# Patient Record
Sex: MALE | Race: WHITE | NOT HISPANIC OR LATINO
[De-identification: names, ages, dates, MRNs, and addresses within clinical notes are randomized per-mention and may not be internally consistent; named-entity substitution may affect disease eponyms.]

---

## 2017-01-25 ENCOUNTER — TRANSCRIPTION ENCOUNTER (OUTPATIENT)
Age: 82
End: 2017-01-25

## 2017-01-30 ENCOUNTER — MEDICATION RENEWAL (OUTPATIENT)
Age: 82
End: 2017-01-30

## 2017-02-06 ENCOUNTER — TRANSCRIPTION ENCOUNTER (OUTPATIENT)
Age: 82
End: 2017-02-06

## 2017-02-10 ENCOUNTER — TRANSCRIPTION ENCOUNTER (OUTPATIENT)
Age: 82
End: 2017-02-10

## 2017-02-13 ENCOUNTER — TRANSCRIPTION ENCOUNTER (OUTPATIENT)
Age: 82
End: 2017-02-13

## 2017-02-28 ENCOUNTER — RX RENEWAL (OUTPATIENT)
Age: 82
End: 2017-02-28

## 2017-03-24 ENCOUNTER — APPOINTMENT (OUTPATIENT)
Dept: HEART AND VASCULAR | Facility: CLINIC | Age: 82
End: 2017-03-24

## 2017-03-24 VITALS
OXYGEN SATURATION: 97 % | TEMPERATURE: 97.6 F | HEART RATE: 66 BPM | WEIGHT: 153 LBS | BODY MASS INDEX: 22.59 KG/M2 | DIASTOLIC BLOOD PRESSURE: 80 MMHG | SYSTOLIC BLOOD PRESSURE: 120 MMHG | RESPIRATION RATE: 12 BRPM

## 2017-03-27 LAB
ALBUMIN SERPL ELPH-MCNC: 4.3 G/DL
ALP BLD-CCNC: 74 U/L
ALT SERPL-CCNC: 23 U/L
ANION GAP SERPL CALC-SCNC: 17 MMOL/L
AST SERPL-CCNC: 28 U/L
BASOPHILS # BLD AUTO: 0.01 K/UL
BASOPHILS NFR BLD AUTO: 0.2 %
BILIRUB SERPL-MCNC: 1 MG/DL
BUN SERPL-MCNC: 19 MG/DL
CALCIUM SERPL-MCNC: 9.5 MG/DL
CHLORIDE SERPL-SCNC: 106 MMOL/L
CHOLEST SERPL-MCNC: 121 MG/DL
CHOLEST/HDLC SERPL: 2.2 RATIO
CO2 SERPL-SCNC: 21 MMOL/L
CREAT SERPL-MCNC: 0.76 MG/DL
EOSINOPHIL # BLD AUTO: 0.13 K/UL
EOSINOPHIL NFR BLD AUTO: 2.1 %
GLUCOSE SERPL-MCNC: 102 MG/DL
HBA1C MFR BLD HPLC: 5.8 %
HCT VFR BLD CALC: 46.3 %
HDLC SERPL-MCNC: 54 MG/DL
HGB BLD-MCNC: 14.8 G/DL
IMM GRANULOCYTES NFR BLD AUTO: 0.2 %
LDLC SERPL CALC-MCNC: 58 MG/DL
LYMPHOCYTES # BLD AUTO: 1.22 K/UL
LYMPHOCYTES NFR BLD AUTO: 20.1 %
MAN DIFF?: NORMAL
MCHC RBC-ENTMCNC: 29.7 PG
MCHC RBC-ENTMCNC: 32 GM/DL
MCV RBC AUTO: 92.8 FL
MONOCYTES # BLD AUTO: 0.55 K/UL
MONOCYTES NFR BLD AUTO: 9.1 %
NEUTROPHILS # BLD AUTO: 4.14 K/UL
NEUTROPHILS NFR BLD AUTO: 68.3 %
PLATELET # BLD AUTO: 208 K/UL
POTASSIUM SERPL-SCNC: 4.4 MMOL/L
PROT SERPL-MCNC: 6.5 G/DL
RBC # BLD: 4.99 M/UL
RBC # FLD: 15.3 %
SODIUM SERPL-SCNC: 144 MMOL/L
TRIGL SERPL-MCNC: 43 MG/DL
WBC # FLD AUTO: 6.06 K/UL

## 2017-04-27 ENCOUNTER — RX RENEWAL (OUTPATIENT)
Age: 82
End: 2017-04-27

## 2017-05-24 ENCOUNTER — APPOINTMENT (OUTPATIENT)
Dept: HEART AND VASCULAR | Facility: CLINIC | Age: 82
End: 2017-05-24

## 2017-05-24 VITALS
OXYGEN SATURATION: 97 % | BODY MASS INDEX: 22.45 KG/M2 | RESPIRATION RATE: 12 BRPM | HEART RATE: 62 BPM | TEMPERATURE: 97.5 F | DIASTOLIC BLOOD PRESSURE: 90 MMHG | SYSTOLIC BLOOD PRESSURE: 150 MMHG | WEIGHT: 152 LBS

## 2017-05-24 DIAGNOSIS — R94.31 ABNORMAL ELECTROCARDIOGRAM [ECG] [EKG]: ICD-10-CM

## 2017-05-24 DIAGNOSIS — R26.81 UNSTEADINESS ON FEET: ICD-10-CM

## 2017-05-25 LAB
25(OH)D3 SERPL-MCNC: 32.7 NG/ML
ALBUMIN SERPL ELPH-MCNC: 4.4 G/DL
ALP BLD-CCNC: 71 U/L
ALT SERPL-CCNC: 19 U/L
ANION GAP SERPL CALC-SCNC: 17 MMOL/L
AST SERPL-CCNC: 26 U/L
BASOPHILS # BLD AUTO: 0.01 K/UL
BASOPHILS NFR BLD AUTO: 0.1 %
BILIRUB SERPL-MCNC: 0.8 MG/DL
BUN SERPL-MCNC: 21 MG/DL
CALCIUM SERPL-MCNC: 9.2 MG/DL
CHLORIDE SERPL-SCNC: 102 MMOL/L
CHOLEST SERPL-MCNC: 139 MG/DL
CHOLEST/HDLC SERPL: 2.6 RATIO
CO2 SERPL-SCNC: 24 MMOL/L
CREAT SERPL-MCNC: 0.65 MG/DL
EOSINOPHIL # BLD AUTO: 0.13 K/UL
EOSINOPHIL NFR BLD AUTO: 1.9 %
GLUCOSE SERPL-MCNC: 98 MG/DL
HBA1C MFR BLD HPLC: 5.8 %
HCT VFR BLD CALC: 46.6 %
HDLC SERPL-MCNC: 54 MG/DL
HGB BLD-MCNC: 14.9 G/DL
IMM GRANULOCYTES NFR BLD AUTO: 0.1 %
LDLC SERPL CALC-MCNC: 74 MG/DL
LYMPHOCYTES # BLD AUTO: 1.75 K/UL
LYMPHOCYTES NFR BLD AUTO: 25.3 %
MAGNESIUM SERPL-MCNC: 2.1 MG/DL
MAN DIFF?: NORMAL
MCHC RBC-ENTMCNC: 29.3 PG
MCHC RBC-ENTMCNC: 32 GM/DL
MCV RBC AUTO: 91.7 FL
MONOCYTES # BLD AUTO: 0.48 K/UL
MONOCYTES NFR BLD AUTO: 6.9 %
NEUTROPHILS # BLD AUTO: 4.53 K/UL
NEUTROPHILS NFR BLD AUTO: 65.7 %
PLATELET # BLD AUTO: 217 K/UL
POTASSIUM SERPL-SCNC: 4.7 MMOL/L
PROT SERPL-MCNC: 6.8 G/DL
RBC # BLD: 5.08 M/UL
RBC # FLD: 14.8 %
SODIUM SERPL-SCNC: 143 MMOL/L
TRIGL SERPL-MCNC: 53 MG/DL
TSH SERPL-ACNC: 3.27 UIU/ML
WBC # FLD AUTO: 6.91 K/UL

## 2017-05-26 LAB
B BURGDOR AB SER-IMP: NEGATIVE
B BURGDOR IGM PATRN SER IB-IMP: NEGATIVE
B BURGDOR18/20KD IGM SER QL IB: NORMAL
B BURGDOR18KD IGG SER QL IB: NORMAL
B BURGDOR23KD IGG SER QL IB: NORMAL
B BURGDOR23KD IGM SER QL IB: NORMAL
B BURGDOR28KD AB SER QL IB: NORMAL
B BURGDOR28KD IGG SER QL IB: NORMAL
B BURGDOR30KD AB SER QL IB: NORMAL
B BURGDOR30KD IGG SER QL IB: NORMAL
B BURGDOR31KD IGG SER QL IB: NORMAL
B BURGDOR31KD IGM SER QL IB: NORMAL
B BURGDOR39KD IGG SER QL IB: NORMAL
B BURGDOR39KD IGM SER QL IB: NORMAL
B BURGDOR41KD IGG SER QL IB: PRESENT
B BURGDOR41KD IGM SER QL IB: NORMAL
B BURGDOR45KD AB SER QL IB: NORMAL
B BURGDOR45KD IGG SER QL IB: NORMAL
B BURGDOR58KD AB SER QL IB: NORMAL
B BURGDOR58KD IGG SER QL IB: PRESENT
B BURGDOR66KD IGG SER QL IB: PRESENT
B BURGDOR66KD IGM SER QL IB: NORMAL
B BURGDOR93KD IGG SER QL IB: NORMAL
B BURGDOR93KD IGM SER QL IB: NORMAL

## 2017-05-30 ENCOUNTER — APPOINTMENT (OUTPATIENT)
Dept: HEART AND VASCULAR | Facility: CLINIC | Age: 82
End: 2017-05-30

## 2017-05-30 VITALS
OXYGEN SATURATION: 97 % | RESPIRATION RATE: 12 BRPM | HEART RATE: 90 BPM | TEMPERATURE: 97.9 F | SYSTOLIC BLOOD PRESSURE: 130 MMHG | DIASTOLIC BLOOD PRESSURE: 88 MMHG

## 2017-05-30 RX ORDER — OFLOXACIN 3 MG/ML
0.3 SOLUTION/ DROPS OPHTHALMIC
Qty: 5 | Refills: 0 | Status: ACTIVE | COMMUNITY
Start: 2017-05-04

## 2017-06-17 ENCOUNTER — APPOINTMENT (OUTPATIENT)
Dept: HEART AND VASCULAR | Facility: CLINIC | Age: 82
End: 2017-06-17

## 2017-06-22 ENCOUNTER — APPOINTMENT (OUTPATIENT)
Dept: HEART AND VASCULAR | Facility: CLINIC | Age: 82
End: 2017-06-22

## 2017-06-22 VITALS
SYSTOLIC BLOOD PRESSURE: 160 MMHG | HEART RATE: 61 BPM | BODY MASS INDEX: 23.19 KG/M2 | HEIGHT: 68 IN | WEIGHT: 153 LBS | DIASTOLIC BLOOD PRESSURE: 78 MMHG

## 2017-07-11 ENCOUNTER — FORM ENCOUNTER (OUTPATIENT)
Age: 82
End: 2017-07-11

## 2017-07-11 ENCOUNTER — OUTPATIENT (OUTPATIENT)
Dept: OUTPATIENT SERVICES | Facility: HOSPITAL | Age: 82
LOS: 1 days | Discharge: HOME | End: 2017-07-11
Payer: MEDICARE

## 2017-07-11 VITALS — WEIGHT: 149.91 LBS | HEIGHT: 69 IN

## 2017-07-11 DIAGNOSIS — E78.5 HYPERLIPIDEMIA, UNSPECIFIED: ICD-10-CM

## 2017-07-11 DIAGNOSIS — I45.2 BIFASCICULAR BLOCK: ICD-10-CM

## 2017-07-11 DIAGNOSIS — I10 ESSENTIAL (PRIMARY) HYPERTENSION: ICD-10-CM

## 2017-07-11 PROCEDURE — 93620 COMP EP EVL R AT VEN PAC&REC: CPT | Mod: 26

## 2017-07-11 RX ORDER — LOSARTAN POTASSIUM 100 MG/1
100 TABLET, FILM COATED ORAL ONCE
Qty: 0 | Refills: 0 | Status: COMPLETED | OUTPATIENT
Start: 2017-07-11 | End: 2017-07-11

## 2017-07-11 RX ORDER — AMLODIPINE BESYLATE 2.5 MG/1
5 TABLET ORAL ONCE
Qty: 0 | Refills: 0 | Status: COMPLETED | OUTPATIENT
Start: 2017-07-11 | End: 2017-07-11

## 2017-07-11 RX ORDER — CEFAZOLIN SODIUM 1 G
1000 VIAL (EA) INJECTION ONCE
Qty: 0 | Refills: 0 | Status: COMPLETED | OUTPATIENT
Start: 2017-07-11 | End: 2017-07-11

## 2017-07-11 RX ORDER — CEFAZOLIN SODIUM 1 G
VIAL (EA) INJECTION
Qty: 0 | Refills: 0 | Status: COMPLETED | OUTPATIENT
Start: 2017-07-11 | End: 2017-07-12

## 2017-07-11 RX ORDER — ATORVASTATIN CALCIUM 80 MG/1
20 TABLET, FILM COATED ORAL ONCE
Qty: 0 | Refills: 0 | Status: COMPLETED | OUTPATIENT
Start: 2017-07-11 | End: 2017-07-11

## 2017-07-11 RX ORDER — ASPIRIN/CALCIUM CARB/MAGNESIUM 324 MG
81 TABLET ORAL ONCE
Qty: 0 | Refills: 0 | Status: COMPLETED | OUTPATIENT
Start: 2017-07-11 | End: 2017-07-11

## 2017-07-11 RX ORDER — LANOLIN ALCOHOL/MO/W.PET/CERES
3 CREAM (GRAM) TOPICAL AT BEDTIME
Qty: 0 | Refills: 0 | Status: DISCONTINUED | OUTPATIENT
Start: 2017-07-11 | End: 2017-07-12

## 2017-07-11 RX ORDER — ACETAMINOPHEN 500 MG
650 TABLET ORAL EVERY 6 HOURS
Qty: 0 | Refills: 0 | Status: DISCONTINUED | OUTPATIENT
Start: 2017-07-11 | End: 2017-07-12

## 2017-07-11 RX ORDER — CEFAZOLIN SODIUM 1 G
1000 VIAL (EA) INJECTION EVERY 8 HOURS
Qty: 0 | Refills: 0 | Status: COMPLETED | OUTPATIENT
Start: 2017-07-11 | End: 2017-07-12

## 2017-07-11 RX ADMIN — Medication 100 MILLIGRAM(S): at 09:42

## 2017-07-11 RX ADMIN — AMLODIPINE BESYLATE 5 MILLIGRAM(S): 2.5 TABLET ORAL at 18:39

## 2017-07-11 RX ADMIN — Medication 81 MILLIGRAM(S): at 18:39

## 2017-07-11 RX ADMIN — ATORVASTATIN CALCIUM 20 MILLIGRAM(S): 80 TABLET, FILM COATED ORAL at 21:59

## 2017-07-11 RX ADMIN — LOSARTAN POTASSIUM 100 MILLIGRAM(S): 100 TABLET, FILM COATED ORAL at 18:39

## 2017-07-11 RX ADMIN — Medication 100 MILLIGRAM(S): at 18:40

## 2017-07-11 NOTE — H&P ADULT - PMH
Essential hypertension    HLD (hyperlipidemia)    RBBB (right bundle branch block with left anterior fascicular block)

## 2017-07-11 NOTE — H&P ADULT - HISTORY OF PRESENT ILLNESS
89 yo m with history of HTN, HLD, RBBB/LAFB presents for EP study and possible pacemaker implant.  Patient has had balance problems for the past 2 years, after slipping episode. He denies chest pain, palpitations, dizziness, syncope.  Patient has an out patient cardiac monitor and it showed RBBB.  PAtient was evaluated in the office and advised to have EPS to evaluate for possible PPM.

## 2017-07-12 ENCOUNTER — TRANSCRIPTION ENCOUNTER (OUTPATIENT)
Age: 82
End: 2017-07-12

## 2017-07-12 VITALS — TEMPERATURE: 98 F

## 2017-07-12 PROCEDURE — C1894: CPT

## 2017-07-12 PROCEDURE — 93623 PRGRMD STIMJ&PACG IV RX NFS: CPT

## 2017-07-12 PROCEDURE — C1892: CPT

## 2017-07-12 PROCEDURE — 71046 X-RAY EXAM CHEST 2 VIEWS: CPT

## 2017-07-12 PROCEDURE — C1730: CPT

## 2017-07-12 PROCEDURE — 71020: CPT | Mod: 26

## 2017-07-12 PROCEDURE — 93620 COMP EP EVL R AT VEN PAC&REC: CPT

## 2017-07-12 PROCEDURE — C1785: CPT

## 2017-07-12 PROCEDURE — C1769: CPT

## 2017-07-12 PROCEDURE — C1898: CPT

## 2017-07-12 RX ADMIN — Medication 100 MILLIGRAM(S): at 01:09

## 2017-07-12 RX ADMIN — Medication 3 MILLIGRAM(S): at 01:18

## 2017-07-12 RX ADMIN — Medication 100 MILLIGRAM(S): at 11:02

## 2017-07-12 NOTE — DISCHARGE NOTE ADULT - CARE PROVIDER_API CALL
Mauro Gunter), Cardiac Electrophysiology; Cardiology; Cardiovascular Disease  [address] [place] [address]  Phone: [phone]  Fax: [phone]

## 2017-07-12 NOTE — DISCHARGE NOTE ADULT - MEDICATION SUMMARY - MEDICATIONS TO TAKE
I will START or STAY ON the medications listed below when I get home from the hospital:    aspirin 81 mg oral tablet  -- 1 tab(s) by mouth once a day  -- Indication: For Prevention    losartan 100 mg oral tablet  -- 1 tab(s) by mouth once a day  -- Indication: For Hypertension    atorvastatin 20 mg oral tablet  -- 1 tab(s) by mouth once a day  -- Indication: For Hyperlipidemia    amLODIPine 5 mg oral tablet  -- 1 tab(s) by mouth once a day  -- Indication: For Hypertension    D3 1000 oral tablet  -- 1 tab(s) by mouth once a day  -- Indication: For Supplement

## 2017-07-12 NOTE — DISCHARGE NOTE ADULT - ADDITIONAL INSTRUCTIONS
1.  Follow up with Dr. Porras on  [address] [place] [address], [address]  [phone] 1.  Follow up with Dr. Porras on 7/26 (wednesday) at 11:20 AM.   [address], [address]  [phone]

## 2017-07-12 NOTE — DISCHARGE NOTE ADULT - HOSPITAL COURSE
89 yo m with history of HTN, HLD, RBBB/LAFB presents for EP study and possible pacemaker implant.Patient has had balance problems for the past 2 years, after slipping episode. He denies chest pain, palpitations, dizziness, syncope.Patient has an out patient cardiac monitor and it showed RBBB.PAtient was evaluated in the office and advised to have EPS to evaluate for possible PPM.    EPS was positive and patient has a successful PPM placement, was monitored overnight on tele. Xray showed correct lead placement, no pneumothorax. Patient was discharged home with follow up appointment.

## 2017-07-12 NOTE — DISCHARGE NOTE ADULT - PLAN OF CARE
You underwent a successful pacemaker placement on 7/11/17. To prevent lead dislodgement, please avoid lifting your left arm above shoulder level or lifting anything more than 10 pounds using the left for one month. If you have fever >101, chills, swelling or discharge from the surgical site, pelase notify Dr. Porras. Prevent syncope You underwent a successful pacemaker placement on 7/11/17. To prevent lead dislodgement, please avoid lifting your left arm above shoulder level or lifting anything more than 10 pounds using the left for one month. If you have fever >101, chills, swelling or discharge from the surgical site, please notify Dr. Porras. You can shower on Friday, please sponge bath until then. Please avoid walking on the treadmill until Monday.

## 2017-07-12 NOTE — DISCHARGE NOTE ADULT - CARE PLAN
Principal Discharge DX:	Bifascicular block  Instructions for follow-up, activity and diet:	You underwent a successful pacemaker placement on 7/11/17. To prevent lead dislodgement, please avoid lifting your left arm above shoulder level or lifting anything more than 10 pounds using the left for one month. If you have fever >101, chills, swelling or discharge from the surgical site, pelase notify Dr. Porras. Principal Discharge DX:	Bifascicular block  Goal:	Prevent syncope  Instructions for follow-up, activity and diet:	You underwent a successful pacemaker placement on 7/11/17. To prevent lead dislodgement, please avoid lifting your left arm above shoulder level or lifting anything more than 10 pounds using the left for one month. If you have fever >101, chills, swelling or discharge from the surgical site, pelase notify Dr. Porras. Principal Discharge DX:	Bifascicular block  Goal:	Prevent syncope  Instructions for follow-up, activity and diet:	You underwent a successful pacemaker placement on 7/11/17. To prevent lead dislodgement, please avoid lifting your left arm above shoulder level or lifting anything more than 10 pounds using the left for one month. If you have fever >101, chills, swelling or discharge from the surgical site, please notify Dr. Porras. You can shower on Friday, please sponge bath until then. Please avoid walking on the treadmill until Monday.

## 2017-07-12 NOTE — DISCHARGE NOTE ADULT - PATIENT PORTAL LINK FT
“You can access the [facility] Patient Portal, offered by Health system, by registering with the following website: [email or web address]”

## 2017-07-20 DIAGNOSIS — I44.0 ATRIOVENTRICULAR BLOCK, FIRST DEGREE: ICD-10-CM

## 2017-07-26 ENCOUNTER — APPOINTMENT (OUTPATIENT)
Dept: HEART AND VASCULAR | Facility: CLINIC | Age: 82
End: 2017-07-26

## 2017-07-26 VITALS
BODY MASS INDEX: 23.04 KG/M2 | HEART RATE: 75 BPM | WEIGHT: 152 LBS | HEIGHT: 68 IN | DIASTOLIC BLOOD PRESSURE: 75 MMHG | SYSTOLIC BLOOD PRESSURE: 148 MMHG

## 2017-08-15 ENCOUNTER — RX RENEWAL (OUTPATIENT)
Age: 82
End: 2017-08-15

## 2017-10-11 ENCOUNTER — APPOINTMENT (OUTPATIENT)
Dept: HEART AND VASCULAR | Facility: CLINIC | Age: 82
End: 2017-10-11
Payer: MEDICARE

## 2017-10-11 VITALS
DIASTOLIC BLOOD PRESSURE: 68 MMHG | HEIGHT: 68 IN | WEIGHT: 155 LBS | HEART RATE: 81 BPM | BODY MASS INDEX: 23.49 KG/M2 | SYSTOLIC BLOOD PRESSURE: 110 MMHG

## 2017-10-11 PROCEDURE — 93280 PM DEVICE PROGR EVAL DUAL: CPT

## 2017-11-10 ENCOUNTER — RX RENEWAL (OUTPATIENT)
Age: 82
End: 2017-11-10

## 2017-11-14 ENCOUNTER — APPOINTMENT (OUTPATIENT)
Dept: HEART AND VASCULAR | Facility: CLINIC | Age: 82
End: 2017-11-14
Payer: MEDICARE

## 2017-11-14 VITALS
TEMPERATURE: 98.1 F | DIASTOLIC BLOOD PRESSURE: 84 MMHG | HEART RATE: 67 BPM | HEIGHT: 68 IN | WEIGHT: 156 LBS | BODY MASS INDEX: 23.64 KG/M2 | OXYGEN SATURATION: 96 % | SYSTOLIC BLOOD PRESSURE: 142 MMHG | RESPIRATION RATE: 12 BRPM

## 2017-11-14 VITALS — SYSTOLIC BLOOD PRESSURE: 138 MMHG | DIASTOLIC BLOOD PRESSURE: 80 MMHG

## 2017-11-14 DIAGNOSIS — R19.00 INTRA-ABDOMINAL AND PELVIC SWELLING, MASS AND LUMP, UNSPECIFIED SITE: ICD-10-CM

## 2017-11-14 PROCEDURE — 99214 OFFICE O/P EST MOD 30 MIN: CPT | Mod: 25

## 2017-11-14 PROCEDURE — 36415 COLL VENOUS BLD VENIPUNCTURE: CPT

## 2017-11-14 PROCEDURE — 93000 ELECTROCARDIOGRAM COMPLETE: CPT

## 2017-11-14 RX ORDER — OXYCODONE AND ACETAMINOPHEN 5; 325 MG/1; MG/1
5-325 TABLET ORAL
Qty: 15 | Refills: 0 | Status: DISCONTINUED | COMMUNITY
Start: 2017-05-30 | End: 2017-11-14

## 2017-11-15 LAB
25(OH)D3 SERPL-MCNC: 34 NG/ML
APTT BLD: 37.1 SEC
BASOPHILS # BLD AUTO: 0.01 K/UL
BASOPHILS NFR BLD AUTO: 0.1 %
EOSINOPHIL # BLD AUTO: 0.24 K/UL
EOSINOPHIL NFR BLD AUTO: 3.1 %
FOLATE SERPL-MCNC: >20 NG/ML
HBA1C MFR BLD HPLC: 5.6 %
HCT VFR BLD CALC: 44 %
HGB BLD-MCNC: 14.3 G/DL
IMM GRANULOCYTES NFR BLD AUTO: 0.1 %
INR PPP: 0.97 RATIO
LYMPHOCYTES # BLD AUTO: 1.89 K/UL
LYMPHOCYTES NFR BLD AUTO: 24.6 %
MAN DIFF?: NORMAL
MCHC RBC-ENTMCNC: 30 PG
MCHC RBC-ENTMCNC: 32.5 GM/DL
MCV RBC AUTO: 92.2 FL
MONOCYTES # BLD AUTO: 0.75 K/UL
MONOCYTES NFR BLD AUTO: 9.8 %
NEUTROPHILS # BLD AUTO: 4.77 K/UL
NEUTROPHILS NFR BLD AUTO: 62.3 %
PLATELET # BLD AUTO: 218 K/UL
PT BLD: 11 SEC
RBC # BLD: 4.77 M/UL
RBC # FLD: 14.8 %
VIT B12 SERPL-MCNC: 102 PG/ML
WBC # FLD AUTO: 7.67 K/UL

## 2017-11-16 LAB
ALBUMIN SERPL ELPH-MCNC: 4.2 G/DL
ALP BLD-CCNC: 73 U/L
ALT SERPL-CCNC: 22 U/L
ANION GAP SERPL CALC-SCNC: 15 MMOL/L
AST SERPL-CCNC: 27 U/L
BILIRUB SERPL-MCNC: 0.5 MG/DL
BUN SERPL-MCNC: 18 MG/DL
CALCIUM SERPL-MCNC: 9.7 MG/DL
CHLORIDE SERPL-SCNC: 106 MMOL/L
CHOLEST SERPL-MCNC: 126 MG/DL
CHOLEST/HDLC SERPL: 2.5 RATIO
CO2 SERPL-SCNC: 24 MMOL/L
CREAT SERPL-MCNC: 0.78 MG/DL
GLUCOSE SERPL-MCNC: 104 MG/DL
HDLC SERPL-MCNC: 51 MG/DL
LDLC SERPL CALC-MCNC: 53 MG/DL
POTASSIUM SERPL-SCNC: 5.1 MMOL/L
PROT SERPL-MCNC: 6.6 G/DL
PSA SERPL-MCNC: 12.14 NG/ML
SODIUM SERPL-SCNC: 145 MMOL/L
TRIGL SERPL-MCNC: 108 MG/DL
TSH SERPL-ACNC: 2.84 UIU/ML

## 2017-11-20 ENCOUNTER — OUTPATIENT (OUTPATIENT)
Dept: OUTPATIENT SERVICES | Facility: HOSPITAL | Age: 82
LOS: 1 days | End: 2017-11-20
Payer: MEDICARE

## 2017-11-20 PROCEDURE — 74176 CT ABD & PELVIS W/O CONTRAST: CPT

## 2017-11-20 PROCEDURE — 76705 ECHO EXAM OF ABDOMEN: CPT

## 2017-11-20 PROCEDURE — 74176 CT ABD & PELVIS W/O CONTRAST: CPT | Mod: 26

## 2017-11-20 PROCEDURE — 76705 ECHO EXAM OF ABDOMEN: CPT | Mod: 26

## 2017-12-07 ENCOUNTER — APPOINTMENT (OUTPATIENT)
Dept: SURGERY | Facility: CLINIC | Age: 82
End: 2017-12-07
Payer: MEDICARE

## 2017-12-07 VITALS
HEIGHT: 66.5 IN | BODY MASS INDEX: 23.74 KG/M2 | OXYGEN SATURATION: 98 % | WEIGHT: 149.5 LBS | SYSTOLIC BLOOD PRESSURE: 160 MMHG | DIASTOLIC BLOOD PRESSURE: 84 MMHG | HEART RATE: 67 BPM | TEMPERATURE: 97.3 F

## 2017-12-07 DIAGNOSIS — Z80.3 FAMILY HISTORY OF MALIGNANT NEOPLASM OF BREAST: ICD-10-CM

## 2017-12-07 DIAGNOSIS — H40.9 UNSPECIFIED GLAUCOMA: ICD-10-CM

## 2017-12-07 DIAGNOSIS — Z87.19 PERSONAL HISTORY OF OTHER DISEASES OF THE DIGESTIVE SYSTEM: ICD-10-CM

## 2017-12-07 DIAGNOSIS — Z87.891 PERSONAL HISTORY OF NICOTINE DEPENDENCE: ICD-10-CM

## 2017-12-07 PROCEDURE — 99204 OFFICE O/P NEW MOD 45 MIN: CPT

## 2018-01-26 ENCOUNTER — RX RENEWAL (OUTPATIENT)
Age: 83
End: 2018-01-26

## 2018-03-14 ENCOUNTER — APPOINTMENT (OUTPATIENT)
Dept: HEART AND VASCULAR | Facility: CLINIC | Age: 83
End: 2018-03-14
Payer: MEDICARE

## 2018-03-14 VITALS
BODY MASS INDEX: 24.62 KG/M2 | HEART RATE: 62 BPM | HEIGHT: 66.5 IN | SYSTOLIC BLOOD PRESSURE: 149 MMHG | WEIGHT: 155 LBS | DIASTOLIC BLOOD PRESSURE: 78 MMHG

## 2018-03-14 PROCEDURE — 93280 PM DEVICE PROGR EVAL DUAL: CPT

## 2018-04-03 ENCOUNTER — APPOINTMENT (OUTPATIENT)
Dept: HEART AND VASCULAR | Facility: CLINIC | Age: 83
End: 2018-04-03
Payer: MEDICARE

## 2018-04-03 VITALS
HEIGHT: 66.5 IN | DIASTOLIC BLOOD PRESSURE: 90 MMHG | OXYGEN SATURATION: 97 % | BODY MASS INDEX: 24.62 KG/M2 | WEIGHT: 155 LBS | RESPIRATION RATE: 12 BRPM | HEART RATE: 73 BPM | SYSTOLIC BLOOD PRESSURE: 126 MMHG | TEMPERATURE: 97.9 F

## 2018-04-03 PROCEDURE — 99214 OFFICE O/P EST MOD 30 MIN: CPT | Mod: 25

## 2018-04-03 PROCEDURE — 36415 COLL VENOUS BLD VENIPUNCTURE: CPT

## 2018-04-04 LAB
ALBUMIN SERPL ELPH-MCNC: 4.4 G/DL
ALP BLD-CCNC: 75 U/L
ALT SERPL-CCNC: 22 U/L
ANION GAP SERPL CALC-SCNC: 13 MMOL/L
AST SERPL-CCNC: 25 U/L
BASOPHILS # BLD AUTO: 0.01 K/UL
BASOPHILS NFR BLD AUTO: 0.1 %
BILIRUB SERPL-MCNC: 0.4 MG/DL
BUN SERPL-MCNC: 29 MG/DL
CALCIUM SERPL-MCNC: 9.5 MG/DL
CHLORIDE SERPL-SCNC: 109 MMOL/L
CO2 SERPL-SCNC: 24 MMOL/L
CREAT SERPL-MCNC: 0.81 MG/DL
EOSINOPHIL # BLD AUTO: 0.21 K/UL
EOSINOPHIL NFR BLD AUTO: 2.7 %
FOLATE SERPL-MCNC: >20 NG/ML
GLUCOSE SERPL-MCNC: 117 MG/DL
HCT VFR BLD CALC: 43.8 %
HCYS SERPL-MCNC: 15.3 UMOL/L
HGB BLD-MCNC: 14.3 G/DL
IMM GRANULOCYTES NFR BLD AUTO: 0.1 %
LYMPHOCYTES # BLD AUTO: 1.8 K/UL
LYMPHOCYTES NFR BLD AUTO: 23.4 %
MAN DIFF?: NORMAL
MCHC RBC-ENTMCNC: 29.7 PG
MCHC RBC-ENTMCNC: 32.6 GM/DL
MCV RBC AUTO: 90.9 FL
MONOCYTES # BLD AUTO: 0.65 K/UL
MONOCYTES NFR BLD AUTO: 8.4 %
NEUTROPHILS # BLD AUTO: 5.02 K/UL
NEUTROPHILS NFR BLD AUTO: 65.3 %
PLATELET # BLD AUTO: 214 K/UL
POTASSIUM SERPL-SCNC: 4.3 MMOL/L
PROT SERPL-MCNC: 6.6 G/DL
PSA SERPL-MCNC: 12.41 NG/ML
RBC # BLD: 4.82 M/UL
RBC # FLD: 14.5 %
SODIUM SERPL-SCNC: 146 MMOL/L
TSH SERPL-ACNC: 3.33 UIU/ML
VIT B12 SERPL-MCNC: 197 PG/ML
WBC # FLD AUTO: 7.7 K/UL

## 2018-04-07 LAB — METHYLMALONATE SERPL-SCNC: 412 NMOL/L

## 2018-04-17 ENCOUNTER — APPOINTMENT (OUTPATIENT)
Dept: HEART AND VASCULAR | Facility: CLINIC | Age: 83
End: 2018-04-17
Payer: MEDICARE

## 2018-04-17 VITALS
DIASTOLIC BLOOD PRESSURE: 72 MMHG | RESPIRATION RATE: 12 BRPM | WEIGHT: 155 LBS | SYSTOLIC BLOOD PRESSURE: 132 MMHG | OXYGEN SATURATION: 96 % | TEMPERATURE: 97.9 F | BODY MASS INDEX: 24.62 KG/M2 | HEIGHT: 66.5 IN | HEART RATE: 66 BPM

## 2018-04-17 PROCEDURE — 99215 OFFICE O/P EST HI 40 MIN: CPT

## 2018-04-24 ENCOUNTER — APPOINTMENT (OUTPATIENT)
Dept: HEART AND VASCULAR | Facility: CLINIC | Age: 83
End: 2018-04-24
Payer: MEDICARE

## 2018-04-24 VITALS
DIASTOLIC BLOOD PRESSURE: 80 MMHG | BODY MASS INDEX: 24.78 KG/M2 | RESPIRATION RATE: 12 BRPM | HEART RATE: 60 BPM | OXYGEN SATURATION: 97 % | SYSTOLIC BLOOD PRESSURE: 130 MMHG | HEIGHT: 66.5 IN | TEMPERATURE: 97.9 F | WEIGHT: 156 LBS

## 2018-04-24 PROCEDURE — 99214 OFFICE O/P EST MOD 30 MIN: CPT | Mod: 25

## 2018-04-24 PROCEDURE — 96372 THER/PROPH/DIAG INJ SC/IM: CPT

## 2018-04-27 ENCOUNTER — RX RENEWAL (OUTPATIENT)
Age: 83
End: 2018-04-27

## 2018-05-01 ENCOUNTER — APPOINTMENT (OUTPATIENT)
Dept: HEART AND VASCULAR | Facility: CLINIC | Age: 83
End: 2018-05-01
Payer: MEDICARE

## 2018-05-01 VITALS
TEMPERATURE: 98.5 F | DIASTOLIC BLOOD PRESSURE: 74 MMHG | HEIGHT: 66.5 IN | RESPIRATION RATE: 12 BRPM | HEART RATE: 69 BPM | BODY MASS INDEX: 23.98 KG/M2 | SYSTOLIC BLOOD PRESSURE: 122 MMHG | WEIGHT: 151 LBS | OXYGEN SATURATION: 96 %

## 2018-05-01 PROCEDURE — 99214 OFFICE O/P EST MOD 30 MIN: CPT

## 2018-05-02 LAB
FOLATE SERPL-MCNC: >20 NG/ML
VIT B12 SERPL-MCNC: 393 PG/ML

## 2018-06-03 ENCOUNTER — MOBILE ON CALL (OUTPATIENT)
Age: 83
End: 2018-06-03

## 2018-06-11 ENCOUNTER — APPOINTMENT (OUTPATIENT)
Dept: HEMATOLOGY ONCOLOGY | Facility: CLINIC | Age: 83
End: 2018-06-11
Payer: MEDICARE

## 2018-06-11 VITALS
TEMPERATURE: 98 F | RESPIRATION RATE: 12 BRPM | DIASTOLIC BLOOD PRESSURE: 85 MMHG | BODY MASS INDEX: 22.07 KG/M2 | SYSTOLIC BLOOD PRESSURE: 159 MMHG | WEIGHT: 149 LBS | HEIGHT: 69 IN | OXYGEN SATURATION: 98 % | HEART RATE: 68 BPM

## 2018-06-11 DIAGNOSIS — E53.8 DEFICIENCY OF OTHER SPECIFIED B GROUP VITAMINS: ICD-10-CM

## 2018-06-11 PROCEDURE — 96372 THER/PROPH/DIAG INJ SC/IM: CPT

## 2018-06-11 PROCEDURE — 99204 OFFICE O/P NEW MOD 45 MIN: CPT | Mod: 25

## 2018-06-11 RX ORDER — CYANOCOBALAMIN 1000 UG/ML
1000 INJECTION INTRAMUSCULAR; SUBCUTANEOUS
Qty: 0 | Refills: 0 | Status: COMPLETED | OUTPATIENT
Start: 2018-06-11

## 2018-06-11 RX ADMIN — CYANOCOBALAMIN 0 MCG/ML: 1000 INJECTION INTRAMUSCULAR; SUBCUTANEOUS at 00:00

## 2018-06-27 ENCOUNTER — APPOINTMENT (OUTPATIENT)
Dept: UROLOGY | Facility: CLINIC | Age: 83
End: 2018-06-27
Payer: MEDICARE

## 2018-06-27 VITALS — TEMPERATURE: 97.7 F | HEART RATE: 91 BPM | DIASTOLIC BLOOD PRESSURE: 73 MMHG | SYSTOLIC BLOOD PRESSURE: 123 MMHG

## 2018-06-27 DIAGNOSIS — R41.3 OTHER AMNESIA: ICD-10-CM

## 2018-06-27 PROCEDURE — 99204 OFFICE O/P NEW MOD 45 MIN: CPT

## 2018-06-29 LAB
PSA SERPL-MCNC: 2.75 NG/ML
TESTOST SERPL-MCNC: <2.5 NG/DL

## 2018-07-18 ENCOUNTER — APPOINTMENT (OUTPATIENT)
Dept: UROLOGY | Facility: CLINIC | Age: 83
End: 2018-07-18
Payer: MEDICARE

## 2018-07-18 VITALS — SYSTOLIC BLOOD PRESSURE: 120 MMHG | TEMPERATURE: 99.2 F | HEART RATE: 59 BPM | DIASTOLIC BLOOD PRESSURE: 71 MMHG

## 2018-07-18 PROBLEM — I45.2 BIFASCICULAR BLOCK: Chronic | Status: ACTIVE | Noted: 2017-07-11

## 2018-07-18 PROBLEM — I10 ESSENTIAL (PRIMARY) HYPERTENSION: Chronic | Status: ACTIVE | Noted: 2017-07-11

## 2018-07-18 PROBLEM — E78.5 HYPERLIPIDEMIA, UNSPECIFIED: Chronic | Status: ACTIVE | Noted: 2017-07-11

## 2018-07-18 PROCEDURE — 99213 OFFICE O/P EST LOW 20 MIN: CPT

## 2018-09-11 ENCOUNTER — APPOINTMENT (OUTPATIENT)
Dept: HEART AND VASCULAR | Facility: CLINIC | Age: 83
End: 2018-09-11
Payer: MEDICARE

## 2018-09-11 VITALS
WEIGHT: 158.01 LBS | RESPIRATION RATE: 12 BRPM | DIASTOLIC BLOOD PRESSURE: 78 MMHG | BODY MASS INDEX: 23.4 KG/M2 | HEART RATE: 73 BPM | HEIGHT: 69 IN | SYSTOLIC BLOOD PRESSURE: 144 MMHG | TEMPERATURE: 97.9 F | OXYGEN SATURATION: 96 %

## 2018-09-11 DIAGNOSIS — Z78.9 OTHER SPECIFIED HEALTH STATUS: ICD-10-CM

## 2018-09-11 DIAGNOSIS — R19.09 OTHER INTRA-ABDOMINAL AND PELVIC SWELLING, MASS AND LUMP: ICD-10-CM

## 2018-09-11 DIAGNOSIS — Z87.39 PERSONAL HISTORY OF OTHER DISEASES OF THE MUSCULOSKELETAL SYSTEM AND CONNECTIVE TISSUE: ICD-10-CM

## 2018-09-11 DIAGNOSIS — Z86.19 PERSONAL HISTORY OF OTHER INFECTIOUS AND PARASITIC DISEASES: ICD-10-CM

## 2018-09-11 DIAGNOSIS — I65.23 OCCLUSION AND STENOSIS OF BILATERAL CAROTID ARTERIES: ICD-10-CM

## 2018-09-11 DIAGNOSIS — I51.7 CARDIOMEGALY: ICD-10-CM

## 2018-09-11 DIAGNOSIS — R73.09 OTHER ABNORMAL GLUCOSE: ICD-10-CM

## 2018-09-11 DIAGNOSIS — Z01.818 ENCOUNTER FOR OTHER PREPROCEDURAL EXAMINATION: ICD-10-CM

## 2018-09-11 DIAGNOSIS — I34.0 NONRHEUMATIC MITRAL (VALVE) INSUFFICIENCY: ICD-10-CM

## 2018-09-11 PROCEDURE — 99214 OFFICE O/P EST MOD 30 MIN: CPT

## 2018-09-11 PROCEDURE — 93000 ELECTROCARDIOGRAM COMPLETE: CPT

## 2018-09-11 PROCEDURE — 93306 TTE W/DOPPLER COMPLETE: CPT

## 2018-09-11 PROCEDURE — 36415 COLL VENOUS BLD VENIPUNCTURE: CPT

## 2018-09-11 PROCEDURE — 93880 EXTRACRANIAL BILAT STUDY: CPT

## 2018-09-11 RX ORDER — ATORVASTATIN CALCIUM 20 MG/1
20 TABLET, FILM COATED ORAL DAILY
Qty: 90 | Refills: 1 | Status: ACTIVE | COMMUNITY
Start: 2018-09-11 | End: 1900-01-01

## 2018-09-11 RX ORDER — ZOSTER VACCINE RECOMBINANT, ADJUVANTED 50 MCG/0.5
50 KIT INTRAMUSCULAR
Qty: 1 | Refills: 0 | Status: DISCONTINUED | COMMUNITY
Start: 2017-12-05 | End: 2018-09-11

## 2018-09-11 RX ORDER — AMLODIPINE BESYLATE 5 MG/1
5 TABLET ORAL DAILY
Qty: 90 | Refills: 1 | Status: ACTIVE | COMMUNITY
Start: 2018-09-11 | End: 1900-01-01

## 2018-09-11 RX ORDER — VALACYCLOVIR 1 G/1
1 TABLET, FILM COATED ORAL EVERY 8 HOURS
Qty: 21 | Refills: 0 | Status: DISCONTINUED | COMMUNITY
Start: 2017-05-30 | End: 2018-09-11

## 2018-09-12 ENCOUNTER — APPOINTMENT (OUTPATIENT)
Dept: HEART AND VASCULAR | Facility: CLINIC | Age: 83
End: 2018-09-12

## 2018-09-12 LAB
ALBUMIN SERPL ELPH-MCNC: 4.2 G/DL
ALP BLD-CCNC: 70 U/L
ALT SERPL-CCNC: 33 U/L
ANION GAP SERPL CALC-SCNC: 11 MMOL/L
AST SERPL-CCNC: 33 U/L
BASOPHILS # BLD AUTO: 0.01 K/UL
BASOPHILS NFR BLD AUTO: 0.1 %
BILIRUB SERPL-MCNC: 0.3 MG/DL
BUN SERPL-MCNC: 25 MG/DL
CALCIUM SERPL-MCNC: 9.6 MG/DL
CHLORIDE SERPL-SCNC: 106 MMOL/L
CO2 SERPL-SCNC: 27 MMOL/L
CREAT SERPL-MCNC: 0.67 MG/DL
EOSINOPHIL # BLD AUTO: 0.16 K/UL
EOSINOPHIL NFR BLD AUTO: 2 %
GLUCOSE SERPL-MCNC: 108 MG/DL
HCT VFR BLD CALC: 41.1 %
HGB BLD-MCNC: 13.2 G/DL
IMM GRANULOCYTES NFR BLD AUTO: 0.1 %
LYMPHOCYTES # BLD AUTO: 1.72 K/UL
LYMPHOCYTES NFR BLD AUTO: 21.8 %
MAN DIFF?: NORMAL
MCHC RBC-ENTMCNC: 29.6 PG
MCHC RBC-ENTMCNC: 32.1 GM/DL
MCV RBC AUTO: 92.2 FL
MONOCYTES # BLD AUTO: 0.82 K/UL
MONOCYTES NFR BLD AUTO: 10.4 %
NEUTROPHILS # BLD AUTO: 5.16 K/UL
NEUTROPHILS NFR BLD AUTO: 65.6 %
PLATELET # BLD AUTO: 248 K/UL
POTASSIUM SERPL-SCNC: 4.9 MMOL/L
PROT SERPL-MCNC: 6.6 G/DL
RBC # BLD: 4.46 M/UL
RBC # FLD: 15 %
SODIUM SERPL-SCNC: 144 MMOL/L
WBC # FLD AUTO: 7.88 K/UL

## 2018-09-20 ENCOUNTER — APPOINTMENT (OUTPATIENT)
Dept: HEART AND VASCULAR | Facility: CLINIC | Age: 83
End: 2018-09-20
Payer: MEDICARE

## 2018-09-20 VITALS
DIASTOLIC BLOOD PRESSURE: 65 MMHG | HEIGHT: 69 IN | HEART RATE: 65 BPM | WEIGHT: 155 LBS | SYSTOLIC BLOOD PRESSURE: 126 MMHG | BODY MASS INDEX: 22.96 KG/M2

## 2018-09-20 PROCEDURE — 93280 PM DEVICE PROGR EVAL DUAL: CPT

## 2018-09-24 ENCOUNTER — APPOINTMENT (OUTPATIENT)
Dept: UROLOGY | Facility: CLINIC | Age: 83
End: 2018-09-24
Payer: MEDICARE

## 2018-09-24 VITALS — SYSTOLIC BLOOD PRESSURE: 146 MMHG | HEART RATE: 80 BPM | DIASTOLIC BLOOD PRESSURE: 83 MMHG | TEMPERATURE: 97.4 F

## 2018-09-24 PROCEDURE — 99213 OFFICE O/P EST LOW 20 MIN: CPT

## 2018-09-27 LAB — PSA SERPL-MCNC: 0.96 NG/ML

## 2019-01-29 ENCOUNTER — APPOINTMENT (OUTPATIENT)
Dept: HEART AND VASCULAR | Facility: CLINIC | Age: 84
End: 2019-01-29
Payer: MEDICARE

## 2019-01-29 VITALS
BODY MASS INDEX: 24.73 KG/M2 | HEART RATE: 74 BPM | WEIGHT: 167.01 LBS | RESPIRATION RATE: 12 BRPM | OXYGEN SATURATION: 96 % | DIASTOLIC BLOOD PRESSURE: 80 MMHG | TEMPERATURE: 97.6 F | HEIGHT: 69 IN | SYSTOLIC BLOOD PRESSURE: 142 MMHG

## 2019-01-29 DIAGNOSIS — F03.90 UNSPECIFIED DEMENTIA W/OUT BEHAVIORAL DISTURBANCE: ICD-10-CM

## 2019-01-29 DIAGNOSIS — I10 ESSENTIAL (PRIMARY) HYPERTENSION: ICD-10-CM

## 2019-01-29 DIAGNOSIS — E78.5 HYPERLIPIDEMIA, UNSPECIFIED: ICD-10-CM

## 2019-01-29 PROCEDURE — 36415 COLL VENOUS BLD VENIPUNCTURE: CPT

## 2019-01-29 PROCEDURE — 93000 ELECTROCARDIOGRAM COMPLETE: CPT

## 2019-01-29 PROCEDURE — 99214 OFFICE O/P EST MOD 30 MIN: CPT

## 2019-01-29 NOTE — PHYSICAL EXAM
[General Appearance - Well Developed] : well developed [Normal Appearance] : normal appearance [Well Groomed] : well groomed [General Appearance - Well Nourished] : well nourished [No Deformities] : no deformities [General Appearance - In No Acute Distress] : no acute distress [Normal Conjunctiva] : the conjunctiva exhibited no abnormalities [] : no respiratory distress [Respiration, Rhythm And Depth] : normal respiratory rhythm and effort [Exaggerated Use Of Accessory Muscles For Inspiration] : no accessory muscle use [Auscultation Breath Sounds / Voice Sounds] : lungs were clear to auscultation bilaterally [Heart Sounds] : normal S1 and S2 [Abdomen Soft] : soft [Abdomen Tenderness] : non-tender [Skin Turgor] : normal skin turgor [Oriented To Time, Place, And Person] : oriented to person, place, and time [Affect] : the affect was normal [Mood] : the mood was normal [No Anxiety] : not feeling anxious [FreeTextEntry1] : no edema

## 2019-01-30 LAB
25(OH)D3 SERPL-MCNC: 22.6 NG/ML
ALBUMIN SERPL ELPH-MCNC: 4 G/DL
ALP BLD-CCNC: 86 U/L
ALT SERPL-CCNC: 30 U/L
ANION GAP SERPL CALC-SCNC: 13 MMOL/L
AST SERPL-CCNC: 28 U/L
BASOPHILS # BLD AUTO: 0.01 K/UL
BASOPHILS NFR BLD AUTO: 0.1 %
BILIRUB SERPL-MCNC: 0.4 MG/DL
BUN SERPL-MCNC: 28 MG/DL
CALCIUM SERPL-MCNC: 9.5 MG/DL
CHLORIDE SERPL-SCNC: 107 MMOL/L
CHOLEST SERPL-MCNC: 138 MG/DL
CHOLEST/HDLC SERPL: 2.6 RATIO
CO2 SERPL-SCNC: 24 MMOL/L
CREAT SERPL-MCNC: 0.73 MG/DL
EOSINOPHIL # BLD AUTO: 0.17 K/UL
EOSINOPHIL NFR BLD AUTO: 2.1 %
FOLATE SERPL-MCNC: >20 NG/ML
GLUCOSE SERPL-MCNC: 120 MG/DL
HBA1C MFR BLD HPLC: 5.6 %
HCT VFR BLD CALC: 41.1 %
HDLC SERPL-MCNC: 53 MG/DL
HGB BLD-MCNC: 13.5 G/DL
IMM GRANULOCYTES NFR BLD AUTO: 0.1 %
LDLC SERPL CALC-MCNC: 53 MG/DL
LYMPHOCYTES # BLD AUTO: 1.45 K/UL
LYMPHOCYTES NFR BLD AUTO: 18 %
MAN DIFF?: NORMAL
MCHC RBC-ENTMCNC: 29.7 PG
MCHC RBC-ENTMCNC: 32.8 GM/DL
MCV RBC AUTO: 90.3 FL
MONOCYTES # BLD AUTO: 0.83 K/UL
MONOCYTES NFR BLD AUTO: 10.3 %
NEUTROPHILS # BLD AUTO: 5.57 K/UL
NEUTROPHILS NFR BLD AUTO: 69.4 %
PLATELET # BLD AUTO: 243 K/UL
POTASSIUM SERPL-SCNC: 5.2 MMOL/L
PROT SERPL-MCNC: 6.4 G/DL
PSA SERPL-MCNC: 0.41 NG/ML
RBC # BLD: 4.55 M/UL
RBC # FLD: 14.8 %
SODIUM SERPL-SCNC: 144 MMOL/L
TRIGL SERPL-MCNC: 158 MG/DL
TSH SERPL-ACNC: 2.94 UIU/ML
VIT B12 SERPL-MCNC: 531 PG/ML
WBC # FLD AUTO: 8.04 K/UL

## 2019-01-30 RX ORDER — GALANTAMINE 8 MG/1
8 TABLET, FILM COATED ORAL
Qty: 30 | Refills: 0 | Status: ACTIVE | COMMUNITY
Start: 2019-01-30

## 2019-02-04 ENCOUNTER — APPOINTMENT (OUTPATIENT)
Dept: UROLOGY | Facility: CLINIC | Age: 84
End: 2019-02-04
Payer: MEDICARE

## 2019-02-04 VITALS — DIASTOLIC BLOOD PRESSURE: 79 MMHG | TEMPERATURE: 98.4 F | SYSTOLIC BLOOD PRESSURE: 125 MMHG | HEART RATE: 80 BPM

## 2019-02-04 DIAGNOSIS — C61 MALIGNANT NEOPLASM OF PROSTATE: ICD-10-CM

## 2019-02-04 PROCEDURE — 99213 OFFICE O/P EST LOW 20 MIN: CPT

## 2019-02-04 NOTE — PHYSICAL EXAM
[General Appearance - In No Acute Distress] : no acute distress [FreeTextEntry1] : Elderly, appears stated age [Abdomen Soft] : soft [Abdomen Tenderness] : non-tender [Costovertebral Angle Tenderness] : no ~M costovertebral angle tenderness [Oriented To Time, Place, And Person] : oriented to person, place, and time [Affect] : the affect was normal [Mood] : the mood was normal [Not Anxious] : not anxious [Normal Station and Gait] : the gait and station were normal for the patient's age [No Focal Deficits] : no focal deficits [No Palpable Adenopathy] : no palpable adenopathy

## 2019-02-04 NOTE — ASSESSMENT
[FreeTextEntry1] : 89yo male with reported history of prostate cancer now on primary ADT\par Unknown PSA at diagnosis, prostate cancer grade or stage or date of diagnosis\par Considering his advanced age and functional status, the primary goal of treatment would be to prevent consequences of metastatic prostate cancer\par PSA now = 0.04 after Lupron given by outside urologist\par Continue observation with intermittent ADT as needed\par F/u 3 months

## 2019-02-04 NOTE — HISTORY OF PRESENT ILLNESS
[FreeTextEntry1] : This is an 88yo male referred by Dr. Carlisle for second opinion regarding prostate cancer. He is a poor historian and his friend does not know much history either, so records are mostly from his chart and discussion with Dr. Carlisle. He was reportedly diagnosed around 4 years ago but PSA, grade and stage are unknown. He was on watchful waiting but PSA has recently risen to 15 from 12 and he was started on Lupron. No imaging is available and he does not recall having any imaging tests. He denies any pain or difficulty voiding. He does have memory problems and balance problems. \par \par 7/18/18 Here for f/u. PSA = 2.75, Testosterone <2.5 in June. Imaging was not approved by insurance. No current complaints.\par \par 9/24/18 Here for f/u. Scheduled for eye surgery tomorrow. He thinks he got a Lupron injection in the last couple of weeks by another urologist but he is not sure. He cannot remember the name of the urologist. \par \par 2/04/19 Here for f/u. States he has received at least 2 Lupron injections by Dr. Larsen. PSA last week = 0.04. Feeling well, no complaints.  [None] : None

## 2019-03-14 ENCOUNTER — APPOINTMENT (OUTPATIENT)
Dept: HEART AND VASCULAR | Facility: CLINIC | Age: 84
End: 2019-03-14
Payer: MEDICARE

## 2019-03-14 ENCOUNTER — TRANSCRIPTION ENCOUNTER (OUTPATIENT)
Age: 84
End: 2019-03-14

## 2019-03-14 VITALS
HEART RATE: 68 BPM | HEIGHT: 69 IN | SYSTOLIC BLOOD PRESSURE: 150 MMHG | WEIGHT: 170 LBS | DIASTOLIC BLOOD PRESSURE: 72 MMHG | BODY MASS INDEX: 25.18 KG/M2

## 2019-03-14 PROCEDURE — 93280 PM DEVICE PROGR EVAL DUAL: CPT

## 2019-03-19 NOTE — PHYSICAL EXAM
[General Appearance - Well Developed] : well developed [Normal Appearance] : normal appearance [Well Groomed] : well groomed [General Appearance - Well Nourished] : well nourished [No Deformities] : no deformities [General Appearance - In No Acute Distress] : no acute distress [Heart Rate And Rhythm] : heart rate and rhythm were normal [Heart Sounds] : normal S1 and S2 [Edema] : no peripheral edema present [Respiration, Rhythm And Depth] : normal respiratory rhythm and effort [Exaggerated Use Of Accessory Muscles For Inspiration] : no accessory muscle use [Left Infraclavicular] : left infraclavicular area [Clean] : clean [Dry] : dry [Well-Healed] : well-healed [] : no ischemic changes [Normal Conjunctiva] : the conjunctiva exhibited no abnormalities [Eyelids - No Xanthelasma] : the eyelids demonstrated no xanthelasmas [Normal Jugular Venous V Waves Present] : normal jugular venous V waves present [Oriented To Time, Place, And Person] : oriented to person, place, and time [Impaired Insight] : insight and judgment were intact [Affect] : the affect was normal [Mood] : the mood was normal [No Anxiety] : not feeling anxious [Palpable Crepitus] : no palpable crepitus [Bleeding] : no active bleeding [Foul Odor] : no foul smell [Purulent Drainage] : no purulent drainage [Serosanguineous Drainage] : no serosanquineous drainage [Serous Drainage] : no serous drainage [Erythema] : not erythematous [Warm] : not warm [Tender] : not tender [Indurated] : not indurated [Fluctuant] : not fluctuant

## 2019-03-19 NOTE — PROCEDURE
[No] : not [NSR] : normal sinus rhythm [Pacemaker] : pacemaker [DDD] : DDD [Voltage: ___ volts] : Voltage was [unfilled] volts [Threshold Testing Performed] : Threshold testing was performed [Lead Imp:  ___ohms] : lead impedance was [unfilled] ohms [Sensing Amplitude ___mv] : sensing amplitude was [unfilled] mv [___V @] : [unfilled] V [___ ms] : [unfilled] ms [None] : none [Asense-Vsense ___ %] : Asense-Vsense [unfilled]% [Asense-Vpace ___ %] : Asense-Vpace [unfilled]% [Apace-Vsense ___ %] : Apace-Vsense [unfilled]% [Apace-Vpace ___ %] : Apace-Vpace [unfilled]% [de-identified] : [employer]  [de-identified] : [employer]  [de-identified] : [id number] [de-identified] : 7/11/17 [de-identified] :  [de-identified] : 8.5 years to [facility]  [de-identified] : no events

## 2019-03-19 NOTE — REVIEW OF SYSTEMS
[see HPI] : see HPI [Negative] : Heme/Lymph [Fever] : no fever [Chills] : no chills [Feeling Fatigued] : not feeling fatigued [Dizziness] : no dizziness [Convulsions] : no convulsions

## 2019-03-19 NOTE — HISTORY OF PRESENT ILLNESS
[Palpitations] : no palpitations [SOB] : no dyspnea [Syncope] : no syncope [Dizziness] : no dizziness [Chest Pain] : no chest pain or discomfort [Shoulder Pain] : no shoulder pain [Pain at Site] : no pain at device site [Erythema at Site] : no erythema at device site [Swelling at Site] : no swelling at device site [FreeTextEntry1] : Mr. 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 Mccloud initially presented after wearing an event monitor with no significant arrhythmias; secondary to conduction system abnormalities. He denied any history of syncope or near syncope. He ultimately underwent an EP study which showed significant conduction system disease and underwent pacemaker implant. He presents today for routine follow up and denies any device related complaints. No SOB, chest pain, syncope, near syncope or palpitations.  \par \par  \par

## 2019-03-19 NOTE — DISCUSSION/SUMMARY
[Pacemaker Function Normal] : normal pacemaker function [FreeTextEntry1] : Mr. 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 Pacemaker interrogation reveals normal function. All measured data is within normal limits.  No events for review.  No device parameter changes or medication changes made today..  He will follow up in 6 months or sooner if needed. He knows to call with any questions or concerns in the interm.  \par \par

## 2019-04-13 ENCOUNTER — INPATIENT (INPATIENT)
Facility: HOSPITAL | Age: 84
LOS: 11 days | Discharge: NURSING, REHAB OR LONG-TERM CARE | DRG: 871 | End: 2019-04-25
Attending: STUDENT IN AN ORGANIZED HEALTH CARE EDUCATION/TRAINING PROGRAM | Admitting: STUDENT IN AN ORGANIZED HEALTH CARE EDUCATION/TRAINING PROGRAM
Payer: MEDICARE

## 2019-04-13 VITALS
WEIGHT: 169.98 LBS | TEMPERATURE: 98 F | SYSTOLIC BLOOD PRESSURE: 107 MMHG | RESPIRATION RATE: 17 BRPM | HEART RATE: 88 BPM | DIASTOLIC BLOOD PRESSURE: 71 MMHG | OXYGEN SATURATION: 95 %

## 2019-04-13 LAB
ALBUMIN SERPL ELPH-MCNC: 3.1 G/DL — LOW (ref 3.3–5)
ALBUMIN SERPL ELPH-MCNC: 4.3 G/DL — SIGNIFICANT CHANGE UP (ref 3.3–5)
ALP SERPL-CCNC: 182 U/L — HIGH (ref 40–120)
ALP SERPL-CCNC: 79 U/L — SIGNIFICANT CHANGE UP (ref 40–120)
ALT FLD-CCNC: 47 U/L — HIGH (ref 10–45)
ALT FLD-CCNC: 60 U/L — HIGH (ref 10–45)
ANION GAP SERPL CALC-SCNC: 11 MMOL/L — SIGNIFICANT CHANGE UP (ref 5–17)
ANION GAP SERPL CALC-SCNC: 15 MMOL/L — SIGNIFICANT CHANGE UP (ref 5–17)
ANISOCYTOSIS BLD QL: SLIGHT — SIGNIFICANT CHANGE UP
APPEARANCE UR: CLEAR — SIGNIFICANT CHANGE UP
APTT BLD: 31.1 SEC — SIGNIFICANT CHANGE UP (ref 27.5–36.3)
AST SERPL-CCNC: 174 U/L — HIGH (ref 10–40)
AST SERPL-CCNC: 238 U/L — HIGH (ref 10–40)
BACTERIA # UR AUTO: PRESENT /HPF
BASE EXCESS BLDV CALC-SCNC: -0.3 MMOL/L — SIGNIFICANT CHANGE UP
BASOPHILS # BLD AUTO: 0 K/UL — SIGNIFICANT CHANGE UP (ref 0–0.2)
BASOPHILS NFR BLD AUTO: 0 % — SIGNIFICANT CHANGE UP (ref 0–2)
BILIRUB SERPL-MCNC: 0.9 MG/DL — SIGNIFICANT CHANGE UP (ref 0.2–1.2)
BILIRUB SERPL-MCNC: 1.6 MG/DL — HIGH (ref 0.2–1.2)
BILIRUB UR-MCNC: NEGATIVE — SIGNIFICANT CHANGE UP
BUN SERPL-MCNC: 29 MG/DL — HIGH (ref 7–23)
BUN SERPL-MCNC: 36 MG/DL — HIGH (ref 7–23)
BURR CELLS BLD QL SMEAR: PRESENT — SIGNIFICANT CHANGE UP
CA-I SERPL-SCNC: 1.16 MMOL/L — SIGNIFICANT CHANGE UP (ref 1.12–1.3)
CALCIUM SERPL-MCNC: 10 MG/DL — SIGNIFICANT CHANGE UP (ref 8.4–10.5)
CALCIUM SERPL-MCNC: 8.5 MG/DL — SIGNIFICANT CHANGE UP (ref 8.4–10.5)
CHLORIDE SERPL-SCNC: 104 MMOL/L — SIGNIFICANT CHANGE UP (ref 96–108)
CHLORIDE SERPL-SCNC: 111 MMOL/L — HIGH (ref 96–108)
CK MB CFR SERPL CALC: 48.8 NG/ML — HIGH (ref 0–6.7)
CK SERPL-CCNC: 6959 U/L — HIGH (ref 30–200)
CK SERPL-CCNC: 7744 U/L — HIGH (ref 30–200)
CO2 SERPL-SCNC: 20 MMOL/L — LOW (ref 22–31)
CO2 SERPL-SCNC: 23 MMOL/L — SIGNIFICANT CHANGE UP (ref 22–31)
COLOR SPEC: YELLOW — SIGNIFICANT CHANGE UP
CREAT SERPL-MCNC: 0.72 MG/DL — SIGNIFICANT CHANGE UP (ref 0.5–1.3)
CREAT SERPL-MCNC: 0.92 MG/DL — SIGNIFICANT CHANGE UP (ref 0.5–1.3)
DIFF PNL FLD: ABNORMAL
EOSINOPHIL # BLD AUTO: 0 K/UL — SIGNIFICANT CHANGE UP (ref 0–0.5)
EOSINOPHIL NFR BLD AUTO: 0 % — SIGNIFICANT CHANGE UP (ref 0–6)
EPI CELLS # UR: SIGNIFICANT CHANGE UP /HPF (ref 0–5)
GAS PNL BLDV: 139 MMOL/L — SIGNIFICANT CHANGE UP (ref 138–146)
GAS PNL BLDV: SIGNIFICANT CHANGE UP
GLUCOSE SERPL-MCNC: 127 MG/DL — HIGH (ref 70–99)
GLUCOSE SERPL-MCNC: 191 MG/DL — HIGH (ref 70–99)
GLUCOSE UR QL: NEGATIVE — SIGNIFICANT CHANGE UP
GRAN CASTS # UR COMP ASSIST: ABNORMAL /LPF
HCO3 BLDV-SCNC: 24 MMOL/L — SIGNIFICANT CHANGE UP (ref 20–27)
HCT VFR BLD CALC: 45.7 % — SIGNIFICANT CHANGE UP (ref 39–50)
HGB BLD-MCNC: 15 G/DL — SIGNIFICANT CHANGE UP (ref 13–17)
INR BLD: 1.35 — HIGH (ref 0.88–1.16)
KETONES UR-MCNC: ABNORMAL MG/DL
LACTATE SERPL-SCNC: 1.8 MMOL/L — SIGNIFICANT CHANGE UP (ref 0.5–2)
LACTATE SERPL-SCNC: 3.2 MMOL/L — HIGH (ref 0.5–2)
LACTATE SERPL-SCNC: 4.9 MMOL/L — CRITICAL HIGH (ref 0.5–2)
LEUKOCYTE ESTERASE UR-ACNC: NEGATIVE — SIGNIFICANT CHANGE UP
LYMPHOCYTES # BLD AUTO: 1.16 K/UL — SIGNIFICANT CHANGE UP (ref 1–3.3)
LYMPHOCYTES # BLD AUTO: 4.4 % — LOW (ref 13–44)
MANUAL SMEAR VERIFICATION: SIGNIFICANT CHANGE UP
MCHC RBC-ENTMCNC: 30.1 PG — SIGNIFICANT CHANGE UP (ref 27–34)
MCHC RBC-ENTMCNC: 32.8 GM/DL — SIGNIFICANT CHANGE UP (ref 32–36)
MCV RBC AUTO: 91.8 FL — SIGNIFICANT CHANGE UP (ref 80–100)
MONOCYTES # BLD AUTO: 0.45 K/UL — SIGNIFICANT CHANGE UP (ref 0–0.9)
MONOCYTES NFR BLD AUTO: 1.7 % — LOW (ref 2–14)
MYELOCYTES NFR BLD: 0.9 % — HIGH (ref 0–0)
NEUTROPHILS # BLD AUTO: 24.51 K/UL — HIGH (ref 1.8–7.4)
NEUTROPHILS NFR BLD AUTO: 90.4 % — HIGH (ref 43–77)
NEUTS BAND # BLD: 2.6 % — SIGNIFICANT CHANGE UP (ref 0–8)
NITRITE UR-MCNC: NEGATIVE — SIGNIFICANT CHANGE UP
OB PNL STL: NEGATIVE — SIGNIFICANT CHANGE UP
OVALOCYTES BLD QL SMEAR: SLIGHT — SIGNIFICANT CHANGE UP
PCO2 BLDV: 36 MMHG — LOW (ref 41–51)
PH BLDV: 7.43 — SIGNIFICANT CHANGE UP (ref 7.32–7.43)
PH UR: 6 — SIGNIFICANT CHANGE UP (ref 5–8)
PLAT MORPH BLD: ABNORMAL
PLATELET # BLD AUTO: 183 K/UL — SIGNIFICANT CHANGE UP (ref 150–400)
PLATELET COUNT - ESTIMATE: NORMAL — SIGNIFICANT CHANGE UP
PO2 BLDV: 22 MMHG — SIGNIFICANT CHANGE UP
POIKILOCYTOSIS BLD QL AUTO: SLIGHT — SIGNIFICANT CHANGE UP
POTASSIUM BLDV-SCNC: 3.9 MMOL/L — SIGNIFICANT CHANGE UP (ref 3.5–4.9)
POTASSIUM SERPL-MCNC: 4 MMOL/L — SIGNIFICANT CHANGE UP (ref 3.5–5.3)
POTASSIUM SERPL-MCNC: 4.8 MMOL/L — SIGNIFICANT CHANGE UP (ref 3.5–5.3)
POTASSIUM SERPL-SCNC: 4 MMOL/L — SIGNIFICANT CHANGE UP (ref 3.5–5.3)
POTASSIUM SERPL-SCNC: 4.8 MMOL/L — SIGNIFICANT CHANGE UP (ref 3.5–5.3)
PROT SERPL-MCNC: 5.9 G/DL — LOW (ref 6–8.3)
PROT SERPL-MCNC: 7.6 G/DL — SIGNIFICANT CHANGE UP (ref 6–8.3)
PROT UR-MCNC: 100 MG/DL
PROTHROM AB SERPL-ACNC: 15.4 SEC — HIGH (ref 10–12.9)
RBC # BLD: 4.98 M/UL — SIGNIFICANT CHANGE UP (ref 4.2–5.8)
RBC # FLD: 14.6 % — HIGH (ref 10.3–14.5)
RBC BLD AUTO: ABNORMAL
RBC CASTS # UR COMP ASSIST: ABNORMAL /HPF
SAO2 % BLDV: 25 % — SIGNIFICANT CHANGE UP
SODIUM SERPL-SCNC: 142 MMOL/L — SIGNIFICANT CHANGE UP (ref 135–145)
SODIUM SERPL-SCNC: 142 MMOL/L — SIGNIFICANT CHANGE UP (ref 135–145)
SP GR SPEC: >=1.03 — SIGNIFICANT CHANGE UP (ref 1–1.03)
TROPONIN T SERPL-MCNC: 0.01 NG/ML — SIGNIFICANT CHANGE UP (ref 0–0.01)
UROBILINOGEN FLD QL: 0.2 E.U./DL — SIGNIFICANT CHANGE UP
WBC # BLD: 26.35 K/UL — HIGH (ref 3.8–10.5)
WBC # FLD AUTO: 26.35 K/UL — HIGH (ref 3.8–10.5)
WBC UR QL: < 5 /HPF — SIGNIFICANT CHANGE UP

## 2019-04-13 PROCEDURE — 71045 X-RAY EXAM CHEST 1 VIEW: CPT | Mod: 26,76

## 2019-04-13 PROCEDURE — 70450 CT HEAD/BRAIN W/O DYE: CPT | Mod: 26

## 2019-04-13 PROCEDURE — 93010 ELECTROCARDIOGRAM REPORT: CPT

## 2019-04-13 PROCEDURE — 99291 CRITICAL CARE FIRST HOUR: CPT

## 2019-04-13 PROCEDURE — 99223 1ST HOSP IP/OBS HIGH 75: CPT | Mod: GC

## 2019-04-13 PROCEDURE — 72149 MRI LUMBAR SPINE W/DYE: CPT | Mod: 26

## 2019-04-13 PROCEDURE — 93280 PM DEVICE PROGR EVAL DUAL: CPT | Mod: 26

## 2019-04-13 PROCEDURE — 72133 CT LUMBAR SPINE W/O & W/DYE: CPT | Mod: 26

## 2019-04-13 RX ORDER — SODIUM CHLORIDE 9 MG/ML
1000 INJECTION INTRAMUSCULAR; INTRAVENOUS; SUBCUTANEOUS
Qty: 0 | Refills: 0 | Status: DISCONTINUED | OUTPATIENT
Start: 2019-04-13 | End: 2019-04-14

## 2019-04-13 RX ORDER — ACETAMINOPHEN 500 MG
1000 TABLET ORAL ONCE
Qty: 0 | Refills: 0 | Status: COMPLETED | OUTPATIENT
Start: 2019-04-13 | End: 2019-04-13

## 2019-04-13 RX ORDER — SODIUM CHLORIDE 9 MG/ML
1000 INJECTION INTRAMUSCULAR; INTRAVENOUS; SUBCUTANEOUS ONCE
Qty: 0 | Refills: 0 | Status: COMPLETED | OUTPATIENT
Start: 2019-04-13 | End: 2019-04-13

## 2019-04-13 RX ORDER — VANCOMYCIN HCL 1 G
1000 VIAL (EA) INTRAVENOUS ONCE
Qty: 0 | Refills: 0 | Status: COMPLETED | OUTPATIENT
Start: 2019-04-13 | End: 2019-04-13

## 2019-04-13 RX ORDER — AZITHROMYCIN 500 MG/1
500 TABLET, FILM COATED ORAL ONCE
Qty: 0 | Refills: 0 | Status: COMPLETED | OUTPATIENT
Start: 2019-04-13 | End: 2019-04-13

## 2019-04-13 RX ORDER — CEFTRIAXONE 500 MG/1
1 INJECTION, POWDER, FOR SOLUTION INTRAMUSCULAR; INTRAVENOUS ONCE
Qty: 0 | Refills: 0 | Status: COMPLETED | OUTPATIENT
Start: 2019-04-13 | End: 2019-04-13

## 2019-04-13 RX ADMIN — SODIUM CHLORIDE 1000 MILLILITER(S): 9 INJECTION INTRAMUSCULAR; INTRAVENOUS; SUBCUTANEOUS at 11:47

## 2019-04-13 RX ADMIN — SODIUM CHLORIDE 125 MILLILITER(S): 9 INJECTION INTRAMUSCULAR; INTRAVENOUS; SUBCUTANEOUS at 11:47

## 2019-04-13 RX ADMIN — AZITHROMYCIN 255 MILLIGRAM(S): 500 TABLET, FILM COATED ORAL at 11:52

## 2019-04-13 RX ADMIN — SODIUM CHLORIDE 1000 MILLILITER(S): 9 INJECTION INTRAMUSCULAR; INTRAVENOUS; SUBCUTANEOUS at 13:16

## 2019-04-13 RX ADMIN — CEFTRIAXONE 100 GRAM(S): 500 INJECTION, POWDER, FOR SOLUTION INTRAMUSCULAR; INTRAVENOUS at 11:46

## 2019-04-13 RX ADMIN — CEFTRIAXONE 1 GRAM(S): 500 INJECTION, POWDER, FOR SOLUTION INTRAMUSCULAR; INTRAVENOUS at 12:53

## 2019-04-13 RX ADMIN — Medication 400 MILLIGRAM(S): at 12:53

## 2019-04-13 RX ADMIN — AZITHROMYCIN 500 MILLIGRAM(S): 500 TABLET, FILM COATED ORAL at 13:16

## 2019-04-13 RX ADMIN — Medication 250 MILLIGRAM(S): at 15:23

## 2019-04-13 RX ADMIN — Medication 1000 MILLIGRAM(S): at 13:16

## 2019-04-13 NOTE — ED PROVIDER NOTE - CROS ED NEURO POS
bilat LE weakness x 1 day- reported UE weakness x 1 day as well which resolved/DIFFICULTY WALKING / IMBALANCE

## 2019-04-13 NOTE — CONSULT NOTE ADULT - SUBJECTIVE AND OBJECTIVE BOX
CRITICAL CARE SERVICE INITIAL CONSULT NOTE    HPI: History obtained from family at bedside. Patient is a 89 yo M, Hx of HTN, PPM for SSS, Glaucoma in both eyes (multiple surgeries at [facility]), pyogenic granuloma of eyelid s/p surgery, HLD, dementia who presents with weakness after a concert, LBP, AMS. Patient is no longer able to answer questions appropriately or follow commands, so li        REVIEW OF SYSTEMS:   unable to obtain as above.    PAST MEDICAL HISTORY:     PAST SURGICAL HISTORY:    FAMILY HISTORY:    SOCIAL HISTORY:  Tobacco use:   EtOH use:  Illicit drug use:    MEDICATIONS:  MEDICATIONS  (STANDING):  acetaminophen  IVPB .. 1000 milliGRAM(s) IV Intermittent once  sodium chloride 0.9%. 1000 milliLiter(s) (125 mL/Hr) IV Continuous <Continuous>    MEDICATIONS  (PRN):      ALLERGIES:  Allergies    No Known Allergies    Intolerances        VITAL SIGNS:  Vital Signs Last 24 Hrs  T(C): 39.2 (13 Apr 2019 11:00), Max: 39.2 (13 Apr 2019 11:00)  T(F): 102.5 (13 Apr 2019 11:00), Max: 102.5 (13 Apr 2019 11:00)  HR: 99 (13 Apr 2019 11:59) (88 - 99)  BP: 138/90 (13 Apr 2019 11:59) (107/71 - 138/90)  BP(mean): --  RR: 17 (13 Apr 2019 11:59) (17 - 17)  SpO2: 95% (13 Apr 2019 11:59) (95% - 95%)      PHYSICAL EXAM:  Constitutional: WDWN resting comfortably in bed; NAD  Head: NC/AT  Eyes: PERRL, EOMI, anicteric sclera  ENT: no nasal discharge; uvula midline, no oropharyngeal erythema or exudates; MMM  Neck: supple; no JVD or thyromegaly  Respiratory: CTA B/L; no W/R/R, no retractions  Cardiac: +S1/S2; RRR; no M/R/G; PMI non-displaced  Gastrointestinal: abdomen soft, NT/ND; no rebound or guarding; +BSx4  Genitourinary: normal external genitalia  Back: spine midline, no bony tenderness or step-offs; no CVAT B/L  Extremities: WWP, no clubbing or cyanosis; no peripheral edema  Musculoskeletal: NROM x4; no joint swelling, tenderness or erythema  Vascular: 2+ radial, femoral, DP/PT pulses B/L  Dermatologic: skin warm, dry and intact; no rashes, wounds, or scars  Lymphatic: no submandibular or cervical LAD  Neurologic: AAOx3; CNII-XII grossly intact; no focal deficits  Psychiatric: affect and characteristics of appearance, verbalizations, behaviors are appropriate    LABS:                        15.0   26.35 )-----------( 183      ( 13 Apr 2019 10:49 )             45.7     04-13    142  |  104  |  36<H>  ----------------------------<  191<H>  4.8   |  23  |  0.92    Ca    10.0      13 Apr 2019 10:49    TPro  7.6  /  Alb  4.3  /  TBili  1.6<H>  /  DBili  0.3<H>  /  AST  174<H>  /  ALT  47<H>  /  AlkPhos  182<H>  04-13    PT/INR - ( 13 Apr 2019 10:49 )   PT: 15.4 sec;   INR: 1.35          PTT - ( 13 Apr 2019 10:49 )  PTT:31.1 sec    CARDIAC MARKERS ( 13 Apr 2019 10:49 )  x     / 0.01 ng/mL / 6959 U/L / x     / 48.8 ng/mL      CAPILLARY BLOOD GLUCOSE              RADIOLOGY & ADDITIONAL TESTS: Reviewed. CRITICAL CARE SERVICE INITIAL CONSULT NOTE    HPI: History obtained from family at bedside. Patient is a 89 yo M, Hx of HTN, PPM for SSS, Glaucoma in both eyes (multiple surgeries at [facility]), pyogenic granuloma of eyelid s/p surgery, HLD, dementia who presents with weakness after a concert, LBP, AMS. Patient is no longer able to answer questions appropriately or follow commands, so brought in by neighbor who called wife. Patient grasping around the room and possibly hallucinating, reacting to unseen stimuli. Patient's wife states this is far from his baseline, and that it was getting worse since last night. Patient's wife denies that the patient had any acute complaints other than the back pain and leg weakness as previously stated        REVIEW OF SYSTEMS:   unable to obtain as above.    PAST MEDICAL HISTORY:  As above    PAST SURGICAL HISTORY: As above    FAMILY HISTORY: No pertinent history in first degree relatives    SOCIAL HISTORY:  Tobacco use: Denies  EtOH use: Denies  Illicit drug use: Denies    MEDICATIONS:  MEDICATIONS  (STANDING):  acetaminophen  IVPB .. 1000 milliGRAM(s) IV Intermittent once  sodium chloride 0.9%. 1000 milliLiter(s) (125 mL/Hr) IV Continuous <Continuous>    MEDICATIONS  (PRN):      ALLERGIES:  Allergies    No Known Allergies    Intolerances        VITAL SIGNS:  Vital Signs Last 24 Hrs  T(C): 39.2 (13 Apr 2019 11:00), Max: 39.2 (13 Apr 2019 11:00)  T(F): 102.5 (13 Apr 2019 11:00), Max: 102.5 (13 Apr 2019 11:00)  HR: 99 (13 Apr 2019 11:59) (88 - 99)  BP: 138/90 (13 Apr 2019 11:59) (107/71 - 138/90)  BP(mean): --  RR: 17 (13 Apr 2019 11:59) (17 - 17)  SpO2: 95% (13 Apr 2019 11:59) (95% - 95%)      PHYSICAL EXAM:  Constitutional: Elderly ill appearing male, reacting to stimuli   Head: NC/AT  Eyes: Multiple surgeries bilateral eyes and eye lids, possible pyogenic exudate on left eyelid. a  ENT: no nasal discharge; uvula midline, no oropharyngeal erythema or exudates; Dry mucous membranes  Neck: supple; no JVD or thyromegaly  Respiratory: CTA B/L; no W/R/R, no retractions, mild wheeze at end expiration  Cardiac: +S1/S2; Tachycaridc ; no M/R/G; PMI non-displaced  Gastrointestinal: abdomen soft, NT/ND; no rebound or guarding; +BSx4  Genitourinary: Urinated on self, lost control of bladder which wife says is abnormal, normal external genitalia, rectal exam with positive tone appropriate for age  Back: spine midline, tender to palpation in lower back but unable to localize, possible cva tenderness vs point tenderness  Extremities: WWP, no clubbing or cyanosis; no peripheral edema  Musculoskeletal: unable to move b/l le extremities but can wiggle toes bilaterally and flex ankles   Vascular: 2+ radial, femoral, DP/PT pulses B/L  Dermatologic: skin warm, dry and intact; no rashes, wounds, or scars  Lymphatic: no submandibular or cervical LAD  Neurologic: AAOx0, responding to unseen stimuli      LABS:                        15.0   26.35 )-----------( 183      ( 13 Apr 2019 10:49 )             45.7     04-13    142  |  104  |  36<H>  ----------------------------<  191<H>  4.8   |  23  |  0.92    Ca    10.0      13 Apr 2019 10:49    TPro  7.6  /  Alb  4.3  /  TBili  1.6<H>  /  DBili  0.3<H>  /  AST  174<H>  /  ALT  47<H>  /  AlkPhos  182<H>  04-13    PT/INR - ( 13 Apr 2019 10:49 )   PT: 15.4 sec;   INR: 1.35          PTT - ( 13 Apr 2019 10:49 )  PTT:31.1 sec    CARDIAC MARKERS ( 13 Apr 2019 10:49 )  x     / 0.01 ng/mL / 6959 U/L / x     / 48.8 ng/mL      CAPILLARY BLOOD GLUCOSE              RADIOLOGY & ADDITIONAL TESTS: Reviewed.

## 2019-04-13 NOTE — ED ADULT NURSE NOTE - OBJECTIVE STATEMENT
89 y/o M a&ox2 to self/ time BIBA c/o generalized weakness since 1300 yesterday. DNR/DNI. c/o of lower back pain, unable to ambulate as of yesterday. Unbale to life legs bilaterally. Pt able to move R and L toes. Incontinence x1 w. Denies falls. no arm pronator drift, no slurred speech, no facail asymmetry,. Pt denies fevers, chills, SOB, chest pain, n/v/d. Hx of dementia & Pacemaker.

## 2019-04-13 NOTE — ED PROVIDER NOTE - PSYCHIATRIC, MLM
General: Alert and oriented to person, place, time/situation. normal mood and affect. no apparent risk to self or others.

## 2019-04-13 NOTE — ED ADULT TRIAGE NOTE - OTHER COMPLAINTS
generalized weakness. no headache, no cp, no sob, no fever nor chills. no facial droop, no slurring of speech, strength equal. hx HTN, dementia, prostate CA, pacemaker

## 2019-04-13 NOTE — CONSULT NOTE ADULT - SUBJECTIVE AND OBJECTIVE BOX
Orthopaedic Consult Note    Consult Requested by: ER  Surgeon: Yony    CC:Patient is a 89y old  Male who presents with a chief complaint of AMS, B/L LE weakness (13 Apr 2019 12:34)      HPI  · HPI Objective Statement: 89 yo male DNR/DNI hx of HTN- hx of PPM for SSS -bibems with hx of " weakness" since 1 pm yesterday - c/o of low back pain  no falls or trauma   no fevers or chills  was at a concert from 11-1 pm- afterwards had diff walking per neighbor who escorted him there--also low back pain  as well --also some upper extremity weakness  no confusion no aphasia  no facial droop yesterday  no prior hx of TIA/ CVA  has chronic rectal and urinary incont  ? cough slight sob in ED  no abd pain or diarrhea   no report of fevers or chills	        PAST MEDICAL & SURGICAL HISTORY:  RBBB (right bundle branch block with left anterior fascicular block)  HLD (hyperlipidemia)  Essential hypertension    Allergies    No Known Allergies    Intolerances      MEDICATIONS  (STANDING):  sodium chloride 0.9%. 1000 milliLiter(s) (125 mL/Hr) IV Continuous <Continuous>      Vital Signs Last 24 Hrs  T(C): 37.1 (13 Apr 2019 13:46), Max: 39.2 (13 Apr 2019 11:00)  T(F): 98.7 (13 Apr 2019 13:46), Max: 102.5 (13 Apr 2019 11:00)  HR: 96 (13 Apr 2019 17:23) (88 - 103)  BP: 115/72 (13 Apr 2019 17:23) (107/71 - 138/90)  BP(mean): --  RR: 18 (13 Apr 2019 17:23) (17 - 20)  SpO2: 99% (13 Apr 2019 17:23) (95% - 99%)    Physical Exam:  General: NAD, alert & oriented x 3  TTP over spine midline over thoracolumbar junction  BL LE    Motor: 5/5 GS/TA/EHL/FHL    RLE: 2/5 hip flexors, knee flexion, 4/5 knee e xtension  LLE: 2/5 hip flexors, knee flexion, 4/5 knee extension  Sensation: SILT s/sp/dp/tib  Pulses:  DP/PT 2+ , toes/foot WWP  Rectal tone normal    BL UE    Motor: 5/5 wrist flexion, extension/, palmar intrinsics/bicep/tricep/delt  Sensation: SILT med/uln/rad/musc/axillary   Pulses:  rad/brach 2+ , fingers/hand WWP                              15.0   26.35 )-----------( 183      ( 13 Apr 2019 10:49 )             45.7     04-13    142  |  104  |  36<H>  ----------------------------<  191<H>  4.8   |  23  |  0.92    Ca    10.0      13 Apr 2019 10:49    TPro  7.6  /  Alb  4.3  /  TBili  1.6<H>  /  DBili  0.3<H>  /  AST  174<H>  /  ALT  47<H>  /  AlkPhos  182<H>  04-13      Imaging: CT showing possible early osteomyelitis/disciits T12/L2    A/P: 89yMale w/ possible disciitis/osteomyelitis   -need MRI to r/o epidural abscess  -if only disciitis/osteo treatment is IR biopsy and IV antibiotics   -will continue to follow  Discussed with chief/attending  Ortho Pager: [phone] Orthopaedic Consult Note    Consult Requested by: ER  Surgeon: Yony    CC:Patient is a 89y old  Male who presents with a chief complaint of AMS, B/L LE weakness (13 Apr 2019 12:34)      HPI  · HPI Objective Statement: 89 yo male DNR/DNI hx of HTN- hx of PPM for SSS -bibems with hx of " weakness" since 1 pm yesterday - c/o of low back pain  no falls or trauma   no fevers or chills  was at a concert from 11-1 pm- afterwards had diff walking per neighbor who escorted him there--also low back pain  as well --also some upper extremity weakness  no confusion no aphasia  no facial droop yesterday  no prior hx of TIA/ CVA  has chronic rectal and urinary incont  ? cough slight sob in ED  no abd pain or diarrhea   no report of fevers or chills	        PAST MEDICAL & SURGICAL HISTORY:  RBBB (right bundle branch block with left anterior fascicular block)  HLD (hyperlipidemia)  Essential hypertension    Allergies    No Known Allergies    Intolerances      MEDICATIONS  (STANDING):  sodium chloride 0.9%. 1000 milliLiter(s) (125 mL/Hr) IV Continuous <Continuous>      Vital Signs Last 24 Hrs  T(C): 37.1 (13 Apr 2019 13:46), Max: 39.2 (13 Apr 2019 11:00)  T(F): 98.7 (13 Apr 2019 13:46), Max: 102.5 (13 Apr 2019 11:00)  HR: 96 (13 Apr 2019 17:23) (88 - 103)  BP: 115/72 (13 Apr 2019 17:23) (107/71 - 138/90)  BP(mean): --  RR: 18 (13 Apr 2019 17:23) (17 - 20)  SpO2: 99% (13 Apr 2019 17:23) (95% - 99%)    Physical Exam:  General: NAD, alert & oriented x 3  TTP over spine midline over thoracolumbar junction  BL LE    Motor: 5/5 GS/TA/EHL/FHL    RLE: 2/5 hip flexors, knee flexion, 4/5 knee e xtension  LLE: 2/5 hip flexors, knee flexion, 4/5 knee extension  Sensation: SILT s/sp/dp/tib  Pulses:  DP/PT 2+ , toes/foot WWP  Rectal tone normal    BL UE    Motor: 5/5 wrist flexion, extension/, palmar intrinsics/bicep/tricep/delt  Sensation: SILT med/uln/rad/musc/axillary   Pulses:  rad/brach 2+ , fingers/hand WWP                              15.0   26.35 )-----------( 183      ( 13 Apr 2019 10:49 )             45.7     04-13    142  |  104  |  36<H>  ----------------------------<  191<H>  4.8   |  23  |  0.92    Ca    10.0      13 Apr 2019 10:49    TPro  7.6  /  Alb  4.3  /  TBili  1.6<H>  /  DBili  0.3<H>  /  AST  174<H>  /  ALT  47<H>  /  AlkPhos  182<H>  04-13      Imaging: CT showing possible early osteomyelitis/disciits T12/L2. MRI showing T12-L1 disc edema without endplate erosion     A/P: 89yMale w/ proximal muscle weakness BL LEs, back pain  -no epidural abscess on MRI  -unlikely disciitis given lack of end plate erosion  -if cannot rule out other source of infection, would get IR biopsy of disc   -no surgical intervention at this time  -will continue to follow  Discussed with chief/attending  Ortho Pager: [phone] Orthopaedic Consult Note    Consult Requested by: ER  Surgeon: Yony    CC:Patient is a 89y old  Male who presents with a chief complaint of AMS, B/L LE weakness (13 Apr 2019 12:34)      HPI  · HPI Objective Statement: 89 yo male DNR/DNI hx of HTN- hx of PPM for SSS -bibems with hx of " weakness" since 1 pm yesterday - c/o of low back pain  no falls or trauma   no fevers or chills  was at a concert from 11-1 pm- afterwards had diff walking per neighbor who escorted him there--also low back pain  as well --also some upper extremity weakness  no confusion no aphasia  no facial droop yesterday  no prior hx of TIA/ CVA  has chronic rectal and urinary incont  ? cough slight sob in ED  no abd pain or diarrhea   no report of fevers or chills	        PAST MEDICAL & SURGICAL HISTORY:  RBBB (right bundle branch block with left anterior fascicular block)  HLD (hyperlipidemia)  Essential hypertension    Allergies    No Known Allergies    Intolerances      MEDICATIONS  (STANDING):  sodium chloride 0.9%. 1000 milliLiter(s) (125 mL/Hr) IV Continuous <Continuous>      Vital Signs Last 24 Hrs  T(C): 37.1 (13 Apr 2019 13:46), Max: 39.2 (13 Apr 2019 11:00)  T(F): 98.7 (13 Apr 2019 13:46), Max: 102.5 (13 Apr 2019 11:00)  HR: 96 (13 Apr 2019 17:23) (88 - 103)  BP: 115/72 (13 Apr 2019 17:23) (107/71 - 138/90)  BP(mean): --  RR: 18 (13 Apr 2019 17:23) (17 - 20)  SpO2: 99% (13 Apr 2019 17:23) (95% - 99%)    Physical Exam:  General: NAD, alert & oriented x 3  TTP over spine midline over thoracolumbar junction  BL LE    Motor: 5/5 GS/TA/EHL/FHL    RLE: 2/5 hip flexors, knee flexion, 4/5 knee e xtension  LLE: 2/5 hip flexors, knee flexion, 4/5 knee extension  Sensation: SILT s/sp/dp/tib  Pulses:  DP/PT 2+ , toes/foot WWP  Rectal tone normal    BL UE    Motor: 5/5 wrist flexion, extension/, palmar intrinsics/bicep/tricep/delt  Sensation: SILT med/uln/rad/musc/axillary   Pulses:  rad/brach 2+ , fingers/hand WWP                              15.0   26.35 )-----------( 183      ( 13 Apr 2019 10:49 )             45.7     04-13    142  |  104  |  36<H>  ----------------------------<  191<H>  4.8   |  23  |  0.92    Ca    10.0      13 Apr 2019 10:49    TPro  7.6  /  Alb  4.3  /  TBili  1.6<H>  /  DBili  0.3<H>  /  AST  174<H>  /  ALT  47<H>  /  AlkPhos  182<H>  04-13      Imaging: CT showing possible early osteomyelitis/disciits T12/L2. MRI showing T12-L1 disc edema without endplate erosion     A/P: 89yMale w/ proximal muscle weakness BL LEs, back pain  -no epidural abscess on MRI  -unlikely disciitis given lack of end plate erosion  -if cannot rule out other source of infection, would get IR biopsy of disc   -consider neurology consult for weakness  -no surgical intervention at this time  -will continue to follow  Discussed with chief/attending  Ortho Pager: [phone]

## 2019-04-13 NOTE — PROCEDURE NOTE - ADDITIONAL PROCEDURE DETAILS
Called to assess if PPM was MRI compatible. Patient went for MRI of spine. Changed to MRI mode (ODO). Patient tolerated procedure well. When patient returned to ED, switched back out of MRI mode and back to DDD. Dr. Dominguez aware. Discussed with Dr. Gunter.

## 2019-04-13 NOTE — CONSULT NOTE ADULT - ASSESSMENT
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 ?Rhabdomyolysis from initial concert sitting which then compounded overnight as patient was asleep. AMS concerning for neurologic picture however CT head negative. 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 ?Rhabdomyolysis from initial concert sitting which then compounded overnight as patient was asleep. AMS concerning for neurologic picture however CT head negative. Given LE weakness and back pain, possible osteo or spinal abscess is concerning  - Treat rhabdomyolysis with IVF, trend CK to clearance  - MRI spine unable to be performed 2/2 pacemaker, will opt for CT with IV contrast due to high clinical suspicion for abscess  - Will monitor and follow up     ADDENDUM 1524pm  Spoke with ED attending, CT showing discitis cannot rule out abscess, due to high clinical suspicion agree with pursuing possible surgical intervention as patient with good quality of life prior to recent change.   Patient seen and examined at bedside with critical care attending, patient improved in terms of respiratory status and mentation, answering questions approrpiately, still unable to lift legs with focal pain in back concerning for spinal process

## 2019-04-13 NOTE — ED PROVIDER NOTE - DIAGNOSTIC INTERPRETATION
CR-- 1 view- noted bibasilar infiltates/  cardiomegaly/  nl bones and soft tissues- nl cardiac silhouette

## 2019-04-13 NOTE — ED ADULT NURSE REASSESSMENT NOTE - NS ED NURSE REASSESS COMMENT FT1
Pt appears SOB. Placed on 5 L NC Placed on CCM, Pacemaker spikes, HR in the 120s High flow nasal canula paged

## 2019-04-13 NOTE — ED ADULT NURSE NOTE - NSIMPLEMENTINTERV_GEN_ALL_ED
Implemented All Fall with Harm Risk Interventions:  [facility] to call system. Call bell, personal items and telephone within reach. Instruct patient to call for assistance. Room bathroom lighting operational. Non-slip footwear when patient is off stretcher. Physically safe environment: no spills, clutter or unnecessary equipment. Stretcher in lowest position, wheels locked, appropriate side rails in place. Provide visual cue, wrist band, yellow gown, etc. Monitor gait and stability. Monitor for mental status changes and reorient to person, place, and time. Review medications for side effects contributing to fall risk. Reinforce activity limits and safety measures with patient and family. Provide visual clues: red socks.

## 2019-04-13 NOTE — ED PROVIDER NOTE - PROGRESS NOTE DETAILS
rectal temp 102.5-  no decubs noted-- mild LS spine tenderness- CXR ? discoid atalectasis vs infilt--LS spine CT - hardware makes it diff to read per RAD-  no obv discitis ? osteo-  no air or signs of abscess spine - ortho - to eval pt spine - ortho - to eval pt  T12 L1 ? early disciitis --  no def abscess-- cards to eval pt for MRI in light of PPM   await MICU input

## 2019-04-13 NOTE — ED PROVIDER NOTE - SHIFT CHANGE DETAILS
await MRI results  - if pos for epidural abscess - oprtho to admit to 8E/8L - if negative possible 7L admit - await ICU re-eval

## 2019-04-13 NOTE — ED PROVIDER NOTE - CARE PLAN
Principal Discharge DX:	Fever  Secondary Diagnosis:	Pneumonia  Secondary Diagnosis:	Rhabdomyolysis Principal Discharge DX:	Fever  Secondary Diagnosis:	Pneumonia  Secondary Diagnosis:	Rhabdomyolysis  Secondary Diagnosis:	Back pain

## 2019-04-13 NOTE — ED ADULT NURSE REASSESSMENT NOTE - NS ED NURSE REASSESS COMMENT FT1
Pt clinical status worsened upon moving pt to side. Pt became SOB and tachycardic. Placed on CCM. Rectal temp 102.5 F, Sepsis criteria met. 2nd Peripheral IV placed. Blood cultures, VBG, and lactate sent. Fluids started. Md salgado at bedside

## 2019-04-13 NOTE — ED PROVIDER NOTE - CLINICAL SUMMARY MEDICAL DECISION MAKING FREE TEXT BOX
89 yo male DNR /DNI Prev ambulatory until yesterday ppm hx SSS with fever cough confusion and weakness x 24 hrs--weakness  upper lower extremities with back pain x 24 hrs- CXR ? LLL infilt  tachpnea 89 yo male DNR /DNI Prev ambulatory until yesterday ppm hx SSS with fever cough confusion and weakness x 24 hrs--weakness  upper lower extremities with back pain x 24 hrs- CXR ? LLL infilt  tachypnea  await MRI LS Spine to eval for epidural abscess - ortho  and MICU to re-eval

## 2019-04-13 NOTE — ED ADULT NURSE REASSESSMENT NOTE - NS ED NURSE REASSESS COMMENT FT1
Rec'd pt awake, in stable condition, breathing with ease via humidified O2 NC. Pt on cardiac monitor, VSS. Pt denies pain at this time. Pt noted with dias catheter in draining dark colored urine. HL 20 G RAC noted, patent. Pt pending MRI results/dispo. Pt and family made aware of plan of care. Pt needs met. Safety precautions in place. Close monitoring continues.

## 2019-04-13 NOTE — ED PROVIDER NOTE - OBJECTIVE STATEMENT
89 yo male DNR/DNI hx of HTN- hx of PPM for SSS -bibems with hx of " weakness" since 1 pm yesterday - c/o of low back pain  no falls or trauma   no fevers or chills  was at a concert from 11-1 pm- afterwards had diff walking per neighbor who escorted him there--also low back pain  as well --also some upper extremity weakness  no confusion no aphasia  no facial droop yesterday  no prior hx of TIA/ CVA  has chronic rectal and urinary incont 89 yo male DNR/DNI hx of HTN- hx of PPM for SSS -bibems with hx of " weakness" since 1 pm yesterday - c/o of low back pain  no falls or trauma   no fevers or chills  was at a concert from 11-1 pm- afterwards had diff walking per neighbor who escorted him there--also low back pain  as well --also some upper extremity weakness  no confusion no aphasia  no facial droop yesterday  no prior hx of TIA/ CVA  has chronic rectal and urinary incont  ? cough slight sob in ED  no abd pain or diarrhea   no report of fevers or chills

## 2019-04-13 NOTE — ED ADULT NURSE NOTE - CHPI ED NUR SYMPTOMS NEG
no nausea/no dizziness/no fever/no vomiting/no weakness/no tingling/no decreased eating/drinking/no pain/no chills

## 2019-04-14 DIAGNOSIS — M62.82 RHABDOMYOLYSIS: ICD-10-CM

## 2019-04-14 DIAGNOSIS — Z29.9 ENCOUNTER FOR PROPHYLACTIC MEASURES, UNSPECIFIED: ICD-10-CM

## 2019-04-14 DIAGNOSIS — R63.8 OTHER SYMPTOMS AND SIGNS CONCERNING FOOD AND FLUID INTAKE: ICD-10-CM

## 2019-04-14 DIAGNOSIS — I10 ESSENTIAL (PRIMARY) HYPERTENSION: ICD-10-CM

## 2019-04-14 DIAGNOSIS — R74.0 NONSPECIFIC ELEVATION OF LEVELS OF TRANSAMINASE AND LACTIC ACID DEHYDROGENASE [LDH]: ICD-10-CM

## 2019-04-14 DIAGNOSIS — I49.5 SICK SINUS SYNDROME: ICD-10-CM

## 2019-04-14 DIAGNOSIS — Z98.1 ARTHRODESIS STATUS: Chronic | ICD-10-CM

## 2019-04-14 DIAGNOSIS — E78.5 HYPERLIPIDEMIA, UNSPECIFIED: ICD-10-CM

## 2019-04-14 DIAGNOSIS — A41.9 SEPSIS, UNSPECIFIED ORGANISM: ICD-10-CM

## 2019-04-14 DIAGNOSIS — M54.9 DORSALGIA, UNSPECIFIED: ICD-10-CM

## 2019-04-14 DIAGNOSIS — Z98.890 OTHER SPECIFIED POSTPROCEDURAL STATES: Chronic | ICD-10-CM

## 2019-04-14 DIAGNOSIS — G93.49 OTHER ENCEPHALOPATHY: ICD-10-CM

## 2019-04-14 DIAGNOSIS — G93.41 METABOLIC ENCEPHALOPATHY: ICD-10-CM

## 2019-04-14 LAB
ALBUMIN SERPL ELPH-MCNC: 2.9 G/DL — LOW (ref 3.3–5)
ALP SERPL-CCNC: 83 U/L — SIGNIFICANT CHANGE UP (ref 40–120)
ALT FLD-CCNC: 71 U/L — HIGH (ref 10–45)
ANION GAP SERPL CALC-SCNC: 11 MMOL/L — SIGNIFICANT CHANGE UP (ref 5–17)
AST SERPL-CCNC: 273 U/L — HIGH (ref 10–40)
BASOPHILS # BLD AUTO: 0 K/UL — SIGNIFICANT CHANGE UP (ref 0–0.2)
BASOPHILS NFR BLD AUTO: 0 % — SIGNIFICANT CHANGE UP (ref 0–2)
BILIRUB SERPL-MCNC: 0.8 MG/DL — SIGNIFICANT CHANGE UP (ref 0.2–1.2)
BUN SERPL-MCNC: 28 MG/DL — HIGH (ref 7–23)
CALCIUM SERPL-MCNC: 8.4 MG/DL — SIGNIFICANT CHANGE UP (ref 8.4–10.5)
CHLORIDE SERPL-SCNC: 113 MMOL/L — HIGH (ref 96–108)
CK SERPL-CCNC: 3872 U/L — HIGH (ref 30–200)
CK SERPL-CCNC: 6728 U/L — HIGH (ref 30–200)
CO2 SERPL-SCNC: 21 MMOL/L — LOW (ref 22–31)
CREAT SERPL-MCNC: 0.65 MG/DL — SIGNIFICANT CHANGE UP (ref 0.5–1.3)
CULTURE RESULTS: NO GROWTH — SIGNIFICANT CHANGE UP
EOSINOPHIL # BLD AUTO: 0 K/UL — SIGNIFICANT CHANGE UP (ref 0–0.5)
EOSINOPHIL NFR BLD AUTO: 0 % — SIGNIFICANT CHANGE UP (ref 0–6)
GLUCOSE BLDC GLUCOMTR-MCNC: 103 MG/DL — HIGH (ref 70–99)
GLUCOSE BLDC GLUCOMTR-MCNC: 108 MG/DL — HIGH (ref 70–99)
GLUCOSE BLDC GLUCOMTR-MCNC: 149 MG/DL — HIGH (ref 70–99)
GLUCOSE SERPL-MCNC: 122 MG/DL — HIGH (ref 70–99)
GRAM STN FLD: SIGNIFICANT CHANGE UP
GRAM STN FLD: SIGNIFICANT CHANGE UP
HAV IGM SER-ACNC: SIGNIFICANT CHANGE UP
HBA1C BLD-MCNC: 5.7 % — HIGH (ref 4–5.6)
HBV CORE IGM SER-ACNC: SIGNIFICANT CHANGE UP
HBV SURFACE AG SER-ACNC: SIGNIFICANT CHANGE UP
HCT VFR BLD CALC: 40.3 % — SIGNIFICANT CHANGE UP (ref 39–50)
HCV AB S/CO SERPL IA: 0.05 S/CO — SIGNIFICANT CHANGE UP
HCV AB SERPL-IMP: SIGNIFICANT CHANGE UP
HGB BLD-MCNC: 12.8 G/DL — LOW (ref 13–17)
LYMPHOCYTES # BLD AUTO: 0.21 K/UL — LOW (ref 1–3.3)
LYMPHOCYTES # BLD AUTO: 0.9 % — LOW (ref 13–44)
MAGNESIUM SERPL-MCNC: 2.2 MG/DL — SIGNIFICANT CHANGE UP (ref 1.6–2.6)
MCHC RBC-ENTMCNC: 29.8 PG — SIGNIFICANT CHANGE UP (ref 27–34)
MCHC RBC-ENTMCNC: 31.8 GM/DL — LOW (ref 32–36)
MCV RBC AUTO: 93.7 FL — SIGNIFICANT CHANGE UP (ref 80–100)
METHOD TYPE: SIGNIFICANT CHANGE UP
MONOCYTES # BLD AUTO: 0.61 K/UL — SIGNIFICANT CHANGE UP (ref 0–0.9)
MONOCYTES NFR BLD AUTO: 2.6 % — SIGNIFICANT CHANGE UP (ref 2–14)
MSSA DNA SPEC QL NAA+PROBE: SIGNIFICANT CHANGE UP
NEUTROPHILS # BLD AUTO: 22.55 K/UL — HIGH (ref 1.8–7.4)
NEUTROPHILS NFR BLD AUTO: 92.2 % — HIGH (ref 43–77)
NT-PROBNP SERPL-SCNC: 870 PG/ML — HIGH (ref 0–300)
PCP SPEC-MCNC: SIGNIFICANT CHANGE UP
PHOSPHATE SERPL-MCNC: 2.9 MG/DL — SIGNIFICANT CHANGE UP (ref 2.5–4.5)
PLATELET # BLD AUTO: 138 K/UL — LOW (ref 150–400)
POTASSIUM SERPL-MCNC: 3.9 MMOL/L — SIGNIFICANT CHANGE UP (ref 3.5–5.3)
POTASSIUM SERPL-SCNC: 3.9 MMOL/L — SIGNIFICANT CHANGE UP (ref 3.5–5.3)
PROT SERPL-MCNC: 6 G/DL — SIGNIFICANT CHANGE UP (ref 6–8.3)
RBC # BLD: 4.3 M/UL — SIGNIFICANT CHANGE UP (ref 4.2–5.8)
RBC # FLD: 14.9 % — HIGH (ref 10.3–14.5)
SODIUM SERPL-SCNC: 145 MMOL/L — SIGNIFICANT CHANGE UP (ref 135–145)
SPECIMEN SOURCE: SIGNIFICANT CHANGE UP
TSH SERPL-MCNC: 2.63 UIU/ML — SIGNIFICANT CHANGE UP (ref 0.35–4.94)
VIT B12 SERPL-MCNC: 395 PG/ML — SIGNIFICANT CHANGE UP (ref 232–1245)
WBC # BLD: 23.37 K/UL — HIGH (ref 3.8–10.5)
WBC # FLD AUTO: 23.37 K/UL — HIGH (ref 3.8–10.5)

## 2019-04-14 PROCEDURE — 72147 MRI CHEST SPINE W/DYE: CPT | Mod: 26

## 2019-04-14 PROCEDURE — 72142 MRI NECK SPINE W/DYE: CPT | Mod: 26

## 2019-04-14 PROCEDURE — 99223 1ST HOSP IP/OBS HIGH 75: CPT

## 2019-04-14 PROCEDURE — 74177 CT ABD & PELVIS W/CONTRAST: CPT | Mod: 26

## 2019-04-14 PROCEDURE — 71045 X-RAY EXAM CHEST 1 VIEW: CPT | Mod: 26

## 2019-04-14 PROCEDURE — 99233 SBSQ HOSP IP/OBS HIGH 50: CPT | Mod: GC

## 2019-04-14 RX ORDER — CEFEPIME 1 G/1
2000 INJECTION, POWDER, FOR SOLUTION INTRAMUSCULAR; INTRAVENOUS ONCE
Qty: 0 | Refills: 0 | Status: COMPLETED | OUTPATIENT
Start: 2019-04-14 | End: 2019-04-14

## 2019-04-14 RX ORDER — VANCOMYCIN HCL 1 G
1000 VIAL (EA) INTRAVENOUS EVERY 12 HOURS
Qty: 0 | Refills: 0 | Status: DISCONTINUED | OUTPATIENT
Start: 2019-04-14 | End: 2019-04-14

## 2019-04-14 RX ORDER — DEXTROSE 50 % IN WATER 50 %
25 SYRINGE (ML) INTRAVENOUS ONCE
Qty: 0 | Refills: 0 | Status: DISCONTINUED | OUTPATIENT
Start: 2019-04-14 | End: 2019-04-25

## 2019-04-14 RX ORDER — DEXTROSE 50 % IN WATER 50 %
15 SYRINGE (ML) INTRAVENOUS ONCE
Qty: 0 | Refills: 0 | Status: DISCONTINUED | OUTPATIENT
Start: 2019-04-14 | End: 2019-04-25

## 2019-04-14 RX ORDER — LOSARTAN POTASSIUM 100 MG/1
100 TABLET, FILM COATED ORAL DAILY
Qty: 0 | Refills: 0 | Status: DISCONTINUED | OUTPATIENT
Start: 2019-04-14 | End: 2019-04-14

## 2019-04-14 RX ORDER — CHOLECALCIFEROL (VITAMIN D3) 125 MCG
1 CAPSULE ORAL
Qty: 0 | Refills: 0 | COMMUNITY

## 2019-04-14 RX ORDER — TIMOLOL 0.5 %
1 DROPS OPHTHALMIC (EYE) EVERY 12 HOURS
Qty: 0 | Refills: 0 | Status: DISCONTINUED | OUTPATIENT
Start: 2019-04-14 | End: 2019-04-25

## 2019-04-14 RX ORDER — POTASSIUM CHLORIDE 20 MEQ
10 PACKET (EA) ORAL ONCE
Qty: 0 | Refills: 0 | Status: COMPLETED | OUTPATIENT
Start: 2019-04-14 | End: 2019-04-14

## 2019-04-14 RX ORDER — IPRATROPIUM/ALBUTEROL SULFATE 18-103MCG
3 AEROSOL WITH ADAPTER (GRAM) INHALATION ONCE
Qty: 0 | Refills: 0 | Status: COMPLETED | OUTPATIENT
Start: 2019-04-14 | End: 2019-04-14

## 2019-04-14 RX ORDER — AMLODIPINE BESYLATE 2.5 MG/1
5 TABLET ORAL DAILY
Qty: 0 | Refills: 0 | Status: DISCONTINUED | OUTPATIENT
Start: 2019-04-14 | End: 2019-04-14

## 2019-04-14 RX ORDER — SODIUM CHLORIDE 9 MG/ML
1000 INJECTION, SOLUTION INTRAVENOUS
Qty: 0 | Refills: 0 | Status: DISCONTINUED | OUTPATIENT
Start: 2019-04-14 | End: 2019-04-14

## 2019-04-14 RX ORDER — SODIUM CHLORIDE 9 MG/ML
1000 INJECTION, SOLUTION INTRAVENOUS
Qty: 0 | Refills: 0 | Status: DISCONTINUED | OUTPATIENT
Start: 2019-04-14 | End: 2019-04-25

## 2019-04-14 RX ORDER — CEFAZOLIN SODIUM 1 G
2000 VIAL (EA) INJECTION EVERY 8 HOURS
Qty: 0 | Refills: 0 | Status: DISCONTINUED | OUTPATIENT
Start: 2019-04-14 | End: 2019-04-14

## 2019-04-14 RX ORDER — VANCOMYCIN HCL 1 G
1250 VIAL (EA) INTRAVENOUS EVERY 12 HOURS
Qty: 0 | Refills: 0 | Status: DISCONTINUED | OUTPATIENT
Start: 2019-04-14 | End: 2019-04-14

## 2019-04-14 RX ORDER — INSULIN LISPRO 100/ML
VIAL (ML) SUBCUTANEOUS
Qty: 0 | Refills: 0 | Status: DISCONTINUED | OUTPATIENT
Start: 2019-04-14 | End: 2019-04-25

## 2019-04-14 RX ORDER — HEPARIN SODIUM 5000 [USP'U]/ML
5000 INJECTION INTRAVENOUS; SUBCUTANEOUS EVERY 8 HOURS
Qty: 0 | Refills: 0 | Status: DISCONTINUED | OUTPATIENT
Start: 2019-04-14 | End: 2019-04-25

## 2019-04-14 RX ORDER — METRONIDAZOLE 500 MG
500 TABLET ORAL EVERY 8 HOURS
Qty: 0 | Refills: 0 | Status: DISCONTINUED | OUTPATIENT
Start: 2019-04-14 | End: 2019-04-14

## 2019-04-14 RX ORDER — ASPIRIN/CALCIUM CARB/MAGNESIUM 324 MG
81 TABLET ORAL DAILY
Qty: 0 | Refills: 0 | Status: DISCONTINUED | OUTPATIENT
Start: 2019-04-14 | End: 2019-04-14

## 2019-04-14 RX ORDER — GLUCAGON INJECTION, SOLUTION 0.5 MG/.1ML
1 INJECTION, SOLUTION SUBCUTANEOUS ONCE
Qty: 0 | Refills: 0 | Status: DISCONTINUED | OUTPATIENT
Start: 2019-04-14 | End: 2019-04-25

## 2019-04-14 RX ORDER — DEXTROSE 50 % IN WATER 50 %
12.5 SYRINGE (ML) INTRAVENOUS ONCE
Qty: 0 | Refills: 0 | Status: DISCONTINUED | OUTPATIENT
Start: 2019-04-14 | End: 2019-04-25

## 2019-04-14 RX ORDER — CEFAZOLIN SODIUM 1 G
VIAL (EA) INJECTION
Qty: 0 | Refills: 0 | Status: DISCONTINUED | OUTPATIENT
Start: 2019-04-14 | End: 2019-04-14

## 2019-04-14 RX ORDER — CEFTRIAXONE 500 MG/1
1 INJECTION, POWDER, FOR SOLUTION INTRAMUSCULAR; INTRAVENOUS EVERY 24 HOURS
Qty: 0 | Refills: 0 | Status: DISCONTINUED | OUTPATIENT
Start: 2019-04-14 | End: 2019-04-14

## 2019-04-14 RX ORDER — IOHEXOL 300 MG/ML
30 INJECTION, SOLUTION INTRAVENOUS ONCE
Qty: 0 | Refills: 0 | Status: COMPLETED | OUTPATIENT
Start: 2019-04-14 | End: 2019-04-14

## 2019-04-14 RX ORDER — ATORVASTATIN CALCIUM 80 MG/1
20 TABLET, FILM COATED ORAL AT BEDTIME
Qty: 0 | Refills: 0 | Status: DISCONTINUED | OUTPATIENT
Start: 2019-04-14 | End: 2019-04-14

## 2019-04-14 RX ORDER — CEFEPIME 1 G/1
2000 INJECTION, POWDER, FOR SOLUTION INTRAMUSCULAR; INTRAVENOUS EVERY 8 HOURS
Qty: 0 | Refills: 0 | Status: DISCONTINUED | OUTPATIENT
Start: 2019-04-14 | End: 2019-04-14

## 2019-04-14 RX ORDER — CEFAZOLIN SODIUM 1 G
2000 VIAL (EA) INJECTION EVERY 8 HOURS
Qty: 0 | Refills: 0 | Status: DISCONTINUED | OUTPATIENT
Start: 2019-04-14 | End: 2019-04-25

## 2019-04-14 RX ADMIN — SODIUM CHLORIDE 60 MILLILITER(S): 9 INJECTION, SOLUTION INTRAVENOUS at 10:53

## 2019-04-14 RX ADMIN — CEFEPIME 100 MILLIGRAM(S): 1 INJECTION, POWDER, FOR SOLUTION INTRAMUSCULAR; INTRAVENOUS at 08:28

## 2019-04-14 RX ADMIN — Medication 0.5 MILLIGRAM(S): at 16:00

## 2019-04-14 RX ADMIN — Medication 3 MILLILITER(S): at 15:49

## 2019-04-14 RX ADMIN — IOHEXOL 30 MILLILITER(S): 300 INJECTION, SOLUTION INTRAVENOUS at 10:54

## 2019-04-14 RX ADMIN — Medication 1 DROP(S): at 05:30

## 2019-04-14 RX ADMIN — Medication 166.67 MILLIGRAM(S): at 05:31

## 2019-04-14 RX ADMIN — HEPARIN SODIUM 5000 UNIT(S): 5000 INJECTION INTRAVENOUS; SUBCUTANEOUS at 22:40

## 2019-04-14 RX ADMIN — Medication 1 DROP(S): at 19:07

## 2019-04-14 RX ADMIN — Medication 2000 MILLIGRAM(S): at 18:06

## 2019-04-14 RX ADMIN — Medication 166.67 MILLIGRAM(S): at 16:00

## 2019-04-14 RX ADMIN — Medication 10 MILLIEQUIVALENT(S): at 15:24

## 2019-04-14 NOTE — H&P ADULT - PROBLEM SELECTOR PLAN 4
/ALT 47, initial TBili 1.6 with Alk Phos 182  -U/S Liver  -Hepatitis panel /ALT 47, initial TBili 1.6 with Alk Phos 182, likely in setting of Rhabdo /ALT 47, initial TBili 1.6 with Alk Phos 182, likely in setting of Rhabdo cause of transaminitis.  -c/w monitoring LFTs  -hep panel

## 2019-04-14 NOTE — H&P ADULT - PROBLEM SELECTOR PLAN 8
Hx of SSS s/p PPM Hx of SSS s/p PPM, PPM interrogated on presentation Hx of SSS s/p PPM, PPM interrogated on presentation and changed into MRI mode to evaluate back.

## 2019-04-14 NOTE — PROGRESS NOTE ADULT - SUBJECTIVE AND OBJECTIVE BOX
Orthopaedic Spine Resident Note    S: 89y Male no acute overnight events.  Pain controlled.  Appears to be improving on exam with LE strength noticibly improved    O:  T(C): 36.6 (04-14-19 @ 08:32), Max: 39.2 (04-13-19 @ 11:00)  HR: 80 (04-14-19 @ 08:32) (80 - 103)  BP: 127/73 (04-14-19 @ 08:32) (107/71 - 147/83)  RR: 20 (04-14-19 @ 08:32) (16 - 20)  SpO2: 97% (04-14-19 @ 08:32) (95% - 99%)  Wt(kg): --      General: NAD, alert & oriented x 3  TTP over spine midline over thoracolumbar junction  BL LE    Motor: 5/5 GS/TA/EHL/FHL    RLE: 4/5 hip flexors,4/5 knee flexion/extension  LLE: 4/5 hip flexors, 4/5 knee flexion/ extension  Sensation: SILT s/sp/dp/tib  Pulses:  DP/PT 2+ , toes/foot WWP  Rectal tone normal            A/P: 89y Male with sepsis, presumed disciitis   -exam improving, continue IV antibiotics  -MRI T spine  -IR biopsy/drainage of collection seen on MRI L spine  -follow up cultures  -trend WBC, ESR, CRP  -PT: WBAT  -Pain control  -ortho to follow  Guero Miller MD, PGY-2  Ortho pager [phone]

## 2019-04-14 NOTE — PROGRESS NOTE ADULT - PROBLEM SELECTOR PLAN 2
pt with discitis on imagine  spine following   monitor neurological exam  no evidence of cauda equina syndrome

## 2019-04-14 NOTE — CONSULT NOTE ADULT - SUBJECTIVE AND OBJECTIVE BOX
HPI:  89M with PMH of SSS s/p PPM, HTN, Glaucoma in both eyes (multiple surgeries at [facility]), pyogenic granuloma of eyelid s/p surgery, dementia presenting for b/l LE weakness and back pain in setting of altered mental status. Patient initially unsure why he was brought into ED but upon asking about symptoms of back pain and LE weakness- he states over last week developed progressively worsening lower back pain, no radiation of pain. Patient states he has had hx of LBP for which has had surgery in the past (confirmed on imaging that has prior posterior spinal fusion from L4 to S1 and prior L2-S1 laminectomy.). Reports also progressively worsening weakness for which he was unable to get out of bed today.     Upon arrival to ED, VS /71, HR 88, RR 17, T 98.4F -> later spiked to 102F rectal, O2 95% on RA. Labs notable for WBC 26.35, /ALT 47, initial TBili 1.6 with Alk Phos 182. Cr at 0.92, Lactate at 4.9. CK at 7744. Noted to be tachypneic for which placed on HFNC. CXR with questionable LLL infiltrate. CT lumbar spine with finding concerning for early discitis and/or osteomyelitis at T12-L1 level with no definitive epidural abscess. ICU Consulted for altered mental status and weakness. Ortho consulted in setting of finding concerning for discitis. MRI performed-initially with low suspicion for discitis/osteo. Therefore per ortho no intervention. Upon re-evaluation MRI demonstrated abnormal signal at T12-L1 disc with fluid expanding the disc space (i.e. lining of the disc space, especially anteriorly). No associated significant endplate erosion or marrow edema. Findings are again concerning for discitis at T12-L1 (without significant osteomyelitis at this time). Ortho reconsulted for which plan for IR biopsy but no other intervention at this time.  Patient reports that pain located in lower back above sacrum, no radiation of pain. Says he has had abdominal pain that he states sometimes is confused with his back pain. No changes in bowel habits. No dysuria- + incontinence. Denies recent fevers chills. No trauma. (14 Apr 2019 01:45)      PAST MEDICAL & SURGICAL HISTORY:  RBBB (right bundle branch block with left anterior fascicular block)  HLD (hyperlipidemia)  Essential hypertension  S/P lumbar spinal fusion  H/O laminectomy        REVIEW OF SYSTEMS:    General:	 no weakness; no fevers, no chills  Skin/Breast: no rash  Respiratory and Thorax: no SOB, no cough  Cardiovascular:	No chest pain  Gastrointestinal:	 no nausea, vomiting , diarrhea  Genitourinary:	no dysuria, no difficulty urinating, no hematuria  Musculoskeletal:	no weakness, no joint swelling/pain  Neurological:	no focal weakness/numbness  Endocrine:	no polyuria, no polydipsia      ANTIBIOTICS:  MEDICATIONS  (STANDING):  cefepime   IVPB 2000 milliGRAM(s) IV Intermittent every 8 hours  dextrose 5%. 1000 milliLiter(s) (50 mL/Hr) IV Continuous <Continuous>  dextrose 50% Injectable 12.5 Gram(s) IV Push once  dextrose 50% Injectable 25 Gram(s) IV Push once  dextrose 50% Injectable 25 Gram(s) IV Push once  insulin lispro (HumaLOG) corrective regimen sliding scale   SubCutaneous Before meals and at bedtime  timolol 0.5% Solution 1 Drop(s) Both EYES every 12 hours  vancomycin  IVPB 1250 milliGRAM(s) IV Intermittent every 12 hours    MEDICATIONS  (PRN):  dextrose 40% Gel 15 Gram(s) Oral once PRN Blood Glucose LESS THAN 70 milliGRAM(s)/deciliter  glucagon  Injectable 1 milliGRAM(s) IntraMuscular once PRN Glucose LESS THAN 70 milligrams/deciliter      Allergies    No Known Allergies    Intolerances        SOCIAL HISTORY:    FAMILY HISTORY:  No pertinent family history in first degree relatives      Vital Signs Last 24 Hrs  T(C): 36.6 (14 Apr 2019 08:32), Max: 36.9 (13 Apr 2019 23:16)  T(F): 97.9 (14 Apr 2019 08:32), Max: 98.5 (13 Apr 2019 23:16)  HR: 80 (14 Apr 2019 08:32) (80 - 98)  BP: 127/73 (14 Apr 2019 08:32) (115/72 - 147/83)  BP(mean): --  RR: 20 (14 Apr 2019 08:32) (16 - 20)  SpO2: 97% (14 Apr 2019 08:32) (96% - 99%)    PHYSICAL EXAM:  Constitutional:Well-developed, well nourished  Eyes:ODESSA, EOMI  Ear/Nose/Throat: no oral lesion, no sinus tenderness on percussion	  Neck:no JVD, no lymphadenopathy, supple  Respiratory: b/l wheezes audible without stethoscope   Cardiovascular: S1S2 RRR, no murmurs  Gastrointestinal:soft, (+) BS, no HSM  Extremities:no e/e/c  Vascular: DP Pulse:	right normal; left normal            LABS:                        12.8   23.37 )-----------( 138      ( 14 Apr 2019 07:28 )             40.3     04-14    145  |  113<H>  |  28<H>  ----------------------------<  122<H>  3.9   |  21<L>  |  0.65    Ca    8.4      14 Apr 2019 07:28  Phos  2.9     04-14  Mg     2.2     04-14    TPro  6.0  /  Alb  2.9<L>  /  TBili  0.8  /  DBili  x   /  AST  273<H>  /  ALT  71<H>  /  AlkPhos  83  04-14    PT/INR - ( 13 Apr 2019 10:49 )   PT: 15.4 sec;   INR: 1.35          PTT - ( 13 Apr 2019 10:49 )  PTT:31.1 sec  Urinalysis Basic - ( 13 Apr 2019 12:43 )    Color: Yellow / Appearance: Clear / SG: >=1.030 / pH: x  Gluc: x / Ketone: Trace mg/dL  / Bili: Negative / Urobili: 0.2 E.U./dL   Blood: x / Protein: 100 mg/dL / Nitrite: NEGATIVE   Leuk Esterase: NEGATIVE / RBC: Many /HPF / WBC < 5 /HPF   Sq Epi: x / Non Sq Epi: 0-5 /HPF / Bacteria: Present /HPF        MICROBIOLOGY: Culture - Blood (04.13.19 @ 20:46)    Gram Stain:   Aerobic and Anaerobic Bottle: Gram Positive Cocci in Clusters  Result called to and read back by_ Ms. BELLO Hurtado RN  04/14/2019 08:35:39    Specimen Source: .Blood Blood    Culture Results:   Culture in progress    Culture - Blood (04.13.19 @ 20:46)    -  Staphylococcus aureus: Detec Any isolate of Staphylococcus aureus from a blood culture is NOT considered a contaminant.    Gram Stain:   Aerobic and Anaerobic Bottle: Gram Positive Cocci in Clusters  Result called to and read back by_ Ms. BELLO Hurtado RN  04/14/2019 08:35:39  ***Blood Panel PCR results on this specimen are available  approximately 3 hours after the Gram stain result.***  Gram stain, PCR, and/or culture results may not always  correspond due to difference in methodologies.  ************************************************************  This PCR assay was performed using [facility].  The following targets are tested for: Enterococcus,  vancomycin resistant enterococci, Listeria monocytogenes,  coagulase negative staphylococci, S. aureus,  methicillin resistant S. aureus, Streptococcus agalactiae  (Group B), S. pneumoniae, S. pyogenes (Group A),  Acinetobacter baumannii, Enterobacter cloacae, E. coli,  Klebsiella oxytoca, K. pneumoniae, Proteus sp.,  Serratia marcescens, Haemophilus influenzae,  Neisseria meningitidis, Pseudomonas aeruginosa, Candida  albicans, C. glabrata, C krusei, C parapsilosis,  C. tropicalis and the KPC resistance gene.  "Due to technical problems, Proteus sp. will Not be reported as part of  the BCID panel until further notice"    Specimen Source: .Blood Blood    Organism: Blood Culture PCR    Culture Results:   Culture in progress    Organism Identification: Blood Culture PCR    Method Type: PCR        RADIOLOGY & ADDITIONAL STUDIES: < from: MR Lumbar Spine w/ IV Cont (04.13.19 @ 19:01) >  FINDINGS:  Abnormal signal at T12-L1 disc with fluid expanding the disc space (i.e.   lining of the disc space, especially anteriorly). No associated   significant endplate erosion or marrow edema. Findings are again   concerning for discitis at T12-L1 (without significant osteomyelitis at   this time). However, due to atypical MRI presentation of this process   (primarily, lack of endplate erosive changes and marrow edema), this   finding could alternatively represent traumatic disc distraction or,   least likely, sequela of chronic degenerative changes. Correlation with   recent history of trauma to the thoracolumbar junction and also prior   imaging (if available on-site or at outside institutions) would be   helpful. No epidural abscess or paraspinal phlegmon.  Redemonstration of advanced multilevel spondylosis and multilevel   posterior fusion. No hardware failure. No acute fracture or dislocation.   Canal stenosis is moderate at L5-S1. Otherwise, no highgrade canal   stenosis. Moderate to severe multilevel bilateral foraminal narrowing   also redemonstrated. Please see subsequent final report for details.    < end of copied text >  < from: CT Abdomen and Pelvis w/ Oral Cont and w/ IV Cont (04.14.19 @ 15:07) >    Findings: Bilateralpleural effusions with adjacent pulmonary   consolidations are present.     No focal hepatic or splenic lesion is identified. There is no biliary   ductal dilatation. No radiopaque gallstone is present. Evaluation of the   pancreatic head is slightlylimited by motion. There is a fat containing   lesion near the pancreatic tail, unchanged.    No adrenal mass is present. Nonobstructing renal calculi are present.   There is no hydronephrosis. Mild left-sided perinephric stranding is   present.    There is no evidence of small bowel obstruction. A normal caliber   air-filled appendix is identified. Present in the colon. Sigmoid is   underdistended limiting evaluation for the presence of wall thickening.    The bladder contains a Musa catheter and contrast. It is decompressed.   The prostate appears enlarged. No free fluid is present in the pelvis.    Dense atherosclerotic calcifications are noted.    There is intermediate density anterior to the right inguinal canal which   may be from previous herniorrhaphy. This is unchanged.    Extensive calcification is seen near each hip with effusions. This is   compatible with chondrocalcinosis. The iliopsoas bursae are mildly   distended. These findings are without significant change. Patient is  status post lumbar laminectomy and fusion of L4-S1. Bulky productive   changes are present throughout the spine. Productive and cystic changes   are present at T11-T12 without associated erosion or paraspinal   collection.    < end of copied text >

## 2019-04-14 NOTE — H&P ADULT - ASSESSMENT
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), pyogenic granuloma of eyelid s/p surgery, dementia presenting for b/l LE weakness and back pain in setting of altered mental status. 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 Patient admitted to [facility] for metabolic encephalopathy, Rhabdomyolysis and severe sepsis- concerning for discitis.

## 2019-04-14 NOTE — H&P ADULT - HISTORY OF PRESENT ILLNESS
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 Patient initially unsure why he was brought into ED but upon asking about symptoms of back pain and LE weakness- he states over last week developed progressively worsening lower back pain, no radiation of pain. Patient states he has had hx of LBP for which has had surgery in the past (confirmed on imaging that has prior posterior spinal fusion from L4 to S1 and prior L2-S1 laminectomy.). Reports also progressively worsening weakness for which he was unable to get out of bed today.     Upon arrival to ED, VS /71, HR 88, RR 17, T 98.4F -> later spiked to 102F rectal, O2 95% on RA. Labs notable for WBC 26.35, /ALT 47, initial TBili 1.6 with Alk Phos 182. Cr at 0.92, Lactate at 4.9. CK at 7744. Noted to be tachypneic for which placed on HFNC. CXR with questionable LLL infiltrate. CT lumbar spine with finding concerning for early discitis and/or osteomyelitis at T12-L1 level with no definitive epidural abscess. ICU Consulted for altered mental status and weakness. Ortho consulted in setting of finding concerning for discitis. MRI performed with low suspicion for discitis/osteo. Therefore per ortho no intervention. 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 Patient initially unsure why he was brought into ED but upon asking about symptoms of back pain and LE weakness- he states over last week developed progressively worsening lower back pain, no radiation of pain. Patient states he has had hx of LBP for which has had surgery in the past (confirmed on imaging that has prior posterior spinal fusion from L4 to S1 and prior L2-S1 laminectomy.). Reports also progressively worsening weakness for which he was unable to get out of bed today.     Upon arrival to ED, VS /71, HR 88, RR 17, T 98.4F -> later spiked to 102F rectal, O2 95% on RA. Labs notable for WBC 26.35, /ALT 47, initial TBili 1.6 with Alk Phos 182. Cr at 0.92, Lactate at 4.9. CK at 7744. Noted to be tachypneic for which placed on HFNC. CXR with questionable LLL infiltrate. CT lumbar spine with finding concerning for early discitis and/or osteomyelitis at T12-L1 level with no definitive epidural abscess. ICU Consulted for altered mental status and weakness. Ortho consulted in setting of finding concerning for discitis. MRI performed with low suspicion for discitis/osteo. Therefore per ortho no intervention.   Patient reports that pain located in lower back above sacrum, no radiation of pain. Says he has had abdominal pain that he states sometimes is confused with his back pain. No changes in bowel habits. No dysuria- + incontinence. Denies recent fevers chills. No trauma. 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 Patient initially unsure why he was brought into ED but upon asking about symptoms of back pain and LE weakness- he states over last week developed progressively worsening lower back pain, no radiation of pain. Patient states he has had hx of LBP for which has had surgery in the past (confirmed on imaging that has prior posterior spinal fusion from L4 to S1 and prior L2-S1 laminectomy.). Reports also progressively worsening weakness for which he was unable to get out of bed today.     Upon arrival to ED, VS /71, HR 88, RR 17, T 98.4F -> later spiked to 102F rectal, O2 95% on RA. Labs notable for WBC 26.35, /ALT 47, initial TBili 1.6 with Alk Phos 182. Cr at 0.92, Lactate at 4.9. CK at 7744. Noted to be tachypneic for which placed on HFNC. CXR with questionable LLL infiltrate. CT lumbar spine with finding concerning for early discitis and/or osteomyelitis at T12-L1 level with no definitive epidural abscess. ICU Consulted for altered mental status and weakness. Ortho consulted in setting of finding concerning for discitis. MRI performed-initially with low suspicion for discitis/osteo. Therefore per ortho no intervention. Upon re-evaluation MRI demonstrated abnormal signal at T12-L1 disc with fluid expanding the disc space (i.e. lining of the disc space, especially anteriorly). No associated significant endplate erosion or marrow edema. Findings are again concerning for discitis at T12-L1 (without significant osteomyelitis at this time). Ortho reconsulted for which plan for IR biopsy but no other intervention at this time.  Patient reports that pain located in lower back above sacrum, no radiation of pain. Says he has had abdominal pain that he states sometimes is confused with his back pain. No changes in bowel habits. No dysuria- + incontinence. Denies recent fevers chills. No trauma.

## 2019-04-14 NOTE — H&P ADULT - PROBLEM SELECTOR PLAN 1
P/w Severe sepsis, T 102F, Leukocytosis 26, reported tachypnea. HR 90s. Lactate elevation to 4.9.  -F/u BCx  -C/w Vanc/Zosyn  -Repeat Lactate in AM  -S/p fluid resuscitation- 30cc/kg 2L bolus + maintence for >2.3L P/w Severe sepsis, T 102F, Leukocytosis 26, reported tachypnea. HR 90s. Lactate elevation to 4.9. No clear source, there may be a LLL infiltrate but denies recent cough, was requiring HFNC on presentation. + abdominal pain in setting of transaminitis, neg murphys.   -F/u BCx  -C/w Vanc/Zosyn  -Repeat Lactate in AM  -S/p fluid resuscitation- 30cc/kg 2L bolus + maintenance for >2.3L P/w Severe sepsis, T 102F, Leukocytosis 26, reported tachypnea. HR 90s. Lactate elevation to 4.9-> 3.2 -> 1.8. No clear source, there may be a LLL infiltrate but denies recent cough, was requiring HFNC on presentation. + abdominal pain in setting of transaminitis (but in setting of rhabdo likely etiology) and tender to palpation with tympanic to percussion.   -F/u BCx  -C/w Ceftriaxone and add flagyl for anaerobic coverage  -CT A/P  -S/p fluid resuscitation- 30cc/kg 2L bolus + maintenance for >2.3L P/w Severe sepsis, T 102F, Leukocytosis 26, reported tachypnea. HR 90s. Lactate elevation to 4.9-> 3.2 -> 1.8. MRI findings and CT finding concerning for discitis  -F/u BCx  -C/w Ceftriaxone and Vancomycin  -IR biopsy   -F/u Ortho spine, Obtain Neuro consult in AM  -S/p fluid resuscitation- 30cc/kg 2L bolus + maintenance for >2.3L P/w Severe sepsis, T 102F, Leukocytosis 26, reported tachypnea. HR 90s. Lactate elevation to 4.9-> 3.2 -> 1.8. MRI findings and CT finding concerning for discitis. CRP 20. ESR 30.   -F/u BCx  -C/w Ceftriaxone and Vancomycin  -IR biopsy   -F/u Ortho spine, Obtain Neuro consult in AM  -S/p fluid resuscitation- 30cc/kg 2L bolus + maintenance for >2.3L P/w Severe sepsis, T 102F, Leukocytosis 26, reported tachypnea. HR 90s. Lactate elevation to 4.9-> 3.2 -> 1.8. MRI findings and CT finding concerning for discitis. CRP 20. ESR 30. Given abdominal pain, will obtain CT A/P.  -F/u BCx  -C/w Cefepime (Pending ID approval) and Vancomycin  -IR biopsy   -F/u Ortho spine, Obtain Neuro consult in AM  -S/p fluid resuscitation- 30cc/kg 2L bolus + maintenance for >2.3L P/w Severe sepsis, T 102F, Leukocytosis 26, reported tachypnea. HR 90s. Lactate elevation to 4.9-> 3.2 -> 1.8. MRI findings and CT finding concerning for discitis. CRP 20. ESR 30. Given abdominal pain, will obtain CT A/P.  MRI demonstrated abnormal signal at T12-L1 disc with fluid expanding the disc space (i.e. lining of the disc space, especially anteriorly). No associated significant endplate erosion or marrow edema. Findings are again concerning for discitis at T12-L1 (without significant osteomyelitis at this time).   -F/u BCx  -C/w Cefepime (Pending ID approval) and Vancomycin, ID consult in AM  -IR biopsy   -F/u Ortho spine, Obtain Neuro consult in AM  -ID consult in AM  -S/p fluid resuscitation- 30cc/kg 2L bolus + maintenance for >2.3L

## 2019-04-14 NOTE — H&P ADULT - PROBLEM SELECTOR PLAN 2
Likely secondary to severe sepsis. With resuscitation patient able to better respond to questions. Likely secondary to severe sepsis. With resuscitation patient able to better respond to questions.  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   -Vitamin deficiencies: obtain  B12, folate level Likely secondary to severe sepsis. With resuscitation patient able to better respond to questions.  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   -Vitamin deficiencies: obtain  B12, folate level Likely secondary to severe sepsis. With resuscitation patient able to better respond to questions.  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   -Vitamin deficiencies: obtain  B12, folate level Most likely etiology secondary to ongoing infection- considering patient came in with severe sepsis. With resuscitation patient able to better respond to questions.  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   -Vitamin deficiencies: obtain  B12, folate level

## 2019-04-14 NOTE — H&P ADULT - PROBLEM SELECTOR PLAN 5
Noted back pain on presentation. Initial CT concerning for findings of developing osteo or discitis. MRI with lower suspicion for epidural abscess or discitis. In setting of hx of posterior spinal fusion from L4 to S1 and prior L2-S1 laminectomy, may be residual from past surgery vs potential infection given fever/septic on presentation  -f/u BCx  -If no improvement may need to consider IR biopsy to r/o discitis Noted back pain on presentation. Initial CT concerning for findings of developing osteo or discitis. MRI with lower suspicion for epidural abscess or discitis. In setting of hx of posterior spinal fusion from L4 to S1 and prior L2-S1 laminectomy, may be residual from past surgery vs potential infection given fever/septic on presentation  -f/u BCx  -Obtain IR biopsy to r/o discitis Noted back pain on presentation. Initial CT concerning for findings of developing osteo or discitis. MRI with lower suspicion for epidural abscess or discitis. In setting of hx of posterior spinal fusion from L4 to S1 and prior L2-S1 laminectomy, may be residual from past surgery vs potential infection given fever/septic on presentation  -f/u BCx  -Obtain IR biopsy to r/o discitis  -F/u Ortho-spine recs  -Neuro consult, f/u recs

## 2019-04-14 NOTE — PROGRESS NOTE ADULT - SUBJECTIVE AND OBJECTIVE BOX
pt currently alert and oriented x 3. reports LE weakness otherwise denies symptoms    [  X] ROS otherwise negative      Vital Signs Last 24 Hrs Vital Signs Last 24 Hrs  T(C): 37.3 (14 Apr 2019 16:31), Max: 37.3 (14 Apr 2019 16:31)  T(F): 99.1 (14 Apr 2019 16:31), Max: 99.1 (14 Apr 2019 16:31)  HR: 92 (14 Apr 2019 16:58) (80 - 98)  BP: 151/84 (14 Apr 2019 16:31) (125/59 - 151/84)  BP(mean): --  RR: 19 (14 Apr 2019 16:58) (16 - 20)  SpO2: 93% (14 Apr 2019 16:58) (92% - 98%)    I&O's Detail    13 Apr 2019 07:01  -  14 Apr 2019 07:00  --------------------------------------------------------    General: [X  ]Well Appearing   [  ]Ill Appearing [  ]Lethargic [  ]Agitated [  ]Pale [  ]Cachectic  HEENT: [X  ]Nonicteric [  ]ODESSA [  ]Other:  CV: [ X ] RRR   Lungs: [X  ]CTAB  [  ]Other:  ABD: [X  ]Soft  [ X ]+Bowel Sounds  [ X ] Nontender  [ X ]NonDistended  [  ] No HSM   [  ] Other  Ext: [ X ]2+Pulses  [  ]No C/C/E  [  ]Edema:              [  ] Other:  neuro- oriented x 3, LE- 3/5  UE-5/5          RADIOLOGY & ADDITIONAL STUDIES:

## 2019-04-14 NOTE — PROGRESS NOTE ADULT - ASSESSMENT
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 Patient admitted to [facility] for metabolic encephalopathy, Rhabdomyolysis and severe sepsis- concerning for discitis.

## 2019-04-14 NOTE — H&P ADULT - PROBLEM SELECTOR PLAN 9
F: IVF s/p 2L bolus + maintenance at 125cc/hr  E: Replete electrolytes to maintain K>4 and Mg>2  N:   Diet as tolerated F: IVF s/p 2L bolus + maintenance at 125cc/hr  E: Replete electrolytes to maintain K>4 and Mg>2  N: DASH/TLC  Diet as tolerated F: IVF s/p 2L bolus + maintenance at 125cc/hr  E: Replete electrolytes to maintain K>4 and Mg>2  N: NPO for potential IR biopsy

## 2019-04-14 NOTE — H&P ADULT - NSHPPHYSICALEXAM_GEN_ALL_CORE
General: Lying in bed comfortably, in NAD  HEENT: Anicteric sclera, No pallor noted. PERRL. EOMI. Oral mucosa moist. No tonsillar exudates noted.  Neck: No distended neck veins, No lymphadenopathy appreciated  Resp: Clear to auscultation bilaterally  Cardiac: S1, S2 appreciated, No murmurs, rubs or gallops. Regular Rate and Rhythm.  GI: Soft, nontender, nondistended with + Bowel sounds  Neuro: AAOx3, following commands, 5/5 strength in upper and lower extremities  Extremities: No edema noted, 2+ pedal and radial pulses bilaterally General: Lying in bed comfortably, in NAD  HEENT: Anicteric sclera, No pallor noted. PERRL. EOMI. Oral mucosa moist. No tonsillar exudates noted.  Neck: No distended neck veins, No lymphadenopathy appreciated  Resp: Clear to auscultation bilaterally  Cardiac: S1, S2 appreciated, No murmurs, rubs or gallops. Regular Rate and Rhythm.  GI: Soft, nontender, nondistended with + Bowel sounds  Neuro: AAOx , following commands, 5/5 strength in upper and lower extremities  Extremities: No edema noted, DP intact. General: Lying in bed comfortably, in NAD  HEENT: Anicteric sclera, No pallor noted. PERRL. EOMI. Oral mucosa moist. No tonsillar exudates noted.  Neck: No distended neck veins.  Resp: Clear to auscultation bilaterally  Cardiac: S1, S2 appreciated, No murmurs, rubs or gallops. Regular Rate and Rhythm.  GI: Soft, mild distension with mild tenderness in lower R and left lower quadrants. + Bowel sounds. Tympanic to percussion.  Neuro: AAOx2(Person, place) , following commands, 5/5 strength in upper and lower extremities. 0+patellar reflex.  Extremities: No edema noted, DP intact. General: Lying in bed comfortably, in NAD  HEENT: Anicteric sclera, No pallor noted. PERRL. EOMI. Oral mucosa moist. No tonsillar exudates noted.  Neck: No distended neck veins.  Resp: Clear to auscultation bilaterally  Back: No point tenderness  Cardiac: S1, S2 appreciated, No murmurs, rubs or gallops. Regular Rate and Rhythm.  GI: Soft, mild distension with mild tenderness in lower R and left lower quadrants. + Bowel sounds. Tympanic to percussion.  Neuro: AAOx2(Person, place) , following commands, 5/5 strength in upper and 3/5 lower extremities. 0+patellar reflex.  Extremities: No edema noted, DP intact. General: Lying in bed comfortably, in NAD  HEENT: Anicteric sclera, No pallor noted. PERRL. EOMI. Oral mucosa moist. No tonsillar exudates noted.  Neck: No distended neck veins.  Resp: Clear to auscultation bilaterally  Back: No point tenderness  Cardiac: S1, S2 appreciated, No murmurs, rubs or gallops. Regular Rate and Rhythm.  GI: Soft, mild distension with mild tenderness in lower R and left lower quadrants. + Bowel sounds. Tympanic to percussion.  Neuro: AAOx2(Person, place) , following commands, 5/5 strength in upper and 3/5 lower extremities. 0+patellar reflex.  Extremities: No edema noted, DP intact.  Psych: normal affect  Skin: no  ulcers, midline scar on lumbar spine region General: Lying in bed comfortably, in NAD  HEENT: Anicteric sclera, No pallor noted. PERRL. EOMI. Oral mucosa moist. No tonsillar exudates noted.  Neck: No distended neck veins.  Resp: Clear to auscultation bilaterally  Back: No point tenderness  Cardiac: S1, S2 appreciated, No murmurs, rubs or gallops. Regular Rate and Rhythm.  GI: Soft, mild distension with mild tenderness in lower R and left lower quadrants. + Bowel sounds. Tympanic to percussion.  Neuro: AAOx2(Person, place), following commands, No facial droop. No dysarthria, no aphasia.  Pupils equally round and reactive to light, visual fields full, no nystagmus, extraocular muscles intact, V1 through V3 intact bilaterally and symmetric, no facial droop, palate elevation symmetric, tongue was midline,5/5 strength in upper and 3/5 lower extremities. 0+patellar reflex. 1+ bicep and brachioradialis b/l. Sensation intact.   Extremities: No edema noted, DP intact.  Psych: normal affect  Skin: no  ulcers, midline scar on lumbar spine region

## 2019-04-14 NOTE — H&P ADULT - NSICDXPASTMEDICALHX_GEN_ALL_CORE_FT
PAST MEDICAL HISTORY:  Essential hypertension     HLD (hyperlipidemia)     RBBB (right bundle branch block with left anterior fascicular block)

## 2019-04-14 NOTE — CONSULT NOTE ADULT - ASSESSMENT
89 yo male with PPM, L4-S1 PSF, L2-S1 laminectomy > 10 years ago, p/w LBP, found to have T12-L1 discitis and MSSA BSI.   - surveillance bcx  - TTE  - given positive bcx with Staph aureus, would NOT perform spine biopsy (i.e. Staph aureus is the causative organism of discitis (i.e. a diagnosis has been established)); there is no SEA or other abscess seen on MRI warranting a drainage procedure  - d/c vancomycin and cefepime and start cefazolin 2g IV q8h; to f/u susceptibilities--will add adjuvant rifampin for biofilm penetration given hardware if isolate susceptible     ID Team 1 will follow

## 2019-04-14 NOTE — H&P ADULT - PROBLEM SELECTOR PLAN 3
CK at 3768. Has not been walking much per patient. CK at 7744.  -c/w IVF at 125cc/hr, unknown EF, will continue to monitor fluid status  -repeat CK in AM

## 2019-04-14 NOTE — H&P ADULT - PROBLEM SELECTOR PLAN 6
Hx of HTN. Hx of HTN on amlodipine and losartan  -c/w home amlodipine and losartan Hx of HTN on amlodipine and losartan  -Hold in setting of sepsis

## 2019-04-14 NOTE — H&P ADULT - PROBLEM SELECTOR PLAN 10
VTE: HSQ    DNR/DNI    Dispo: Admit to [facility] for severe sepsis of unclear etiology in setting of rhabdomyolysis VTE: HSQ    DNR/DNI (MOLST in chart)    Dispo: Admit to [facility] for severe sepsis of unclear etiology in setting of rhabdomyolysis VTE: HSQ    DNR/DNI (MOLST in chart)    Dispo: Admit to [facility] for severe sepsis for Discitis, rhabdomyolysis VTE: No HSQ pending potential biopsy    DNR/DNI (MOLST in chart)    Dispo: Admit to [facility] for severe sepsis for Discitis, rhabdomyolysis

## 2019-04-14 NOTE — H&P ADULT - PROBLEM SELECTOR PLAN 7
Hx of ESAU Hx of HLD on home lipitor  -c/w lipitor Hx of HLD on home Lipitor  -Hold lipitor in setting of rhabdo

## 2019-04-14 NOTE — H&P ADULT - NSHPLABSRESULTS_GEN_ALL_CORE
LABS:                        15.0   26.35 )-----------( 183      ( 13 Apr 2019 10:49 )             45.7     04-13    142  |  111<H>  |  29<H>  ----------------------------<  127<H>  4.0   |  20<L>  |  0.72    Ca    8.5      13 Apr 2019 22:41    TPro  5.9<L>  /  Alb  3.1<L>  /  TBili  0.9  /  DBili  x   /  AST  238<H>  /  ALT  60<H>  /  AlkPhos  79  04-13    PT/INR - ( 13 Apr 2019 10:49 )   PT: 15.4 sec;   INR: 1.35          PTT - ( 13 Apr 2019 10:49 )  PTT:31.1 sec  Urinalysis Basic - ( 13 Apr 2019 12:43 )    Color: Yellow / Appearance: Clear / SG: >=1.030 / pH: x  Gluc: x / Ketone: Trace mg/dL  / Bili: Negative / Urobili: 0.2 E.U./dL   Blood: x / Protein: 100 mg/dL / Nitrite: NEGATIVE   Leuk Esterase: NEGATIVE / RBC: Many /HPF / WBC < 5 /HPF   Sq Epi: x / Non Sq Epi: 0-5 /HPF / Bacteria: Present /HPF

## 2019-04-14 NOTE — CONSULT NOTE ADULT - ASSESSMENT
Diffuse weakness, AMS, fever in the setting of sepsis and rhabdomyolysis, now improved with full strength. No cauda equina compression on MRI L Spine. Most likely due to sepsis with encephalopathy + rhabdomylosis, possible discitis / osteomyelitis although no evidence of cord or cauda equina compression. Clinically improved  after starting antibiotics.     -	Follow-up with IR biopsy as recommended by orthopedics  -	Consider MRI cervical and thoracic spine with contrast to rule out additional potential sites of discitis, osteomyelitis; low concern for epidural abscess given unremarkable neurologic exam   -	Rhabdomyolysis possibly due to unwitnessed fall (although pt’s friend reports no known recent falls or trauma). Pt has been on long-standing statin so drug-induced rhabdomyolysis is less likely. Inflammatory myopathy also less likely given good muscle strength on exam today and lack of other findings, i.e. rash, muscle pain  -	Trend CK, if resolves with time no further workup. Otherwise will consider myopathy workup.   Neurology will continue to follow.

## 2019-04-14 NOTE — CONSULT NOTE ADULT - SUBJECTIVE AND OBJECTIVE BOX
Neurology Consult Note    HPI: 89 yo M admitted with weakness, fever, altered mental status. He has a history of sick sinus syndrome s/p PPM, dementia (on galantamine), LBP s/p posterior spinal fusion L4-S1 and L2-S1 laminectomy 10 years ago, HTN, glaucoma in both eyes s/p multiple surgeries at [facility], pyogenic granuloma of eyelid. Pt is a poor historian so most of the history was obtained from pt’s friend (Mir Mitchell). 1 day prior to presentation, pt went to a [employer] concert with his friend and upon exiting the venue, pt felt weak and had difficulty standing and walking (pt can usually walk many blocks with a cane). As the day progressed, pt seemed more confused than usual, had decreased PO intake, and had some trouble urinating (per friend, pt has urinary incontinence at baseline but stream seemed weaker; he was "urinating sloppily"). The next morning, pt was confused with generalized weakness and trouble getting out of bed so pt’s friend brought him to the ED for further evaluation. In the ED, pt’s friend reports pt started complaining of low back pain, which pt says he hasn’t had since his spinal surgeries over 10 years ago. Pt himself reports the pain was 8/10 on presentation; the pain was worse with movement, but improved significantly with Tylenol. Pt’s friend says pt has not had any falls recently. Pt reports he sometimes gets a little dizzy and SOB while walking, but he otherwise has had no other symptoms, including fever/chills, headache, vision changes, difficulty breathing, cough, nausea/vomiting, abdominal pain, and changes in bowel movements (friend says pt does not appear to be rectally incontinent at baseline). No history of prior CVA, but CT head in the ED was notable for chronic microangioapthic disease including bilateral lacunar infarcts.     In the ED, patient was started on cefepime and vancomycin for sepsis (fever, tachypnea, elevated WBC and lactate) in the setting of possible discitis found on CT/MRI lumbar spine. CT lumbar spine with contrast was concerning for early discitis vs osteomyelitis at T12-L1 level. MRI lumbar spine with contrast showed abnormal signal at T12-L1 disc with disc space expansion, concerning for discitis. Blood cultures were positive for Staph aureus. Pt also found to have rhabdomyolysis with elevated CK and myoglobinuria.     Neurology was consulted for weakness. Upon interview today, pt reports he currently has no back pain, but continues to feel weak and is unable to stand currently.    PAST MEDICAL & SURGICAL HISTORY:  RBBB (right bundle branch block with left anterior fascicular block)  HLD (hyperlipidemia)  Essential hypertension  S/P lumbar spinal fusion  H/O laminectomy    SOCIAL HISTORY: Per friend, pt is a former smoker, but quit 40 years ago. No EtOH or illicit drug use.     ROS: as above otherwise 10 pt ROS negatve    MEDICATIONS  (STANDING):  cefepime   IVPB 2000 milliGRAM(s) IV Intermittent every 8 hours  dextrose 5%. 1000 milliLiter(s) (50 mL/Hr) IV Continuous <Continuous>  dextrose 50% Injectable 12.5 Gram(s) IV Push once  dextrose 50% Injectable 25 Gram(s) IV Push once  dextrose 50% Injectable 25 Gram(s) IV Push once  insulin lispro (HumaLOG) corrective regimen sliding scale   SubCutaneous Before meals and at bedtime  lactated ringers. 1000 milliLiter(s) (125 mL/Hr) IV Continuous <Continuous>  timolol 0.5% Solution 1 Drop(s) Both EYES every 12 hours  vancomycin  IVPB 1250 milliGRAM(s) IV Intermittent every 12 hours    MEDICATIONS  (PRN):  dextrose 40% Gel 15 Gram(s) Oral once PRN Blood Glucose LESS THAN 70 milliGRAM(s)/deciliter  glucagon  Injectable 1 milliGRAM(s) IntraMuscular once PRN Glucose LESS THAN 70 milligrams/deciliter      Allergies: NKDA    Vital Signs Last 24 Hrs  T(C): 36.6 (14 Apr 2019 08:32), Max: 37.1 (13 Apr 2019 13:46)  T(F): 97.9 (14 Apr 2019 08:32), Max: 98.7 (13 Apr 2019 13:46)  HR: 80 (14 Apr 2019 08:32) (80 - 103)  BP: 127/73 (14 Apr 2019 08:32) (113/53 - 147/83)  BP(mean): --  RR: 20 (14 Apr 2019 08:32) (16 - 20)  SpO2: 97% (14 Apr 2019 08:32) (96% - 99%)    PHYSICAL EXAM:  Constitutional: No acute distress  MSK: Tenderness to palpation at lower thoracic level, lower back.   Neurologic:  Mental status: Awake, alert and oriented x1 (name). Recent memory intact.  Naming, repetition and comprehension intact.  No dysarthria, no aphasia.   Cranial nerves: Pupils equally round and reactive to light, visual fields full, no nystagmus, extraocular muscles intact, V1 through V3 intact bilaterally and symmetric, face symmetric, palate elevation symmetric, tongue was midline.    Motor: Normal bulk and tone, strength full and symmetric in major muscle groups in bilateral upper and lower extremities.  Sensation: Intact and symmetric to light touch and vibration throughout  Coordination: No dysmetria on finger-to-nose.    Reflexes: hyporeflexic upper and lower extremities, downgoing toes bilaterally  Gait: Did not assess    POCT Blood Glucose.: 103 mg/dL (14 Apr 2019 12:20)       LABS:                        12.8   23.37 )-----------( 138      ( 14 Apr 2019 07:28 )             40.3     04-14    145  |  113<H>  |  28<H>  ----------------------------<  122<H>  3.9   |  21<L>  |  0.65    Ca    8.4      14 Apr 2019 07:28  Mg     2.2     04-14    TPro  6.0  /  Alb  2.9<L>  /  TBili  0.8  /  DBili  x   /  AST  273<H>  /  ALT  71<H>  /  AlkPhos  83  04-14    PT/INR - ( 13 Apr 2019 10:49 )   PT: 15.4 sec;   INR: 1.35       Urinalysis Basic - ( 13 Apr 2019 12:43 )    Color: Yellow / Appearance: Clear / SG: >=1.030 / pH: x  Gluc: x / Ketone: Trace mg/dL  / Bili: Negative / Urobili: 0.2 E.U./dL   Blood: x / Protein: 100 mg/dL / Nitrite: NEGATIVE   Leuk Esterase: NEGATIVE / RBC: Many /HPF / WBC < 5 /HPF   Sq Epi: x / Non Sq Epi: 0-5 /HPF / Bacteria: Present /HPF    EXAM:  CT LUMBAR SPINE IC                          PROCEDURE DATE:  04/13/2019      IMPRESSION:   1. Findings concerning for early discitis and/or osteomyelitis at T12-L1   level with no definitive epidural abscess identified albeit evaluation is   limited on CT. MR may be helpful for further characterization if cleared   clinically given presence of pacemaker.   2. Prior posterior spinal fusion from L4 to S1 with no hardware   malfunction and prior L2-S1 laminectomy.   3. 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   4. 1.1cmfat-containing lesion tail of pancreas, unchanged from prior   abdominal CT of 11/20/2017.    EXAM:  MR SPINE LUMBAR IC                          PROCEDURE DATE:  04/13/2019      FINDINGS:  Abnormal signal at T12-L1 disc with fluid expanding the disc space (i.e.   lining of the disc space, especially anteriorly). No associated   significant endplate erosion or marrow edema. Findings are again   concerning for discitis at T12-L1 (without significant osteomyelitis at   this time). However, due to atypical MRI presentation of this process   (primarily, lack of endplate erosive changes and marrow edema), this   finding could alternatively represent traumatic disc distraction or,   least likely, sequela of chronic degenerative changes. Correlation with   recent history of trauma to the thoracolumbar junction and also prior   imaging (if available on-site or at outside institutions) would be   helpful. No epidural abscess or paraspinal phlegmon.  Redemonstration of advanced multilevel spondylosis and multilevel   posterior fusion. No hardware failure. No acute fracture or dislocation.   Canal stenosis is moderate at L5-S1. Otherwise, no highgrade canal   stenosis. Moderate to severe multilevel bilateral foraminal narrowing   also redemonstrated. Please see subsequent final report for details.    IMPRESSION: Abnormal signal at T12-L1 disc with disc space expansion,   without associated endplate erosion or marrow edema. Differential   diagnoses as above.    ASSESSMENT & PLAN:  Diffuse weakness, AMS, fever in the setting of sepsis and rhabdomyolysis, now improved with full strength. No cauda equina compression on MRI L Spine. Most likely due to sepsis with encephalopathy + rhabdomylosis, possible discitis / osteomyelitis although no evidence of cord or cauda equina compression. Clinically improved  after starting antibiotics.     -	Follow-up with IR biopsy as recommended by orthopedics  -	Consider MRI cervical and thoracic spine with contrast to rule out additional sites of discitis, osteomyelitis, and epidural abscess, although no evidence of cord compression on exam  -	Rhabdomyolysis with elevated CK likely secondary to infection vs unwitnessed fall (although pt’s friend reports no recent falls or trauma). Pt has been on long-standing statin so drug-induced rhabdomyolysis is less likely. Inflammatory myopathy also less likely given good muscle strength on exam today and lack of other findings, i.e. rash, muscle pain  -	Trend CK, if resolves with time no further workup. Otherwise will consider myopathy workup.   -	Neurology will continue to follow. Neurology Consult Note    HPI: 89 yo M admitted with weakness, fever, altered mental status. He has a history of sick sinus syndrome s/p PPM, dementia (on galantamine), LBP s/p posterior spinal fusion L4-S1 and L2-S1 laminectomy 10 years ago, HTN, glaucoma in both eyes s/p multiple surgeries at [facility], pyogenic granuloma of eyelid. Pt is a poor historian so most of the history was obtained from pt’s friend (Mir Mitchell). 1 day prior to presentation, pt went to a [employer] concert with his friend and upon exiting the venue, pt felt weak and had difficulty standing and walking (pt can usually walk many blocks with a cane). As the day progressed, pt seemed more confused than usual, had decreased PO intake, and had some trouble urinating (per friend, pt has urinary incontinence at baseline but stream seemed weaker; he was "urinating sloppily"). The next morning, pt was confused with generalized weakness and trouble getting out of bed so pt’s friend brought him to the ED for further evaluation. In the ED, pt’s friend reports pt started complaining of low back pain, which pt says he hasn’t had since his spinal surgeries over 10 years ago. Pt himself reports the pain was 8/10 on presentation; the pain was worse with movement, but improved significantly with Tylenol. Pt’s friend says pt has not had any falls recently. Pt reports he sometimes gets a little dizzy and SOB while walking, but he otherwise has had no other symptoms, including fever/chills, headache, vision changes, difficulty breathing, cough, nausea/vomiting, abdominal pain, and changes in bowel movements (friend says pt does not appear to be rectally incontinent at baseline). No history of prior CVA, but CT head in the ED was notable for chronic microangioapthic disease including bilateral lacunar infarcts.     In the ED, patient was started on cefepime and vancomycin for sepsis (fever, tachypnea, elevated WBC and lactate) in the setting of possible discitis found on CT/MRI lumbar spine. CT lumbar spine with contrast was concerning for early discitis vs osteomyelitis at T12-L1 level. MRI lumbar spine with contrast showed abnormal signal at T12-L1 disc with disc space expansion, concerning for discitis. Blood cultures were positive for Staph aureus. Pt also found to have rhabdomyolysis with elevated CK and myoglobinuria.     Neurology was consulted for weakness. Upon interview today, pt reports he currently has no back pain, but continues to feel weak and is unable to stand currently.    PAST MEDICAL & SURGICAL HISTORY:  RBBB (right bundle branch block with left anterior fascicular block)  HLD (hyperlipidemia)  Essential hypertension  S/P lumbar spinal fusion  H/O laminectomy    SOCIAL HISTORY: Per friend, pt is a former smoker, but quit 40 years ago. No EtOH or illicit drug use.     ROS: as above otherwise 10 pt ROS negatve    MEDICATIONS  (STANDING):  cefepime   IVPB 2000 milliGRAM(s) IV Intermittent every 8 hours  dextrose 5%. 1000 milliLiter(s) (50 mL/Hr) IV Continuous <Continuous>  dextrose 50% Injectable 12.5 Gram(s) IV Push once  dextrose 50% Injectable 25 Gram(s) IV Push once  dextrose 50% Injectable 25 Gram(s) IV Push once  insulin lispro (HumaLOG) corrective regimen sliding scale   SubCutaneous Before meals and at bedtime  lactated ringers. 1000 milliLiter(s) (125 mL/Hr) IV Continuous <Continuous>  timolol 0.5% Solution 1 Drop(s) Both EYES every 12 hours  vancomycin  IVPB 1250 milliGRAM(s) IV Intermittent every 12 hours    MEDICATIONS  (PRN):  dextrose 40% Gel 15 Gram(s) Oral once PRN Blood Glucose LESS THAN 70 milliGRAM(s)/deciliter  glucagon  Injectable 1 milliGRAM(s) IntraMuscular once PRN Glucose LESS THAN 70 milligrams/deciliter      Allergies: NKDA    Vital Signs Last 24 Hrs  T(C): 36.6 (14 Apr 2019 08:32), Max: 37.1 (13 Apr 2019 13:46)  T(F): 97.9 (14 Apr 2019 08:32), Max: 98.7 (13 Apr 2019 13:46)  HR: 80 (14 Apr 2019 08:32) (80 - 103)  BP: 127/73 (14 Apr 2019 08:32) (113/53 - 147/83)  BP(mean): --  RR: 20 (14 Apr 2019 08:32) (16 - 20)  SpO2: 97% (14 Apr 2019 08:32) (96% - 99%)    PHYSICAL EXAM:  Constitutional: No acute distress  MSK: Tenderness to palpation at lower thoracic level, lower back.   Neurologic:  Mental status: Awake, alert and oriented x1 (name). Recent memory intact.  Naming, repetition and comprehension intact.  No dysarthria, no aphasia.   Cranial nerves: Pupils equally round and reactive to light, visual fields full, no nystagmus, extraocular muscles intact, V1 through V3 intact bilaterally and symmetric, face symmetric, palate elevation symmetric, tongue was midline.    Motor: Normal bulk and tone, strength full and symmetric in major muscle groups in bilateral upper and lower extremities.  Sensation: Intact and symmetric to light touch and vibration throughout  Coordination: No dysmetria on finger-to-nose.    Reflexes: hyporeflexic upper and lower extremities, downgoing toes bilaterally  Gait: Did not assess    POCT Blood Glucose.: 103 mg/dL (14 Apr 2019 12:20)       LABS:                        12.8   23.37 )-----------( 138      ( 14 Apr 2019 07:28 )             40.3     04-14    145  |  113<H>  |  28<H>  ----------------------------<  122<H>  3.9   |  21<L>  |  0.65    Ca    8.4      14 Apr 2019 07:28  Mg     2.2     04-14    TPro  6.0  /  Alb  2.9<L>  /  TBili  0.8  /  DBili  x   /  AST  273<H>  /  ALT  71<H>  /  AlkPhos  83  04-14    PT/INR - ( 13 Apr 2019 10:49 )   PT: 15.4 sec;   INR: 1.35       Urinalysis Basic - ( 13 Apr 2019 12:43 )    Color: Yellow / Appearance: Clear / SG: >=1.030 / pH: x  Gluc: x / Ketone: Trace mg/dL  / Bili: Negative / Urobili: 0.2 E.U./dL   Blood: x / Protein: 100 mg/dL / Nitrite: NEGATIVE   Leuk Esterase: NEGATIVE / RBC: Many /HPF / WBC < 5 /HPF   Sq Epi: x / Non Sq Epi: 0-5 /HPF / Bacteria: Present /HPF    EXAM:  CT LUMBAR SPINE IC                          PROCEDURE DATE:  04/13/2019      IMPRESSION:   1. Findings concerning for early discitis and/or osteomyelitis at T12-L1   level with no definitive epidural abscess identified albeit evaluation is   limited on CT. MR may be helpful for further characterization if cleared   clinically given presence of pacemaker.   2. Prior posterior spinal fusion from L4 to S1 with no hardware   malfunction and prior L2-S1 laminectomy.   3. 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   4. 1.1cmfat-containing lesion tail of pancreas, unchanged from prior   abdominal CT of 11/20/2017.    EXAM:  MR SPINE LUMBAR IC                          PROCEDURE DATE:  04/13/2019      FINDINGS:  Abnormal signal at T12-L1 disc with fluid expanding the disc space (i.e.   lining of the disc space, especially anteriorly). No associated   significant endplate erosion or marrow edema. Findings are again   concerning for discitis at T12-L1 (without significant osteomyelitis at   this time). However, due to atypical MRI presentation of this process   (primarily, lack of endplate erosive changes and marrow edema), this   finding could alternatively represent traumatic disc distraction or,   least likely, sequela of chronic degenerative changes. Correlation with   recent history of trauma to the thoracolumbar junction and also prior   imaging (if available on-site or at outside institutions) would be   helpful. No epidural abscess or paraspinal phlegmon.  Redemonstration of advanced multilevel spondylosis and multilevel   posterior fusion. No hardware failure. No acute fracture or dislocation.   Canal stenosis is moderate at L5-S1. Otherwise, no highgrade canal   stenosis. Moderate to severe multilevel bilateral foraminal narrowing   also redemonstrated. Please see subsequent final report for details.    IMPRESSION: Abnormal signal at T12-L1 disc with disc space expansion,   without associated endplate erosion or marrow edema. Differential   diagnoses as above.

## 2019-04-15 LAB
ANION GAP SERPL CALC-SCNC: 11 MMOL/L — SIGNIFICANT CHANGE UP (ref 5–17)
BUN SERPL-MCNC: 24 MG/DL — HIGH (ref 7–23)
CALCIUM SERPL-MCNC: 8.6 MG/DL — SIGNIFICANT CHANGE UP (ref 8.4–10.5)
CHLORIDE SERPL-SCNC: 110 MMOL/L — HIGH (ref 96–108)
CO2 SERPL-SCNC: 22 MMOL/L — SIGNIFICANT CHANGE UP (ref 22–31)
CREAT SERPL-MCNC: 0.59 MG/DL — SIGNIFICANT CHANGE UP (ref 0.5–1.3)
GLUCOSE BLDC GLUCOMTR-MCNC: 131 MG/DL — HIGH (ref 70–99)
GLUCOSE BLDC GLUCOMTR-MCNC: 134 MG/DL — HIGH (ref 70–99)
GLUCOSE BLDC GLUCOMTR-MCNC: 148 MG/DL — HIGH (ref 70–99)
GLUCOSE BLDC GLUCOMTR-MCNC: 172 MG/DL — HIGH (ref 70–99)
GLUCOSE BLDC GLUCOMTR-MCNC: 192 MG/DL — HIGH (ref 70–99)
GLUCOSE SERPL-MCNC: 123 MG/DL — HIGH (ref 70–99)
HCT VFR BLD CALC: 37.4 % — LOW (ref 39–50)
HGB BLD-MCNC: 12.4 G/DL — LOW (ref 13–17)
MAGNESIUM SERPL-MCNC: 2.3 MG/DL — SIGNIFICANT CHANGE UP (ref 1.6–2.6)
MCHC RBC-ENTMCNC: 30.5 PG — SIGNIFICANT CHANGE UP (ref 27–34)
MCHC RBC-ENTMCNC: 33.2 GM/DL — SIGNIFICANT CHANGE UP (ref 32–36)
MCV RBC AUTO: 91.9 FL — SIGNIFICANT CHANGE UP (ref 80–100)
NRBC # BLD: 0 /100 WBCS — SIGNIFICANT CHANGE UP (ref 0–0)
PLATELET # BLD AUTO: 131 K/UL — LOW (ref 150–400)
POTASSIUM SERPL-MCNC: 3.4 MMOL/L — LOW (ref 3.5–5.3)
POTASSIUM SERPL-SCNC: 3.4 MMOL/L — LOW (ref 3.5–5.3)
RBC # BLD: 4.07 M/UL — LOW (ref 4.2–5.8)
RBC # FLD: 14.4 % — SIGNIFICANT CHANGE UP (ref 10.3–14.5)
SODIUM SERPL-SCNC: 143 MMOL/L — SIGNIFICANT CHANGE UP (ref 135–145)
WBC # BLD: 19.19 K/UL — HIGH (ref 3.8–10.5)
WBC # FLD AUTO: 19.19 K/UL — HIGH (ref 3.8–10.5)

## 2019-04-15 PROCEDURE — 93306 TTE W/DOPPLER COMPLETE: CPT | Mod: 26

## 2019-04-15 PROCEDURE — 99233 SBSQ HOSP IP/OBS HIGH 50: CPT

## 2019-04-15 PROCEDURE — 99233 SBSQ HOSP IP/OBS HIGH 50: CPT | Mod: GC

## 2019-04-15 PROCEDURE — 99232 SBSQ HOSP IP/OBS MODERATE 35: CPT | Mod: GC

## 2019-04-15 RX ORDER — IPRATROPIUM/ALBUTEROL SULFATE 18-103MCG
3 AEROSOL WITH ADAPTER (GRAM) INHALATION EVERY 6 HOURS
Qty: 0 | Refills: 0 | Status: DISCONTINUED | OUTPATIENT
Start: 2019-04-15 | End: 2019-04-16

## 2019-04-15 RX ORDER — POTASSIUM CHLORIDE 20 MEQ
40 PACKET (EA) ORAL ONCE
Qty: 0 | Refills: 0 | Status: COMPLETED | OUTPATIENT
Start: 2019-04-15 | End: 2019-04-15

## 2019-04-15 RX ORDER — IPRATROPIUM/ALBUTEROL SULFATE 18-103MCG
3 AEROSOL WITH ADAPTER (GRAM) INHALATION ONCE
Qty: 0 | Refills: 0 | Status: COMPLETED | OUTPATIENT
Start: 2019-04-15 | End: 2019-04-15

## 2019-04-15 RX ADMIN — Medication 1 DROP(S): at 18:59

## 2019-04-15 RX ADMIN — HEPARIN SODIUM 5000 UNIT(S): 5000 INJECTION INTRAVENOUS; SUBCUTANEOUS at 14:00

## 2019-04-15 RX ADMIN — Medication 1: at 22:25

## 2019-04-15 RX ADMIN — Medication 3 MILLILITER(S): at 14:00

## 2019-04-15 RX ADMIN — Medication 2000 MILLIGRAM(S): at 09:02

## 2019-04-15 RX ADMIN — Medication 2000 MILLIGRAM(S): at 18:59

## 2019-04-15 RX ADMIN — Medication 1 DROP(S): at 06:15

## 2019-04-15 RX ADMIN — Medication 40 MILLIEQUIVALENT(S): at 09:09

## 2019-04-15 RX ADMIN — Medication 3 MILLILITER(S): at 02:16

## 2019-04-15 RX ADMIN — Medication 2000 MILLIGRAM(S): at 01:56

## 2019-04-15 RX ADMIN — Medication 3 MILLILITER(S): at 20:00

## 2019-04-15 RX ADMIN — Medication 3 MILLILITER(S): at 08:16

## 2019-04-15 RX ADMIN — HEPARIN SODIUM 5000 UNIT(S): 5000 INJECTION INTRAVENOUS; SUBCUTANEOUS at 21:45

## 2019-04-15 RX ADMIN — HEPARIN SODIUM 5000 UNIT(S): 5000 INJECTION INTRAVENOUS; SUBCUTANEOUS at 06:15

## 2019-04-15 NOTE — PHYSICAL THERAPY INITIAL EVALUATION ADULT - ADDITIONAL COMMENTS
Pt lives at home with partner full time. Pt used SC and RW during his daily activities inside apartment and when outside. Pt does not have any stairs inside apt. His building has a ramp he uses to enter building.

## 2019-04-15 NOTE — PROGRESS NOTE ADULT - ASSESSMENT
89M with PMH of SSS s/p PPM, L4-S1 post spinal fusion, L2-S1 laminectomy, HTN, Glaucoma, pyogenic granuloma of eyelid s/p surgery, dementia presenting for b/l LE weakness and back pain, found to have T12-L1 discitis and MSSA BSI.      - surveillance bcx drawn on 4/15 - specimen received   - pending TTE today to r/o endocarditis   - c/w cefazolin 2g IV q8h  - f/u susceptibilities-will add adjuvant rifampin for biofilm penetration given hardware if isolate susceptible     ID Team 1 will follow.

## 2019-04-15 NOTE — PROGRESS NOTE ADULT - PROBLEM SELECTOR PLAN 1
P/w Severe sepsis, T 102F, Leukocytosis 26, reported tachypnea. HR 90s. Lactate elevation to 4.9-> 3.2 -> 1.8. . CRP 20. ESR 30. MRI findings  concerning for discitis and small epidural abscess of T12/L1 No associated significant endplate erosion or marrow edema. Findings are again concerning for discitis at T12-L1 (without significant osteomyelitis at this time). s/p 2.3L NS in the ED.  -F/u BCx - MSSA  - c/w Cefepime (Pending ID approval)  - ID on board  - F/u Ortho spine - aware, no surgical intervention  - Plan for TTE given MSSA bacteremia to rule out Endocarditis P/w Severe sepsis, T 102F, Leukocytosis 26, reported tachypnea. HR 90s. Lactate elevation to 4.9-> 3.2 -> 1.8. . CRP 20. ESR 30. MRI findings  concerning for discitis and small epidural abscess of T12/L1 No associated significant endplate erosion or marrow edema. Findings are again concerning for discitis at T12-L1 (without significant osteomyelitis at this time). s/p 2.3L NS in the ED.  -F/u BCx - MSSA  - c/w Cefazolin   - ID on board  - F/u Ortho spine - aware, no surgical intervention  - Plan for TTE given MSSA bacteremia to rule out Endocarditis

## 2019-04-15 NOTE — PROGRESS NOTE ADULT - SUBJECTIVE AND OBJECTIVE BOX
INTERVAL HPI/OVERNIGHT EVENTS:    SUBJECTIVE: Patient seen and examined at bedside.    OBJECTIVE:    VITAL SIGNS:  ICU Vital Signs Last 24 Hrs  T(C): 36.6 (15 Apr 2019 08:54), Max: 37.3 (14 Apr 2019 16:31)  T(F): 97.8 (15 Apr 2019 08:54), Max: 99.1 (14 Apr 2019 16:31)  HR: 91 (15 Apr 2019 08:54) (63 - 96)  BP: 152/79 (15 Apr 2019 08:54) (144/79 - 164/92)  BP(mean): --  ABP: --  ABP(mean): --  RR: 17 (15 Apr 2019 08:54) (17 - 20)  SpO2: 95% (15 Apr 2019 08:54) (92% - 95%)        04-14 @ 07:01  -  04-15 @ 07:00  --------------------------------------------------------  IN: 0 mL / OUT: 1375 mL / NET: -1375 mL    04-15 @ 07:01  -  04-15 @ 12:24  --------------------------------------------------------  IN: 0 mL / OUT: 300 mL / NET: -300 mL      CAPILLARY BLOOD GLUCOSE      POCT Blood Glucose.: 172 mg/dL (15 Apr 2019 12:19)      PHYSICAL EXAM:    General: WDWN ; NAD  HEENT: NC/AT; PERRL, anicteric sclera  Neck: supple, no JVD  Respiratory: CTA B/L; no W/R/R  Cardiovascular: +S1/S2; RRR; no M/R/G  Gastrointestinal: soft, NT/ND; +BS x4  Extremities: WWP; 2+ peripheral pulses B/L; no LE edema  Skin: normal color and turgor; no rash  Neurological:     MEDICATIONS:  MEDICATIONS  (STANDING):  ALBUTerol/ipratropium for Nebulization 3 milliLiter(s) Nebulizer every 6 hours  ceFAZolin  Injectable. 2000 milliGRAM(s) IV Push every 8 hours  dextrose 5%. 1000 milliLiter(s) (50 mL/Hr) IV Continuous <Continuous>  dextrose 50% Injectable 12.5 Gram(s) IV Push once  dextrose 50% Injectable 25 Gram(s) IV Push once  dextrose 50% Injectable 25 Gram(s) IV Push once  heparin  Injectable 5000 Unit(s) SubCutaneous every 8 hours  insulin lispro (HumaLOG) corrective regimen sliding scale   SubCutaneous Before meals and at bedtime  timolol 0.5% Solution 1 Drop(s) Both EYES every 12 hours    MEDICATIONS  (PRN):  dextrose 40% Gel 15 Gram(s) Oral once PRN Blood Glucose LESS THAN 70 milliGRAM(s)/deciliter  glucagon  Injectable 1 milliGRAM(s) IntraMuscular once PRN Glucose LESS THAN 70 milligrams/deciliter      ALLERGIES:  Allergies    No Known Allergies    Intolerances        LABS:                        12.4   19.19 )-----------( 131      ( 15 Apr 2019 05:58 )             37.4     04-15    143  |  110<H>  |  24<H>  ----------------------------<  123<H>  3.4<L>   |  22  |  0.59    Ca    8.6      15 Apr 2019 05:58  Phos  2.9     04-14  Mg     2.3     04-15    TPro  6.0  /  Alb  2.9<L>  /  TBili  0.8  /  DBili  x   /  AST  273<H>  /  ALT  71<H>  /  AlkPhos  83  04-14    LIVER FUNCTIONS - ( 14 Apr 2019 07:28 )  Alb: 2.9 g/dL / Pro: 6.0 g/dL / ALK PHOS: 83 U/L / ALT: 71 U/L / AST: 273 U/L / GGT: x             Urinalysis Basic - ( 13 Apr 2019 12:43 )    Color: Yellow / Appearance: Clear / SG: >=1.030 / pH: x  Gluc: x / Ketone: Trace mg/dL  / Bili: Negative / Urobili: 0.2 E.U./dL   Blood: x / Protein: 100 mg/dL / Nitrite: NEGATIVE   Leuk Esterase: NEGATIVE / RBC: Many /HPF / WBC < 5 /HPF   Sq Epi: x / Non Sq Epi: 0-5 /HPF / Bacteria: Present /HPF      CARDIAC MARKERS ( 14 Apr 2019 20:37 )  x     / x     / 3872 U/L / x     / x      CARDIAC MARKERS ( 14 Apr 2019 07:28 )  x     / x     / 6728 U/L / x     / x      CARDIAC MARKERS ( 13 Apr 2019 22:41 )  x     / x     / 7744 U/L / x     / x            RADIOLOGY & ADDITIONAL TESTS: Reviewed. INTERVAL HPI/OVERNIGHT EVENTS: Admitted for management of sepsis 2/2 discitis T12 L1 with MSSA bacteremia.    SUBJECTIVE: Patient seen and examined at bedside. AAOx1, no complaints overnight, afebrile, no chest pain, changes in bowel movements, palpitations.     OBJECTIVE:    VITAL SIGNS:  ICU Vital Signs Last 24 Hrs  T(C): 36.6 (15 Apr 2019 08:54), Max: 37.3 (14 Apr 2019 16:31)  T(F): 97.8 (15 Apr 2019 08:54), Max: 99.1 (14 Apr 2019 16:31)  HR: 91 (15 Apr 2019 08:54) (63 - 96)  BP: 152/79 (15 Apr 2019 08:54) (144/79 - 164/92)  RR: 17 (15 Apr 2019 08:54) (17 - 20)  SpO2: 95% (15 Apr 2019 08:54) (92% - 95%)        04-14 @ 07:01  -  04-15 @ 07:00  --------------------------------------------------------  IN: 0 mL / OUT: 1375 mL / NET: -1375 mL    04-15 @ 07:01  -  04-15 @ 12:24  --------------------------------------------------------  IN: 0 mL / OUT: 300 mL / NET: -300 mL      CAPILLARY BLOOD GLUCOSE      POCT Blood Glucose.: 172 mg/dL (15 Apr 2019 12:19)      General: Elderly male sitting in bed NAD  HEENT: Anicteric sclera. PERRL. EOMI. Oral mucosa moist.   Neck: No appreciable JVD  Resp: Diffuse wheezing bilaterally, bibasilar crackles  Back: No point tenderness  Cardiac: S1/S2 appreciated, No murmurs, rubs or gallops. Regular Rate and Rhythm.  GI: Soft, mild distension without tenderness  + BS, Tympanic to percussion.  Neuro: AAOx2(Person, place), following commands, No facial droop. No dysarthria, no  aphasia.  Pupils equally round and reactive to light, visual fields full, no nystagmus,  extraocular muscles intact, V1 through V3 intact bilaterally and symmetric, no facial droop,  palate elevation symmetric, tongue was midline,5/5 strength in upper and 4/5 lower  extremities. 0+patellar reflex. 1+ bicep and brachioradialis b/l. Sensation intact.   Extremities: No edema noted, DP intact  Skin: no  ulcers, midline scar on lumbar spine region    MEDICATIONS:  MEDICATIONS  (STANDING):  ALBUTerol/ipratropium for Nebulization 3 milliLiter(s) Nebulizer every 6 hours  ceFAZolin  Injectable. 2000 milliGRAM(s) IV Push every 8 hours  dextrose 5%. 1000 milliLiter(s) (50 mL/Hr) IV Continuous <Continuous>  dextrose 50% Injectable 12.5 Gram(s) IV Push once  dextrose 50% Injectable 25 Gram(s) IV Push once  dextrose 50% Injectable 25 Gram(s) IV Push once  heparin  Injectable 5000 Unit(s) SubCutaneous every 8 hours  insulin lispro (HumaLOG) corrective regimen sliding scale   SubCutaneous Before meals and at bedtime  timolol 0.5% Solution 1 Drop(s) Both EYES every 12 hours    MEDICATIONS  (PRN):  dextrose 40% Gel 15 Gram(s) Oral once PRN Blood Glucose LESS THAN 70 milliGRAM(s)/deciliter  glucagon  Injectable 1 milliGRAM(s) IntraMuscular once PRN Glucose LESS THAN 70 milligrams/deciliter      ALLERGIES:  Allergies    No Known Allergies    Intolerances        LABS:                        12.4   19.19 )-----------( 131      ( 15 Apr 2019 05:58 )             37.4     04-15    143  |  110<H>  |  24<H>  ----------------------------<  123<H>  3.4<L>   |  22  |  0.59    Ca    8.6      15 Apr 2019 05:58  Phos  2.9     04-14  Mg     2.3     04-15    TPro  6.0  /  Alb  2.9<L>  /  TBili  0.8  /  DBili  x   /  AST  273<H>  /  ALT  71<H>  /  AlkPhos  83  04-14    LIVER FUNCTIONS - ( 14 Apr 2019 07:28 )  Alb: 2.9 g/dL / Pro: 6.0 g/dL / ALK PHOS: 83 U/L / ALT: 71 U/L / AST: 273 U/L / GGT: x             Urinalysis Basic - ( 13 Apr 2019 12:43 )    Color: Yellow / Appearance: Clear / SG: >=1.030 / pH: x  Gluc: x / Ketone: Trace mg/dL  / Bili: Negative / Urobili: 0.2 E.U./dL   Blood: x / Protein: 100 mg/dL / Nitrite: NEGATIVE   Leuk Esterase: NEGATIVE / RBC: Many /HPF / WBC < 5 /HPF   Sq Epi: x / Non Sq Epi: 0-5 /HPF / Bacteria: Present /HPF      CARDIAC MARKERS ( 14 Apr 2019 20:37 )  x     / x     / 3872 U/L / x     / x      CARDIAC MARKERS ( 14 Apr 2019 07:28 )  x     / x     / 6728 U/L / x     / x      CARDIAC MARKERS ( 13 Apr 2019 22:41 )  x     / x     / 7744 U/L / x     / x            RADIOLOGY & ADDITIONAL TESTS: Reviewed.

## 2019-04-15 NOTE — PROGRESS NOTE ADULT - SUBJECTIVE AND OBJECTIVE BOX
INFECTIOUS DISEASE CONSULT PROGRESS NOTE    INTERVAL HPI/OVERNIGHT EVENTS:    ACTIVE ANTIMICROBIALS/ANTIBIOTICS:  ceFAZolin  Injectable. 2000 milliGRAM(s) IV Push every 8 hours      VITAL SIGNS:  ICU Vital Signs Last 24 Hrs  T(C): 36.3 (15 Apr 2019 05:24), Max: 37.3 (14 Apr 2019 16:31)  T(F): 97.4 (15 Apr 2019 05:24), Max: 99.1 (14 Apr 2019 16:31)  HR: 82 (15 Apr 2019 05:24) (63 - 96)  BP: 144/79 (15 Apr 2019 05:24) (144/79 - 164/92)  BP(mean): --  ABP: --  ABP(mean): --  RR: 19 (15 Apr 2019 05:24) (18 - 20)  SpO2: 95% (15 Apr 2019 05:24) (92% - 95%)      PHYSICAL EXAM:    LABS:                        12.4   19.19 )-----------( 131      ( 15 Apr 2019 05:58 )             37.4       04-15    143  |  110<H>  |  24<H>  ----------------------------<  123<H>  3.4<L>   |  22  |  0.59    Ca    8.6      15 Apr 2019 05:58  Phos  2.9     04-14  Mg     2.3     04-15    TPro  6.0  /  Alb  2.9<L>  /  TBili  0.8  /  DBili  x   /  AST  273<H>  /  ALT  71<H>  /  AlkPhos  83  04-14    Urinalysis Basic - ( 13 Apr 2019 12:43 )    Color: Yellow / Appearance: Clear / SG: >=1.030 / pH: x  Gluc: x / Ketone: Trace mg/dL  / Bili: Negative / Urobili: 0.2 E.U./dL   Blood: x / Protein: 100 mg/dL / Nitrite: NEGATIVE   Leuk Esterase: NEGATIVE / RBC: Many /HPF / WBC < 5 /HPF   Sq Epi: x / Non Sq Epi: 0-5 /HPF / Bacteria: Present /HPF          MICROBIOLOGY:    Culture - Blood (collected 04-13-19 @ 20:46)  Source: .Blood Blood  Gram Stain (04-14-19 @ 08:42):    Aerobic and Anaerobic Bottle: Gram Positive Cocci in Clusters    Result called to and read back by_ Ms. BELLO Ortegajason TUCKER  04/14/2019 08:35:39  Preliminary Report (04-14-19 @ 08:43):    Culture in progress    Culture - Blood (collected 04-13-19 @ 20:46)  Source: .Blood Blood  Gram Stain (04-14-19 @ 08:36):    Aerobic and Anaerobic Bottle: Gram Positive Cocci in Clusters    Result called to and read back by_ Ms. BELLO Hurtado CAITLIN  04/14/2019 08:35:39    ***Blood Panel PCR results on this specimen are available    approximately 3 hours after the Gram stain result.***    Gram stain, PCR, and/or culture results may not always    correspond due to difference in methodologies.    ************************************************************    This PCR assay was performed using [employer].    The following targets are tested for: Enterococcus,    vancomycin resistant enterococci, Listeria monocytogenes,    coagulase negative staphylococci, S. aureus,    methicillin resistant S. aureus, Streptococcus agalactiae    (Group B), S. pneumoniae, S. pyogenes (Group A),    Acinetobacter baumannii, Enterobacter cloacae, E. coli,    Klebsiella oxytoca, K. pneumoniae, Proteus sp.,    Serratia marcescens, Haemophilus influenzae,    Neisseria meningitidis, Pseudomonas aeruginosa, Candida    albicans, C. glabrata, C krusei, C parapsilosis,    C. tropicalis and the KPC resistance gene.    "Due to technical problems, Proteus sp. will Not be reported as part of    the BCID panel until further notice"  Preliminary Report (04-14-19 @ 08:39):    Culture in progress  Organism: Blood Culture PCR (04-14-19 @ 09:35)  Organism: Blood Culture PCR (04-14-19 @ 09:35)      -  Staphylococcus aureus: Detec Any isolate of Staphylococcus aureus from a blood culture is NOT considered a contaminant.      Method Type: PCR    Culture - Urine (collected 04-13-19 @ 14:27)  Source: .Urine Clean Catch (Midstream)  Final Report (04-14-19 @ 08:34):    No growth        RADIOLOGY & ADDITIONAL STUDIES: INFECTIOUS DISEASE CONSULT PROGRESS NOTE    INTERVAL HPI/OVERNIGHT EVENTS: No fevers overnight. WBC trended 23 -> 19. Patient denies any complaints in the AM, denies fevers, chest pain, shortness of breath. AAOx2 (person, states he is at [facility]).       ACTIVE ANTIMICROBIALS/ANTIBIOTICS:  ceFAZolin  Injectable. 2000 milliGRAM(s) IV Push every 8 hours    VITAL SIGNS:  T(C): 36.3 (15 Apr 2019 05:24), Max: 37.3 (14 Apr 2019 16:31)  T(F): 97.4 (15 Apr 2019 05:24), Max: 99.1 (14 Apr 2019 16:31)  HR: 82 (15 Apr 2019 05:24) (63 - 96)  BP: 144/79 (15 Apr 2019 05:24) (144/79 - 164/92)  BP(mean): --  ABP: --  ABP(mean): --  RR: 19 (15 Apr 2019 05:24) (18 - 20)  SpO2: 95% (15 Apr 2019 05:24) (92% - 95%)      PHYSICAL EXAM:  General: Elderly white male, +tachypnea to RR 25   HEENT: EOMI, MMM   Cardiac: +S1S2, RRR  Lungs: Decreased breath sounds at bases, R crackles, +mild wheezing   GI: soft, nontender   Extremities: no peripheral edema   Neuro: 4/5 muscular strength in the lower extremities, sensation intact       LABS:                        12.4   19.19 )-----------( 131      ( 15 Apr 2019 05:58 )             37.4       04-15    143  |  110<H>  |  24<H>  ----------------------------<  123<H>  3.4<L>   |  22  |  0.59    Ca    8.6      15 Apr 2019 05:58  Phos  2.9     04-14  Mg     2.3     04-15    TPro  6.0  /  Alb  2.9<L>  /  TBili  0.8  /  DBili  x   /  AST  273<H>  /  ALT  71<H>  /  AlkPhos  83  04-14    Urinalysis Basic - ( 13 Apr 2019 12:43 )    Color: Yellow / Appearance: Clear / SG: >=1.030 / pH: x  Gluc: x / Ketone: Trace mg/dL  / Bili: Negative / Urobili: 0.2 E.U./dL   Blood: x / Protein: 100 mg/dL / Nitrite: NEGATIVE   Leuk Esterase: NEGATIVE / RBC: Many /HPF / WBC < 5 /HPF   Sq Epi: x / Non Sq Epi: 0-5 /HPF / Bacteria: Present /HPF    MICROBIOLOGY:    Culture - Blood (collected 04-13-19 @ 20:46)  Source: .Blood Blood  Gram Stain (04-14-19 @ 08:42):    Aerobic and Anaerobic Bottle: Gram Positive Cocci in Clusters    Result called to and read back by_ Ms. BELLO Annebrenda TUCKER  04/14/2019 08:35:39  Preliminary Report (04-14-19 @ 08:43):    Culture in progress    Culture - Blood (collected 04-13-19 @ 20:46)  Source: .Blood Blood  Gram Stain (04-14-19 @ 08:36):    Aerobic and Anaerobic Bottle: Gram Positive Cocci in Clusters    Result called to and read back by_ Ms. BELLO Annebrenda TUCKER  04/14/2019 08:35:39    ***Blood Panel PCR results on this specimen are available    approximately 3 hours after the Gram stain result.***    Gram stain, PCR, and/or culture results may not always    correspond due to difference in methodologies.    ************************************************************    This PCR assay was performed using [employer].    The following targets are tested for: Enterococcus,    vancomycin resistant enterococci, Listeria monocytogenes,    coagulase negative staphylococci, S. aureus,    methicillin resistant S. aureus, Streptococcus agalactiae    (Group B), S. pneumoniae, S. pyogenes (Group A),    Acinetobacter baumannii, Enterobacter cloacae, E. coli,    Klebsiella oxytoca, K. pneumoniae, Proteus sp.,    Serratia marcescens, Haemophilus influenzae,    Neisseria meningitidis, Pseudomonas aeruginosa, Candida    albicans, C. glabrata, C krusei, C parapsilosis,    C. tropicalis and the KPC resistance gene.    "Due to technical problems, Proteus sp. will Not be reported as part of    the BCID panel until further notice"  Preliminary Report (04-14-19 @ 08:39):    Culture in progress  Organism: Blood Culture PCR (04-14-19 @ 09:35)  Organism: Blood Culture PCR (04-14-19 @ 09:35)      -  Staphylococcus aureus: Detec Any isolate of Staphylococcus aureus from a blood culture is NOT considered a contaminant.      Method Type: PCR    Culture - Urine (collected 04-13-19 @ 14:27)  Source: .Urine Clean Catch (Midstream)  Final Report (04-14-19 @ 08:34):    No growth        RADIOLOGY & ADDITIONAL STUDIES:

## 2019-04-15 NOTE — PROGRESS NOTE ADULT - ASSESSMENT
89M with PMH of SSS s/p PPM, HTN, Glaucoma in both eyes (multiple surgeries at [facility]), pyogenic granuloma of eyelid s/p surgery, dementia presenting for b/l LE weakness and back pain in setting of altered mental status. Patient admitted to [facility] for metabolic encephalopathy, Rhabdomyolysis and severe sepsis- concerning for discitis.

## 2019-04-15 NOTE — PROGRESS NOTE ADULT - PROBLEM SELECTOR PLAN 4
/ALT 47, initial TBili 1.6 with Alk Phos 182, likely in setting of Rhabdo cause of transaminitis.  -c/w monitoring LFTs  -Hep panel negative

## 2019-04-15 NOTE — PROGRESS NOTE ADULT - PROBLEM SELECTOR PLAN 2
Most likely etiology secondary to ongoing infection- considering patient came in with severe sepsis. With resuscitation patient able to better respond to questions.  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

## 2019-04-15 NOTE — PROGRESS NOTE ADULT - ASSESSMENT
89 yo M admitted with weakness, fever, altered mental status. He has a history of sick sinus syndrome s/p PPM, dementia (on galantamine), LBP s/p posterior spinal fusion L4-S1 and L2-S1 laminectomy 10 years ago, HTN, glaucoma in both eyes s/p multiple surgeries at [facility], pyogenic granuloma of eyelid presenting with complaints of feeling confused with generalized weakness , trouble getting out of bed . Pt noted to be septic upon arrival to  ED. He was started on cefepime and vancomycin for sepsis (fever, tachypnea, elevated WBC and lactate) in the setting of possible discitis found on CT/MRI lumbar spine. CT lumbar spine with contrast was concerning for early discitis vs osteomyelitis at T12-L1 level. MRI lumbar spine with contrast showed abnormal signal at T12-L1 disc with disc space expansion, concerning for discitis. Blood cultures were positive for Staph aureus. Pt also found to have rhabdomyolysis with elevated CK and myoglobinuria. ID on board for bacteremia and pt currently on cefazolin for MSSA BSI  Neurology was consulted for weakness.    -On exam patient with fairly good strength except right hip flexion which is at least 3/5  -MRI C SPINE,  MRI T SPINE , MRI L Spine complete  -C spine with No MR evidence of osteomyelitis. No epidural abscess. Multilevel degenerative disc disease with disc-osteophyte complex all levels of the cervical spine including focal disc herniations at C2-C3, C5-C6 and C6-C7 levels. Central canal stenosis C5-C6 and C6-C7 related to acquired etiologies from disc disease and arthritis. Multilevel foraminal narrowing, as above, due to facet arthropathy and uncovertebral spurring, greatest at C3-C4 and C5-C6 levels.  -MRI t spine showing T12-L1 level marked widening of the disc space with edema, most consistent with discitis .There are mild erosive changes of the endplates with mild enhancement consistent with early osteomyelitis. Mild posterior enhancement at T12 -L1 level concerning for a small epidural abscess measuring approximately 9 mm in thickness. T6-T7 level, central disc protrusion, cord impingement, mild to moderate spinal canal narrowing.T12-L1 level, disc bulge, ligamentous hypertrophy, epidural disease, moderate spinal canal narrowing, slight conus impingement.  -Etiology of LE weakness likely related to findings as above, there is combination of degenerative changes as well as  discitis, sight conus impingement which may be contributing to LE weakness   -will continue abx as recommended by ID  -Please consult ortho spine  -neurology will continue to follow 89 yo M admitted with weakness, fever, altered mental status. He has a history of sick sinus syndrome s/p PPM, dementia (on galantamine), LBP s/p posterior spinal fusion L4-S1 and L2-S1 laminectomy 10 years ago, HTN, glaucoma in both eyes s/p multiple surgeries at [facility], pyogenic granuloma of eyelid presenting with complaints of feeling confused with generalized weakness , trouble getting out of bed , noted to be septic (MSSA bacteremia) with imaging showing T12-L1 disc with disc space expansion, concerning for discitis, also with small epidural abscess at t12-L1. Neurology was consulted for LE weakness.    -On exam patient with fairly good strength except right hip flexion which is at least 3/5  -MRI C SPINE,  MRI T SPINE , MRI L Spine complete  -C spine with No MR evidence of osteomyelitis. No epidural abscess. Multilevel degenerative disc disease with disc-osteophyte complex all levels of the cervical spine including focal disc herniations at C2-C3, C5-C6 and C6-C7 levels. Central canal stenosis C5-C6 and C6-C7 related to acquired etiologies from disc disease and arthritis. Multilevel foraminal narrowing, as above, due to facet arthropathy and uncovertebral spurring, greatest at C3-C4 and C5-C6 levels.  -MRI t spine showing T12-L1 level marked widening of the disc space with edema, most consistent with discitis .There are mild erosive changes of the endplates with mild enhancement consistent with early osteomyelitis. Mild posterior enhancement at T12 -L1 level concerning for a small epidural abscess measuring approximately 9 mm in thickness. T6-T7 level, central disc protrusion, cord impingement, mild to moderate spinal canal narrowing.T12-L1 level, disc bulge, ligamentous hypertrophy, epidural disease, moderate spinal canal narrowing, slight conus impingement.  -Etiology of LE weakness likely related to findings as above, there is combination of degenerative changes as well as  discitis, sight conus impingement which may be contributing to LE weakness   -will continue abx as recommended by ID  -Please consult ortho spine  -neurology will continue to follow 89 yo M admitted with weakness, fever, altered mental status. He has a history of sick sinus syndrome s/p PPM, dementia (on galantamine), LBP s/p posterior spinal fusion L4-S1 and L2-S1 laminectomy 10 years ago, HTN, glaucoma in both eyes s/p multiple surgeries at [facility], pyogenic granuloma of eyelid presenting with complaints of feeling confused with generalized weakness , trouble getting out of bed , noted to be septic (MSSA bacteremia) with imaging showing T12-L1 disc with disc space expansion, concerning for discitis, also with small epidural abscess at t12-L1. Neurology was consulted for LE weakness.    -On exam patient with fairly good strength except right hip flexion which is at least 3/5  -MRI C SPINE,  MRI T SPINE , MRI L Spine complete  -C spine with No MR evidence of osteomyelitis. No epidural abscess. Multilevel degenerative disc disease with disc-osteophyte complex all levels of the cervical spine including focal disc herniations at C2-C3, C5-C6 and C6-C7 levels. Central canal stenosis C5-C6 and C6-C7 . Multilevel foraminal narrowing greatest at C3-C4 and C5-C6 levels.  -Abnormal signal at T12-L1 disc with fluid expanding the disc space Findings are again concerning for discitis at T12-L1 (without significant osteomyelitis at this time).  -T12-L1 level, disc bulge, ligamentous hypertrophy, epidural disease, moderate spinal canal narrowing, slight conus impingement.  -Etiology of LE weakness likely related to findings as above, there is combination of degenerative changes as well as  discitis, sight conus impingement which may be contributing to LE weakness   -will continue abx as recommended by ID  -Please consult ortho spine  -neurology will continue to follow 89 yo M admitted with weakness, fever, altered mental status. He has a history of sick sinus syndrome s/p PPM, dementia (on galantamine), LBP s/p posterior spinal fusion L4-S1 and L2-S1 laminectomy 10 years ago, HTN, glaucoma in both eyes s/p multiple surgeries at [facility], pyogenic granuloma of eyelid presenting with complaints of feeling confused with generalized weakness , trouble getting out of bed , noted to be septic (MSSA bacteremia) with imaging showing T12-L1 disc with disc space expansion, concerning for discitis, also with small epidural abscess at t12-L1. Neurology was consulted for LE weakness.    -On exam patient with fairly good strength except right hip flexion which is at least 3/5  -MRI C SPINE,  MRI T SPINE , MRI L Spine complete  -C spine with No MR evidence of osteomyelitis. No epidural abscess. Multilevel degenerative disc disease with disc-osteophyte complex all levels of the cervical spine including focal disc herniations at C2-C3, C5-C6 and C6-C7 levels. Central canal stenosis C5-C6 and C6-C7 . Multilevel foraminal narrowing greatest at C3-C4 and C5-C6 levels.  -Abnormal signal at T12-L1 disc with fluid expanding the disc space Findings are again concerning for discitis at T12-L1 with epidural abscess (without significant osteomyelitis at this time).  -T12-L1 level, disc bulge, ligamentous hypertrophy, epidural disease, moderate spinal canal narrowing, slight conus impingement.  -Etiology of LE weakness likely related to findings as above, there is combination of degenerative changes as well as  discitis, sight conus impingement which may be contributing to LE weakness   -will continue abx as recommended by ID  -Please consult ortho spine  -neurology will continue to follow

## 2019-04-15 NOTE — PROGRESS NOTE ADULT - SUBJECTIVE AND OBJECTIVE BOX
Neurology follow up  89 yo M admitted with weakness, fever, altered mental status. He has a history of sick sinus syndrome s/p PPM, dementia (on galantamine), LBP s/p posterior spinal fusion L4-S1 and L2-S1 laminectomy 10 years ago, HTN, glaucoma in both eyes s/p multiple surgeries at [facility], pyogenic granuloma of eyelid presenting with complaints of feeling confused with generalized weakness , trouble getting out of bed . Pt noted to be septic upon arrival to  ED. He was started on cefepime and vancomycin for sepsis (fever, tachypnea, elevated WBC and lactate) in the setting of possible discitis found on CT/MRI lumbar spine. CT lumbar spine with contrast was concerning for early discitis vs osteomyelitis at T12-L1 level. MRI lumbar spine with contrast showed abnormal signal at T12-L1 disc with disc space expansion, concerning for discitis. Blood cultures were positive for Staph aureus. Pt also found to have rhabdomyolysis with elevated CK and myoglobinuria. ID on board for bacteremia and pt currently on cefazolin for MSSA BSI  Neurology was consulted for weakness.    MEDICATIONS  (STANDING):  ALBUTerol/ipratropium for Nebulization 3 milliLiter(s) Nebulizer every 6 hours  ceFAZolin  Injectable. 2000 milliGRAM(s) IV Push every 8 hours  dextrose 5%. 1000 milliLiter(s) (50 mL/Hr) IV Continuous <Continuous>  dextrose 50% Injectable 12.5 Gram(s) IV Push once  dextrose 50% Injectable 25 Gram(s) IV Push once  dextrose 50% Injectable 25 Gram(s) IV Push once  heparin  Injectable 5000 Unit(s) SubCutaneous every 8 hours  insulin lispro (HumaLOG) corrective regimen sliding scale   SubCutaneous Before meals and at bedtime  timolol 0.5% Solution 1 Drop(s) Both EYES every 12 hours    MEDICATIONS  (PRN):  dextrose 40% Gel 15 Gram(s) Oral once PRN Blood Glucose LESS THAN 70 milliGRAM(s)/deciliter  glucagon  Injectable 1 milliGRAM(s) IntraMuscular once PRN Glucose LESS THAN 70 milligrams/deciliter      Allergies    No Known Allergies    Intolerances        Vital Signs Last 24 Hrs  T(C): 36.6 (15 Apr 2019 08:54), Max: 37.3 (14 Apr 2019 16:31)  T(F): 97.8 (15 Apr 2019 08:54), Max: 99.1 (14 Apr 2019 16:31)  HR: 91 (15 Apr 2019 08:54) (63 - 96)  BP: 152/79 (15 Apr 2019 08:54) (144/79 - 164/92)  BP(mean): --  RR: 17 (15 Apr 2019 08:54) (17 - 20)  SpO2: 95% (15 Apr 2019 08:54) (92% - 95%)    PHYSICAL EXAM:  Constitutional: WDWN; NAD  Neurologic:  Mental status: Awake, alert and oriented x3.   = Attention/concentration intact.  No dysarthria, no aphasia.   Cranial nerves: Pupils equally round and reactive to light, visual fields full, no nystagmus, extraocular muscles intact, V1 through V3 intact bilaterally and symmetric, mild right sided eyelid droop noted. , sternocleidomastoid/shoulder shrug strength bilaterally 5/5.    Motor:  Normal bulk and tone, strength atleast 4/5 in bilateral upper extremities. Strength 4/5 in LLE , at least 3/5 in right hip flexion, 4/5 right knee flexion extension, 4/5 right ankle flexion, extension.    Sensation: Intact to light touch  Coordination: No dysmetria on finger-to-nose   Reflexes:, downgoing toes bilaterally  Gait: did not assess        Fingerstick Blood Glucose: CAPILLARY BLOOD GLUCOSE      POCT Blood Glucose.: 131 mg/dL (15 Apr 2019 08:22)       LABS:                        12.4   19.19 )-----------( 131      ( 15 Apr 2019 05:58 )             37.4     04-15    143  |  110<H>  |  24<H>  ----------------------------<  123<H>  3.4<L>   |  22  |  0.59    Ca    8.6      15 Apr 2019 05:58  Phos  2.9     04-14  Mg     2.3     04-15    TPro  6.0  /  Alb  2.9<L>  /  TBili  0.8  /  DBili  x   /  AST  273<H>  /  ALT  71<H>  /  AlkPhos  83  04-14      CARDIAC MARKERS ( 14 Apr 2019 20:37 )  x     / x     / 3872 U/L / x     / x      CARDIAC MARKERS ( 14 Apr 2019 07:28 )  x     / x     / 6728 U/L / x     / x      CARDIAC MARKERS ( 13 Apr 2019 22:41 )  x     / x     / 7744 U/L / x     / x          Urinalysis Basic - ( 13 Apr 2019 12:43 )    Color: Yellow / Appearance: Clear / SG: >=1.030 / pH: x  Gluc: x / Ketone: Trace mg/dL  / Bili: Negative / Urobili: 0.2 E.U./dL   Blood: x / Protein: 100 mg/dL / Nitrite: NEGATIVE   Leuk Esterase: NEGATIVE / RBC: Many /HPF / WBC < 5 /HPF   Sq Epi: x / Non Sq Epi: 0-5 /HPF / Bacteria: Present /HPF      < from: MR Cervical Spine w/ IV Cont (04.14.19 @ 17:55) >  IMPRESSION:    1. No MR evidence of osteomyelitis. No epidural abscess. Multilevel degenerative disc disease with disc-osteophyte complex all levels of the cervical spine including focal disc herniations at C2-C3, C5-C6 and C6-C7 levels. Central canal stenosis C5-C6 and C6-C7 related to acquired etiologies from disc disease and arthritis. Multilevel foraminal narrowing, as above, due to facet arthropathy and uncovertebral spurring, greatest at C3-C4 and C5-C6 levels.    < from: MR Lumbar Spine w/ IV Cont (04.13.19 @ 19:01) >  Abnormal signal at T12-L1 disc with fluid expanding the disc space (i.e.   lining of the disc space, especially anteriorly). No associated   significant endplate erosion or marrow edema. Findings are again   concerning for discitis at T12-L1 (without significant osteomyelitis at   this time). However, due to atypical MRI presentation of this process   (primarily, lack of endplate erosive changes and marrow edema), this   finding could alternatively represent traumatic disc distraction or,   least likely, sequela of chronic degenerative changes. Correlation with   recent history of trauma to the thoracolumbar junction and also prior   imaging (if available on-site or at outside institutions) would be   helpful. No epidural abscess or paraspinal phlegmon.  Redemonstration of advanced multilevel spondylosis and multilevel   posterior fusion. No hardware failure. No acute fracture or dislocation.   Canal stenosis is moderate at L5-S1. Otherwise, no highgrade canal   stenosis. Moderate to severe multilevel bilateral foraminal narrowing   also redemonstrated. Please see subsequent final report for details.    IMPRESSION: Abnormal signal at T12-L1 disc with disc space expansion,   without associated endplate erosion or marrow edema. Differential   diagnoses as above.      < end of copied text >      < from: MR Thoracic Spine w/ IV Cont (04.14.19 @ 17:56) >  1. T12-L1 level, again, marked widening of the disc space with edema, most consistent with discitis.There are mild erosive changes of the endplates with mild enhancement consistent with early osteomyelitis. Mild posterior enhancement at T12 -L1 level concerning for a small epidural abscess measuring approximately 9 mm in thickness. T6-T7 level, central disc protrusion, cord impingement, mild to moderate spinal canal narrowing.T12-L1 level, disc bulge, ligamentous hypertrophy, epidural disease, moderate spinal canalnarrowing, slight conus impingement.    < end of copied text >    < end of copied text >

## 2019-04-15 NOTE — PROGRESS NOTE ADULT - PROBLEM SELECTOR PLAN 3
Has not been walking much per patient. CK at 7744 downtrending on AM labs  - Discontinued, unknown EF, will continue to monitor fluid status

## 2019-04-15 NOTE — CONSULT NOTE ADULT - SUBJECTIVE AND OBJECTIVE BOX
Patient is a 89y old  Male who presents with a chief complaint of Weakness, Altered mental status (15 Apr 2019 12:24)       HPI:  89M with PMH of SSS s/p PPM, HTN, Glaucoma in both eyes (multiple surgeries at [facility]), pyogenic granuloma of eyelid s/p surgery, dementia presenting for b/l LE weakness and back pain in setting of altered mental status. Patient initially unsure why he was brought into ED but upon asking about symptoms of back pain and LE weakness- he states over last week developed progressively worsening lower back pain, no radiation of pain. Patient states he has had hx of LBP for which has had surgery in the past (confirmed on imaging that has prior posterior spinal fusion from L4 to S1 and prior L2-S1 laminectomy.). Reports also progressively worsening weakness for which he was unable to get out of bed today.     Upon arrival to ED, VS /71, HR 88, RR 17, T 98.4F -> later spiked to 102F rectal, O2 95% on RA. Labs notable for WBC 26.35, /ALT 47, initial TBili 1.6 with Alk Phos 182. Cr at 0.92, Lactate at 4.9. CK at 7744. Noted to be tachypneic for which placed on HFNC. CXR with questionable LLL infiltrate. CT lumbar spine with finding concerning for early discitis and/or osteomyelitis at T12-L1 level with no definitive epidural abscess. ICU Consulted for altered mental status and weakness. Ortho consulted in setting of finding concerning for discitis. MRI performed-initially with low suspicion for discitis/osteo. Therefore per ortho no intervention. Upon re-evaluation MRI demonstrated abnormal signal at T12-L1 disc with fluid expanding the disc space (i.e. lining of the disc space, especially anteriorly). No associated significant endplate erosion or marrow edema. Findings are again concerning for discitis at T12-L1 (without significant osteomyelitis at this time). Ortho reconsulted for which plan for IR biopsy but no other intervention at this time.  Patient reports that pain located in lower back above sacrum, no radiation of pain. Says he has had abdominal pain that he states sometimes is confused with his back pain. No changes in bowel habits. No dysuria- + incontinence. Denies recent fevers chills. No trauma. (2019 01:45)      PAST MEDICAL & SURGICAL HISTORY:  RBBB (right bundle branch block with left anterior fascicular block)  HLD (hyperlipidemia)  Essential hypertension  S/P lumbar spinal fusion  H/O laminectomy      MEDICATIONS  (STANDING):  ALBUTerol/ipratropium for Nebulization 3 milliLiter(s) Nebulizer every 6 hours  ceFAZolin  Injectable. 2000 milliGRAM(s) IV Push every 8 hours  dextrose 5%. 1000 milliLiter(s) (50 mL/Hr) IV Continuous <Continuous>  dextrose 50% Injectable 12.5 Gram(s) IV Push once  dextrose 50% Injectable 25 Gram(s) IV Push once  dextrose 50% Injectable 25 Gram(s) IV Push once  heparin  Injectable 5000 Unit(s) SubCutaneous every 8 hours  insulin lispro (HumaLOG) corrective regimen sliding scale   SubCutaneous Before meals and at bedtime  timolol 0.5% Solution 1 Drop(s) Both EYES every 12 hours    MEDICATIONS  (PRN):  dextrose 40% Gel 15 Gram(s) Oral once PRN Blood Glucose LESS THAN 70 milliGRAM(s)/deciliter  glucagon  Injectable 1 milliGRAM(s) IntraMuscular once PRN Glucose LESS THAN 70 milligrams/deciliter      Social History: lives with his "partner" in an elevator accessible apartment building no home care services    Functional Level Prior to Admission: states he is ADL independent, walks with a cane/ rolling walker    FAMILY HISTORY:  No pertinent family history in first degree relatives      CBC Full  -  ( 15 Apr 2019 05:58 )  WBC Count : 19.19 K/uL  RBC Count : 4.07 M/uL  Hemoglobin : 12.4 g/dL  Hematocrit : 37.4 %  Platelet Count - Automated : 131 K/uL  Mean Cell Volume : 91.9 fl  Mean Cell Hemoglobin : 30.5 pg  Mean Cell Hemoglobin Concentration : 33.2 gm/dL  Auto Neutrophil # : x  Auto Lymphocyte # : x  Auto Monocyte # : x  Auto Eosinophil # : x  Auto Basophil # : x  Auto Neutrophil % : x  Auto Lymphocyte % : x  Auto Monocyte % : x  Auto Eosinophil % : x  Auto Basophil % : x      04-15    143  |  110<H>  |  24<H>  ----------------------------<  123<H>  3.4<L>   |  22  |  0.59    Ca    8.6      15 Apr 2019 05:58  Phos  2.9     04-14  Mg     2.3     04-15    TPro  6.0  /  Alb  2.9<L>  /  TBili  0.8  /  DBili  x   /  AST  273<H>  /  ALT  71<H>  /  AlkPhos  83              Radiology:    < from: CT Head No Cont (19 @ 11:05) >  EXAM:  CT BRAIN                          PROCEDURE DATE:  2019          INTERPRETATION:  HEAD CT WITHOUT CONTRAST dated 2019 11:05 AM     HISTORY: Upper and lower ext weakness x 1 day.     TECHNIQUE: Head CT was performed without intravenous contrast. Axial,   sagittal, and coronal images were obtained.    COMPARISON: None     FINDINGS:     There is age-appropriate parenchymal volume loss.  Patchy diminished   attenuation is noted within the periventricular white matter consistent   with chronic microangiopathic ischemic disease. There are lacunar   infarcts noted in the knees ganglia bilaterally and external capsule   region bilaterally. There is no acute intracranial hemorrhage or mass   lesion. There is extra-axial fluid collection, midline shift, or focal   mass effect.     The calvarium is intact. The visualized paranasal sinuses and mastoid air   cells are predominantly clear. Bilateral intraocular lens replacements   are noted. 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    IMPRESSION:     1. No acute intracranial hemorrhage. No acute abnormality.  2. Chronic microangiopathic disease including bilateral lacunar infarcts,   as above. If there is concern for acute infarct, consideration could be   given to brain MR with diffusion-weighted imaging as this is a more   sensitive study for its detection.  3. Age-appropriate volume loss.      < from: CT Lumbar Spine w/ IV Cont (19 @ 14:57) >  EXAM:  CT LUMBAR SPINE IC                          PROCEDURE DATE:  2019          INTERPRETATION:  PROCEDURE: CT Lumbar spine with contrast    INDICATION: Lower back pain, fever, evaluate for abscess. Elevated white   count and CRP.    TECHNIQUE:  Multiple axial sections were obtained from the thoracolumbar   junction through the sacrum. Sagittal and coronal reformats were obtained   from the axial data set. The images were reviewed in soft tissue and bone   windows. 95 cc of Optiray 350 was administered intravenously. 5 cc was   discarded.    COMPARISON: CT lumbar spine without contrast from same day    FINDINGS:     There is a greater degree of irregularity of the endplates at the T12-L1   level compared to other levels with subchondral sclerosis and anterior   widening of the intervertebral disc space compartment with abnormal   infiltration of the prevertebral fat, and findings may represent early   discitis and/or osteomyelitis. MR could be obtained for further   characterization if cleared by cardiology given the patient has   pacemaker. No discrete epidural abscess is seen albeit evaluation is   limited on CT.    Incidental note is made of a fat-containing lesion in the tail the   pancreas measuring up to 1.1 cm.  Incidental note is made of Musa catheter.    The patient demonstrates prior posterior spinal fusion from L4 to S1 with   bilateral vertical rods securing bilateral transpedicular screws at L4,   L5, and S1 with laminectomy L2-S1 as well as a additional transverse bar   at the L4-L5 level. No evidence of hardware loosening or fracture.     On the coronal reformations, there is curvature in the lower lumbar   spine, convex to the left, apex at L4 and compensatory curvature in the   upper lumbar spine, convex to the right.     On the sagittal reformations, there is anterolisthesis of L4 with respect   to L3, L3 with respect to L2, and L2 with respect to L1 on the basis of   degenerative change.     Multilevel degenerative osteoarthritis is present. Findings include   marginal endplate osteophytes, particularly anteriorly, facet joint   hypertrophy osteophytes, and ligamentum flavum thickening. Multilevel   degenerative disc disease is present. Findings include loss of disc space   height and endplate sclerosis. There is vacuum phenomenon at the L2-L3   and L3-L4 levels as well as the T12-L1 level. There is diffuse   osteopenia.     No lucent fracture line is seen.     At the T12/L1 level, there is a diffuse disc bulge with mild ventral   effacement of thecal sac and central canal stenosis. There is no   significant neural foraminal narrowing.     At the L1/2 level, there is a diffuse disc bulge with marked ventral   effacement of the thecal sac resulting in moderate to severe central   canal stenosis. There is foraminal narrowing bilaterally and lateral   recess stenosis bilaterally.     At the L2/3 level, there is a diffuse disc bulge and there may be more   focal, partially calcified, right paracentral disc protrusion. There is   effacement of ventral thecal sac. There is moderate to severe foraminal   narrowing bilaterally and lateral recess stenosis bilaterally.     At the L3/4 level, there is diffuse disc bulge. There is moderate to   severe foraminal narrowing bilaterally and lateral recess stenosis   bilaterally.     At the L4/5 level, there is fusion across the disc space. There is   foraminal narrowing bilaterally.     At the L5/S1 level, there is fusion across the disc space. There is   foraminal narrowing bilaterally.     IMPRESSION:     1. Findings concerning for early discitis and/or osteomyelitis at T12-L1   level with no definitive epidural abscess identified albeit evaluation is   limited on CT. MR may be helpful for further characterization if cleared   clinically given presence of pacemaker.   2. Prior posterior spinal fusion from L4 to S1 with no hardware   malfunction   and prior L2-S1 laminectomy.   3. 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   4. 1.1cmfat-containing lesion tail of pancreas, unchanged from prior   abdominal CT of 2017.        < from: MR Lumbar Spine w/ IV Cont (19 @ 19:01) >    EXAM:  MR SPINE LUMBAR IC                          PROCEDURE DATE:  2019          INTERPRETATION:  I, Kevyn Camargo MD, have reviewed the images and the   report and agree with the findings, with the following modification:     Correlation is made with the patient's prior CT abdomen pelvis from   2017 and CT's lumbar spines from 2019.    When compared to the 2017 CT, there has been interval widening of   the T12/L1 disc as well as widening of the large anterior bridging  osteophyte. There has been near-complete resolution of the diffuse gas   within the disc space. There are a few gas bubbles within the anterior   osteophyte (series 8 image 37).     The MRI demonstrates avid hyperintense T2/STIR signal within the widened   disc space and extending into the anterior osteophyte. There is mild   hyperintense STIR signal especially along the inferior endplate of L1.   There is enhancement especially along the inferior endplate of L1.   However, no definite enhancement is identified within the disc space. The   lack of enhancement within the disc would be somewhat atypical for   spondylodiscitis. The replacement of intradiscal vacuum phenomenon by   intradiscal fluid can be seen in degenerative disks and may be the   etiology given the altered biomechanics from the posterior spinal fusion   and laminectomy.    The patient is status post posterior fusion with lateral mass rods and   transpedicular screws at the L3 through the S1 levels. A horizontal bebeto   is also present at the L4/5 disc space. The orthopedic hardware is better   assessed on the patient's prior study. Metallic artifact from the   patient's orthopedic hardware obscures fine detail. The patient is also   status post laminectomy at the L2 tothe L5 levels. There are   postsurgical changes within the midline paraspinal soft tissues. No fluid   collection or abscess is identified.    At the L1/2 level, there is osseous ridging with disc bulge  indenting on   the thecal sac. There is moderate right neural foramen narrowing.    At the L2/3 level there is osseous ridging with disc bulge eccentric to   the right indenting on the thecal sac. There is moderate bilateral neural   foramen narrowing.    At the L3/4 level there is uncovering of the intervertebral disc space   with diffuse osseous ridging indenting the thecal sac. There is severe   bilateral neural foramen narrowing.    [facility] PRELIMINARY RADIOLOGY REPORT    Call: [phone] assistance Online chat:   [email or web address]  Patient Name: AMADA MARLEY MRN: [id number]   (Age): 1928 89 Gender: M  Date of Exam: 2019 Accession: [id number]  Referring Physician: LEANNE CARDOZO # of Images: [id number]  Ordered As: MR SPINE LUMBAR WO & W    EXAM: MR Lumbar Spine Without and With Contrast.  EXAM DATE/TIME: 2019 5:58 PM  CLINICAL HISTORY: 89 years old, male; Signs and symptoms; Weakness; Prior   surgery; Patient HX: Lower extremity  weakness, fever. Evaluate for epidural abscess.  TECHNIQUE:  Imaging protocol: Multiplanar magnetic resonance images of the lumbar   spine without and with intravenous contrast. 7.5 cc of Gadavist was   administered intravenously. 0 cc was discarded.  COMPARISON: CT LUMBAR SPINE WITH IV CONTRAST 2019 2:47 PM    FINDINGS:  Abnormal signal at T12-L1 disc with fluid expanding the disc space (i.e.   lining of the disc space, especially anteriorly). No associated   significant endplate erosion or marrow edema. Findings are again   concerning for discitis at T12-L1 (without significant osteomyelitis at   this time). However, due to atypical MRI presentation of this process   (primarily, lack of endplate erosive changes and marrow edema), this   finding could alternatively represent traumatic disc distraction or,   least likely, sequela of chronic degenerative changes. Correlation with   recent history of trauma to the thoracolumbar junction and also prior   imaging (if available on-site or at outside institutions) would be   helpful. No epidural abscess or paraspinal phlegmon.  Redemonstration of advanced multilevel spondylosis and multilevel   posterior fusion. No hardware failure. No acute fracture or dislocation.   Canal stenosis is moderate at L5-S1. Otherwise, no highgrade canal   stenosis. Moderate to severe multilevel bilateral foraminal narrowing   also redemonstrated. Please see subsequent final report for details.    IMPRESSION: Abnormal signal at T12-L1 disc with disc space expansion,   without associated endplate erosion or marrow edema. Differential   diagnoses as above.              Vital Signs Last 24 Hrs  T(C): 36.6 (15 Apr 2019 08:54), Max: 37.3 (2019 16:31)  T(F): 97.8 (15 Apr 2019 08:54), Max: 99.1 (2019 16:31)  HR: 82 (15 Apr 2019 11:19) (63 - 96)  BP: 140/82 (15 Apr 2019 11:19) (140/82 - 164/92)  BP(mean): --  RR: 17 (15 Apr 2019 08:54) (17 - 20)  SpO2: 97% (15 Apr 2019 11:19) (92% - 97%)    REVIEW OF SYSTEMS:    CONSTITUTIONAL: No fever, weight loss, or fatigue  EYES: No eye pain, visual disturbances, or discharge  ENMT:  No difficulty hearing, tinnitus, vertigo; No sinus or throat pain  NECK: No pain or stiffness  BREASTS: No pain, masses, or nipple discharge  RESPIRATORY: No cough, wheezing, chills or hemoptysis; No shortness of breath  CARDIOVASCULAR: No chest pain, palpitations, dizziness, or leg swelling  GASTROINTESTINAL: No abdominal or epigastric pain. No nausea, vomiting, or hematemesis; No diarrhea or constipation. No melena or hematochezia.  GENITOURINARY: No dysuria, frequency, hematuria, or incontinence  NEUROLOGICAL: No headaches, memory loss, loss of strength, numbness, or tremors  SKIN: No itching, burning, rashes, or lesions   LYMPH NODES: No enlarged glands  ENDOCRINE: No heat or cold intolerance; No hair loss  MUSCULOSKELETAL: back pain  PSYCHIATRIC: No depression, anxiety, mood swings, or difficulty sleeping  HEME/LYMPH: No easy bruising, or bleeding gums  ALLERGY AND IMMUNOLOGIC: No hives or eczema  VASCULAR: no swelling, erythema      Physical Exam: 89 yo  gentleman lying in semi Marrero's position, "feels better"    Head: normocephalic, atraumatic    Eyes: PERRLA, EOMI, no nystagmus, sclera anicteric    ENT: nasal discharge, uvula midline, no oropharyngeal erythema/exudate    Neck: supple, negative JVD, negative carotid bruits, no thyromegaly    Chest: CTA bilaterally, neg wheeze, rhonchi, rales, crackles, egophany    Cardiovascular: regular rate and rhythm, neg murmurs/rubs/gallops    Abdomen: soft, non distended, non tender, negative rebound/guarding, normal bowel sounds, neg hepatosplenomegaly    Extremities: WWP, neg cyanosis/clubbing/edema, negative calf tenderness to palpation, negative Cash's sign    Spine: L3-5 TTP, neg spasms, neg SLR    :     Neurologic Exam:    Alert and oriented x 1 , speech fluent w/o dysarthria    Cranial Nerves:     II:                       pupils equal, round and reactive to light, visual fields intact   III/ IV/VI:            extraocular movements intact, neg nystagmus, ptosis  V:                       facial sensation intact, V1-3 normal  VII:                     face symmetric, no droop, normal eye closure and smile  VIII:                    hearing intact to finger rub bilaterally  IX/ X:                 soft palate rise symmetrical  XI:                      head turning, shoulder shrug normal  XII:                     tongue midline    Motor Exam:    Upper Extremities:     RIght:   5/5   /intrinsics               5/5  wrist flexors/extensors               5/5  biceps/triceps               5/5  deltoid               negative drift    Left :     5/5  /intrinsics               5/5  wrist flexors/extensors               5/5 biceps/triceps               5/5 deltoid               negative drift    Lower Extremities:                 Right:     4/5  DF/PF/ evertors/ invertors                4/5  Quad/ hamstrings                3+ to 4-/5  hip flexors/adductors/abductors                Left:       4/5  DF/PF/ evertors/ invertors                4/5  Quad/ hamstrings                   3+ to 4-/5  hip flexors/adductors/abductors      Sensory:    intact to LT/PP in all UE/LE dermatomes    DTR:            = biceps/     triceps/     brachioradialis                      = patella/   medial hamstring/ankle                      neg clonus                      neg Babinski                      neg Hoffmans      Gait:  not tested        PM&R Impression:    1) deconditioned  2) LE weakness  3) lumbar myofascial pain   3) disciitis/ rhabdomyolysis/ sepsis        Recommendations:    1) Physical therapy focusing on therapeutic exercises, bed mobility/transfer out of bed evaluation, progressive ambulation with assistive devices prn.    2) Anticipated Disposition Plan/Recs: subacute rehab placement

## 2019-04-15 NOTE — PHYSICAL THERAPY INITIAL EVALUATION ADULT - PERTINENT HX OF CURRENT PROBLEM, REHAB EVAL
Pt is a 89 y.o male who was admitted with LE weakness. Pt has PMHx of LBP, L/S fusion, glaucoma, and HTN. Pt exhibits significant weakness, difficulty walking, and limited ability to transfer.

## 2019-04-15 NOTE — PROGRESS NOTE ADULT - SUBJECTIVE AND OBJECTIVE BOX
Orthopaedic Spine Resident Note    S: 89y Male no acute overnight events.  Pain controlled.  Appears to be improving on exam with LE strength noticibly improved    O:  Vital Signs Last 24 Hrs  T(C): 36.3 (15 Apr 2019 05:24), Max: 37.3 (14 Apr 2019 16:31)  T(F): 97.4 (15 Apr 2019 05:24), Max: 99.1 (14 Apr 2019 16:31)  HR: 82 (15 Apr 2019 05:24) (63 - 96)  BP: 144/79 (15 Apr 2019 05:24) (144/79 - 164/92)  BP(mean): --  RR: 19 (15 Apr 2019 05:24) (18 - 20)  SpO2: 95% (15 Apr 2019 05:24) (92% - 95%)    General: NAD, alert & oriented x 3  TTP over spine midline over thoracolumbar junction  BL LE    Motor: 5/5 GS/TA/EHL/FHL    RLE: 4/5 hip flexors,4/5 knee flexion/extension  LLE: 4/5 hip flexors, 4/5 knee flexion/ extension  Able to actively straight leg raise  Sensation: SILT s/sp/dp/tib  Pulses:  DP/PT 2+ , toes/foot WWP  Rectal tone normal            A/P: 89y Male with sepsis, disciitis   -exam improving, continue IV antibiotics per ID  - no need for IR biopsy anymore as diagnosis has been established  -follow up cultures  -trend WBC, ESR, CRP  -PT: WBAT  -Pain control  Guero Miller MD, PGY-2  Ortho pager [phone]

## 2019-04-15 NOTE — PHYSICAL THERAPY INITIAL EVALUATION ADULT - GAIT DEVIATIONS NOTED, PT EVAL
decreased step length/decreased velocity of limb motion/decreased weight-shifting ability/decreased osito

## 2019-04-15 NOTE — CONSULT NOTE ADULT - ASSESSMENT
89M with PMH of SSS s/p PPM, HTN, Glaucoma in both eyes (multiple surgeries at [facility]), pyogenic granuloma of eyelid s/p surgery, dementia presenting for b/l LE weakness and back pain in setting of altered mental status. Patient admitted to [facility] for metabolic encephalopathy, Rhabdomyolysis and severe sepsis- concerning for discitis.      Problem/Plan - 1:  ·  Problem: Severe sepsis.  Plan: P/w Severe sepsis, T 102F, Leukocytosis 26, reported tachypnea. HR 90s. Lactate elevation to 4.9-> 3.2 -> 1.8. . CRP 20. ESR 30. MRI findings  concerning for discitis and small epidural abscess of T12/L1 No associated significant endplate erosion or marrow edema. Findings are again concerning for discitis at T12-L1 (without significant osteomyelitis at this time). s/p 2.3L NS in the ED.  -F/u BCx - MSSA  - c/w Cefepime (Pending ID approval)  - ID on board  - F/u Ortho spine - aware, no surgical intervention  - Plan for TTE given MSSA bacteremia to rule out Endocarditis.     Problem/Plan - 2:  ·  Problem: Metabolic encephalopathy.  Plan: Most likely etiology secondary to ongoing infection- considering patient came in with severe sepsis. With resuscitation patient able to better respond to questions.  - Infection: c/w Cefepime for MSSA bacteremia likely priomary cause of encephalopathy  - Vascular: CTH negative for acute intracranial hemorrhage or infarct but noted chronic microangiopathic disease including bilateral lacunar infarcts, If no   - Vitamin B12 and Folate WNL.     Problem/Plan - 3:  ·  Problem: Rhabdomyolysis.  Plan: Has not been walking much per patient. CK at 7744 downtrending on AM labs  - Discontinued, unknown EF, will continue to monitor fluid status.     Problem/Plan - 4:  ·  Problem: Transaminitis.  Plan: /ALT 47, initial TBili 1.6 with Alk Phos 182, likely in setting of Rhabdo cause of transaminitis.  -c/w monitoring LFTs  -Hep panel negative.     Problem/Plan - 5:  ·  Problem: Back pain.  Plan: Noted back pain on presentation. Initial CT concerning for findings of developing osteo or discitis. MRI with small epidural abscess and discitis. past surgical hx of posterior spinal fusion from L4 to S1 and prior L2-S1 laminectomy, found to have disc bulging T12 L1 on MRI likely source of pain  - F/u Ortho-spine - no intervention at this time  - Neuro recs.     Problem/Plan - 6:  Problem: Essential hypertension. Plan: Hx of HTN on amlodipine and losartan  - Hold in setting of sepsis  - Plan to restart as BP tolerates.    Problem/Plan - 7:  ·  Problem: HLD (hyperlipidemia).  Plan: Hx of HLD on home Lipitor  -Hold lipitor in setting of rhabdo.     Problem/Plan - 8:  ·  Problem: Sick sinus syndrome.  Plan: Hx of SSS s/p PPM, PPM interrogated on presentation and changed into MRI mode to evaluate back.

## 2019-04-16 ENCOUNTER — TRANSCRIPTION ENCOUNTER (OUTPATIENT)
Age: 84
End: 2019-04-16

## 2019-04-16 LAB
-  CEFAZOLIN: SIGNIFICANT CHANGE UP
-  CEFAZOLIN: SIGNIFICANT CHANGE UP
-  CLINDAMYCIN: SIGNIFICANT CHANGE UP
-  CLINDAMYCIN: SIGNIFICANT CHANGE UP
-  ERYTHROMYCIN: SIGNIFICANT CHANGE UP
-  ERYTHROMYCIN: SIGNIFICANT CHANGE UP
-  LINEZOLID: SIGNIFICANT CHANGE UP
-  LINEZOLID: SIGNIFICANT CHANGE UP
-  OXACILLIN: SIGNIFICANT CHANGE UP
-  OXACILLIN: SIGNIFICANT CHANGE UP
-  RIFAMPIN: SIGNIFICANT CHANGE UP
-  RIFAMPIN: SIGNIFICANT CHANGE UP
-  TRIMETHOPRIM/SULFAMETHOXAZOLE: SIGNIFICANT CHANGE UP
-  TRIMETHOPRIM/SULFAMETHOXAZOLE: SIGNIFICANT CHANGE UP
-  VANCOMYCIN: SIGNIFICANT CHANGE UP
-  VANCOMYCIN: SIGNIFICANT CHANGE UP
ALBUMIN SERPL ELPH-MCNC: 2.4 G/DL — LOW (ref 3.3–5)
ALBUMIN SERPL ELPH-MCNC: 2.6 G/DL — LOW (ref 3.3–5)
ALP SERPL-CCNC: 92 U/L — SIGNIFICANT CHANGE UP (ref 40–120)
ALP SERPL-CCNC: 92 U/L — SIGNIFICANT CHANGE UP (ref 40–120)
ALT FLD-CCNC: 66 U/L — HIGH (ref 10–45)
ALT FLD-CCNC: 68 U/L — HIGH (ref 10–45)
ANION GAP SERPL CALC-SCNC: 10 MMOL/L — SIGNIFICANT CHANGE UP (ref 5–17)
ANION GAP SERPL CALC-SCNC: 14 MMOL/L — SIGNIFICANT CHANGE UP (ref 5–17)
AST SERPL-CCNC: 103 U/L — HIGH (ref 10–40)
AST SERPL-CCNC: 122 U/L — HIGH (ref 10–40)
BASOPHILS # BLD AUTO: 0 K/UL — SIGNIFICANT CHANGE UP (ref 0–0.2)
BASOPHILS NFR BLD AUTO: 0 % — SIGNIFICANT CHANGE UP (ref 0–2)
BILIRUB DIRECT SERPL-MCNC: 0.4 MG/DL — HIGH (ref 0–0.2)
BILIRUB DIRECT SERPL-MCNC: <0.2 MG/DL — SIGNIFICANT CHANGE UP (ref 0–0.2)
BILIRUB INDIRECT FLD-MCNC: 0.7 MG/DL — SIGNIFICANT CHANGE UP (ref 0.2–1)
BILIRUB INDIRECT FLD-MCNC: >0.5 MG/DL — SIGNIFICANT CHANGE UP (ref 0.2–1)
BILIRUB SERPL-MCNC: 0.7 MG/DL — SIGNIFICANT CHANGE UP (ref 0.2–1.2)
BILIRUB SERPL-MCNC: 1.1 MG/DL — SIGNIFICANT CHANGE UP (ref 0.2–1.2)
BUN SERPL-MCNC: 25 MG/DL — HIGH (ref 7–23)
BUN SERPL-MCNC: 39 MG/DL — HIGH (ref 7–23)
CALCIUM SERPL-MCNC: 8.6 MG/DL — SIGNIFICANT CHANGE UP (ref 8.4–10.5)
CALCIUM SERPL-MCNC: 8.9 MG/DL — SIGNIFICANT CHANGE UP (ref 8.4–10.5)
CHLORIDE SERPL-SCNC: 108 MMOL/L — SIGNIFICANT CHANGE UP (ref 96–108)
CHLORIDE SERPL-SCNC: 108 MMOL/L — SIGNIFICANT CHANGE UP (ref 96–108)
CK SERPL-CCNC: 860 U/L — HIGH (ref 30–200)
CO2 SERPL-SCNC: 19 MMOL/L — LOW (ref 22–31)
CO2 SERPL-SCNC: 23 MMOL/L — SIGNIFICANT CHANGE UP (ref 22–31)
CREAT SERPL-MCNC: 0.56 MG/DL — SIGNIFICANT CHANGE UP (ref 0.5–1.3)
CREAT SERPL-MCNC: 1.08 MG/DL — SIGNIFICANT CHANGE UP (ref 0.5–1.3)
CULTURE RESULTS: SIGNIFICANT CHANGE UP
CULTURE RESULTS: SIGNIFICANT CHANGE UP
EOSINOPHIL # BLD AUTO: 0 K/UL — SIGNIFICANT CHANGE UP (ref 0–0.5)
EOSINOPHIL NFR BLD AUTO: 0 % — SIGNIFICANT CHANGE UP (ref 0–6)
GLUCOSE BLDC GLUCOMTR-MCNC: 135 MG/DL — HIGH (ref 70–99)
GLUCOSE BLDC GLUCOMTR-MCNC: 146 MG/DL — HIGH (ref 70–99)
GLUCOSE BLDC GLUCOMTR-MCNC: 190 MG/DL — HIGH (ref 70–99)
GLUCOSE BLDC GLUCOMTR-MCNC: 197 MG/DL — HIGH (ref 70–99)
GLUCOSE SERPL-MCNC: 135 MG/DL — HIGH (ref 70–99)
GLUCOSE SERPL-MCNC: 209 MG/DL — HIGH (ref 70–99)
GRAM STN FLD: SIGNIFICANT CHANGE UP
HCT VFR BLD CALC: 37.6 % — LOW (ref 39–50)
HCT VFR BLD CALC: 38.6 % — LOW (ref 39–50)
HGB BLD-MCNC: 12.6 G/DL — LOW (ref 13–17)
HGB BLD-MCNC: 12.6 G/DL — LOW (ref 13–17)
LACTATE SERPL-SCNC: 1.8 MMOL/L — SIGNIFICANT CHANGE UP (ref 0.5–2)
LACTATE SERPL-SCNC: 2.5 MMOL/L — HIGH (ref 0.5–2)
LYMPHOCYTES # BLD AUTO: 0.61 K/UL — LOW (ref 1–3.3)
LYMPHOCYTES # BLD AUTO: 3.5 % — LOW (ref 13–44)
MAGNESIUM SERPL-MCNC: 2.3 MG/DL — SIGNIFICANT CHANGE UP (ref 1.6–2.6)
MCHC RBC-ENTMCNC: 29.6 PG — SIGNIFICANT CHANGE UP (ref 27–34)
MCHC RBC-ENTMCNC: 30.3 PG — SIGNIFICANT CHANGE UP (ref 27–34)
MCHC RBC-ENTMCNC: 32.6 GM/DL — SIGNIFICANT CHANGE UP (ref 32–36)
MCHC RBC-ENTMCNC: 33.5 GM/DL — SIGNIFICANT CHANGE UP (ref 32–36)
MCV RBC AUTO: 90.4 FL — SIGNIFICANT CHANGE UP (ref 80–100)
MCV RBC AUTO: 90.8 FL — SIGNIFICANT CHANGE UP (ref 80–100)
METHOD TYPE: SIGNIFICANT CHANGE UP
METHOD TYPE: SIGNIFICANT CHANGE UP
MONOCYTES # BLD AUTO: 1.07 K/UL — HIGH (ref 0–0.9)
MONOCYTES NFR BLD AUTO: 6.1 % — SIGNIFICANT CHANGE UP (ref 2–14)
NEUTROPHILS # BLD AUTO: 15.88 K/UL — HIGH (ref 1.8–7.4)
NEUTROPHILS NFR BLD AUTO: 58.2 % — SIGNIFICANT CHANGE UP (ref 43–77)
NRBC # BLD: 0 /100 WBCS — SIGNIFICANT CHANGE UP (ref 0–0)
ORGANISM # SPEC MICROSCOPIC CNT: SIGNIFICANT CHANGE UP
PHOSPHATE SERPL-MCNC: 1.3 MG/DL — LOW (ref 2.5–4.5)
PLATELET # BLD AUTO: 125 K/UL — LOW (ref 150–400)
PLATELET # BLD AUTO: 154 K/UL — SIGNIFICANT CHANGE UP (ref 150–400)
POTASSIUM SERPL-MCNC: 3.7 MMOL/L — SIGNIFICANT CHANGE UP (ref 3.5–5.3)
POTASSIUM SERPL-MCNC: 4 MMOL/L — SIGNIFICANT CHANGE UP (ref 3.5–5.3)
POTASSIUM SERPL-SCNC: 3.7 MMOL/L — SIGNIFICANT CHANGE UP (ref 3.5–5.3)
POTASSIUM SERPL-SCNC: 4 MMOL/L — SIGNIFICANT CHANGE UP (ref 3.5–5.3)
PROT SERPL-MCNC: 5.8 G/DL — LOW (ref 6–8.3)
PROT SERPL-MCNC: 6.1 G/DL — SIGNIFICANT CHANGE UP (ref 6–8.3)
RBC # BLD: 4.16 M/UL — LOW (ref 4.2–5.8)
RBC # BLD: 4.25 M/UL — SIGNIFICANT CHANGE UP (ref 4.2–5.8)
RBC # FLD: 14.3 % — SIGNIFICANT CHANGE UP (ref 10.3–14.5)
RBC # FLD: 14.3 % — SIGNIFICANT CHANGE UP (ref 10.3–14.5)
SODIUM SERPL-SCNC: 141 MMOL/L — SIGNIFICANT CHANGE UP (ref 135–145)
SODIUM SERPL-SCNC: 141 MMOL/L — SIGNIFICANT CHANGE UP (ref 135–145)
SPECIMEN SOURCE: SIGNIFICANT CHANGE UP
SPECIMEN SOURCE: SIGNIFICANT CHANGE UP
WBC # BLD: 17.57 K/UL — HIGH (ref 3.8–10.5)
WBC # BLD: 18.75 K/UL — HIGH (ref 3.8–10.5)
WBC # FLD AUTO: 17.57 K/UL — HIGH (ref 3.8–10.5)
WBC # FLD AUTO: 18.75 K/UL — HIGH (ref 3.8–10.5)

## 2019-04-16 PROCEDURE — 99232 SBSQ HOSP IP/OBS MODERATE 35: CPT

## 2019-04-16 PROCEDURE — 71045 X-RAY EXAM CHEST 1 VIEW: CPT | Mod: 26

## 2019-04-16 PROCEDURE — 99232 SBSQ HOSP IP/OBS MODERATE 35: CPT | Mod: GC

## 2019-04-16 PROCEDURE — 99233 SBSQ HOSP IP/OBS HIGH 50: CPT | Mod: GC

## 2019-04-16 PROCEDURE — 74018 RADEX ABDOMEN 1 VIEW: CPT | Mod: 26

## 2019-04-16 RX ORDER — POLYETHYLENE GLYCOL 3350 17 G/17G
17 POWDER, FOR SOLUTION ORAL DAILY
Qty: 0 | Refills: 0 | Status: DISCONTINUED | OUTPATIENT
Start: 2019-04-16 | End: 2019-04-18

## 2019-04-16 RX ORDER — POTASSIUM PHOSPHATE, MONOBASIC POTASSIUM PHOSPHATE, DIBASIC 236; 224 MG/ML; MG/ML
30 INJECTION, SOLUTION INTRAVENOUS ONCE
Qty: 0 | Refills: 0 | Status: COMPLETED | OUTPATIENT
Start: 2019-04-16 | End: 2019-04-16

## 2019-04-16 RX ORDER — SODIUM CHLORIDE 9 MG/ML
250 INJECTION INTRAMUSCULAR; INTRAVENOUS; SUBCUTANEOUS ONCE
Qty: 0 | Refills: 0 | Status: COMPLETED | OUTPATIENT
Start: 2019-04-16 | End: 2019-04-16

## 2019-04-16 RX ORDER — SENNA PLUS 8.6 MG/1
2 TABLET ORAL AT BEDTIME
Qty: 0 | Refills: 0 | Status: DISCONTINUED | OUTPATIENT
Start: 2019-04-16 | End: 2019-04-18

## 2019-04-16 RX ORDER — ACETAMINOPHEN 500 MG
650 TABLET ORAL EVERY 6 HOURS
Qty: 0 | Refills: 0 | Status: DISCONTINUED | OUTPATIENT
Start: 2019-04-16 | End: 2019-04-18

## 2019-04-16 RX ORDER — DOCUSATE SODIUM 100 MG
100 CAPSULE ORAL THREE TIMES A DAY
Qty: 0 | Refills: 0 | Status: DISCONTINUED | OUTPATIENT
Start: 2019-04-16 | End: 2019-04-18

## 2019-04-16 RX ORDER — IPRATROPIUM/ALBUTEROL SULFATE 18-103MCG
3 AEROSOL WITH ADAPTER (GRAM) INHALATION EVERY 4 HOURS
Qty: 0 | Refills: 0 | Status: DISCONTINUED | OUTPATIENT
Start: 2019-04-16 | End: 2019-04-22

## 2019-04-16 RX ADMIN — Medication 1: at 22:11

## 2019-04-16 RX ADMIN — Medication 1 DROP(S): at 17:45

## 2019-04-16 RX ADMIN — Medication 650 MILLIGRAM(S): at 16:36

## 2019-04-16 RX ADMIN — Medication 2000 MILLIGRAM(S): at 17:53

## 2019-04-16 RX ADMIN — Medication 3 MILLILITER(S): at 14:41

## 2019-04-16 RX ADMIN — POLYETHYLENE GLYCOL 3350 17 GRAM(S): 17 POWDER, FOR SOLUTION ORAL at 14:42

## 2019-04-16 RX ADMIN — SENNA PLUS 2 TABLET(S): 8.6 TABLET ORAL at 22:11

## 2019-04-16 RX ADMIN — POTASSIUM PHOSPHATE, MONOBASIC POTASSIUM PHOSPHATE, DIBASIC 83.33 MILLIMOLE(S): 236; 224 INJECTION, SOLUTION INTRAVENOUS at 09:00

## 2019-04-16 RX ADMIN — SODIUM CHLORIDE 1000 MILLILITER(S): 9 INJECTION INTRAMUSCULAR; INTRAVENOUS; SUBCUTANEOUS at 17:39

## 2019-04-16 RX ADMIN — Medication 100 MILLIGRAM(S): at 22:11

## 2019-04-16 RX ADMIN — Medication 3 MILLILITER(S): at 07:30

## 2019-04-16 RX ADMIN — Medication 3 MILLILITER(S): at 22:11

## 2019-04-16 RX ADMIN — HEPARIN SODIUM 5000 UNIT(S): 5000 INJECTION INTRAVENOUS; SUBCUTANEOUS at 05:58

## 2019-04-16 RX ADMIN — HEPARIN SODIUM 5000 UNIT(S): 5000 INJECTION INTRAVENOUS; SUBCUTANEOUS at 22:11

## 2019-04-16 RX ADMIN — Medication 3 MILLILITER(S): at 01:56

## 2019-04-16 RX ADMIN — Medication 650 MILLIGRAM(S): at 17:45

## 2019-04-16 RX ADMIN — Medication 1 DROP(S): at 05:58

## 2019-04-16 RX ADMIN — Medication 2000 MILLIGRAM(S): at 01:56

## 2019-04-16 RX ADMIN — HEPARIN SODIUM 5000 UNIT(S): 5000 INJECTION INTRAVENOUS; SUBCUTANEOUS at 14:40

## 2019-04-16 RX ADMIN — Medication 3 MILLILITER(S): at 17:37

## 2019-04-16 RX ADMIN — Medication 2000 MILLIGRAM(S): at 09:55

## 2019-04-16 RX ADMIN — Medication 1: at 17:38

## 2019-04-16 NOTE — PROGRESS NOTE ADULT - ASSESSMENT
89M with PMH of SSS s/p PPM, L4-S1 post spinal fusion, L2-S1 laminectomy, HTN, Glaucoma, pyogenic granuloma of eyelid s/p surgery, dementia presenting for b/l LE weakness and back pain, found to have T12-L1 discitis and MSSA BSI.      - surveillance bcx drawn on 4/15 - NGTD  - pending TTE today to r/o endocarditis   - c/w cefazolin 2g IV q8h  - +/- adjuvant rifampin for biofilm penetration given hardware    ID Team 1 will follow.     INCOMPLETE NOTE 89M with PMH of SSS s/p PPM, L4-S1 post spinal fusion, L2-S1 laminectomy, HTN, Glaucoma, pyogenic granuloma of eyelid s/p surgery, dementia presenting for b/l LE weakness and back pain, found to have T12-L1 discitis and MSSA BSI.      - surveillance bcx drawn on 4/15 - NGTD  - TTE with AV thickening, could not visualize right heart adequately   - c/w cefazolin 2g IV q8h  - add rifampin 600mg PO q24h for biofilm penetration given hardware  - patient will need weekly CBC, CMP, ESR, CRP (please fax to Dr. Ann [phone])  - patient to follow up with Dr. Ann  - continue to monitor surveillance cx for any new growth    ID to signoff 89M with PMH of SSS s/p PPM, L4-S1 post spinal fusion, L2-S1 laminectomy, HTN, Glaucoma, pyogenic granuloma of eyelid s/p surgery, dementia presenting for b/l LE weakness and back pain, found to have T12-L1 discitis and MSSA BSI.      - surveillance bcx drawn on 4/15 - NGTD  - TTE with AV thickening, could not visualize right heart adequately; would defer SUSANA as unlikely to alter antibiotic management    - c/w cefazolin 2g IV q8h  - add rifampin 600mg PO q24h for biofilm penetration given hardware  - continue above regimen for at least 6 weeks through 5/26/19  - patient will need weekly CBC, CMP, ESR, CRP (please fax to Dr. Ann [phone])  - patient to follow up with Dr. Ann  - continue to monitor surveillance cx for any new growth

## 2019-04-16 NOTE — PROGRESS NOTE ADULT - PROBLEM SELECTOR PLAN 2
Most likely etiology secondary to ongoing infection- considering patient came in with severe sepsis. With resuscitation patient able to better respond to questions.  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

## 2019-04-16 NOTE — PROGRESS NOTE ADULT - SUBJECTIVE AND OBJECTIVE BOX
INTERVAL HPI/OVERNIGHT EVENTS:    SUBJECTIVE: Patient seen and examined at bedside.    OBJECTIVE:    VITAL SIGNS:  ICU Vital Signs Last 24 Hrs  T(C): 37.2 (16 Apr 2019 08:55), Max: 37.2 (16 Apr 2019 08:55)  T(F): 98.9 (16 Apr 2019 08:55), Max: 98.9 (16 Apr 2019 08:55)  HR: 89 (16 Apr 2019 08:55) (82 - 95)  BP: 145/80 (15 Apr 2019 20:37) (145/80 - 153/91)  BP(mean): --  ABP: --  ABP(mean): --  RR: 16 (16 Apr 2019 08:55) (16 - 20)  SpO2: 92% (16 Apr 2019 08:55) (92% - 95%)        04-15 @ 07:01  -  04-16 @ 07:00  --------------------------------------------------------  IN: 0 mL / OUT: 750 mL / NET: -750 mL      CAPILLARY BLOOD GLUCOSE      POCT Blood Glucose.: 146 mg/dL (16 Apr 2019 12:04)      PHYSICAL EXAM:    General: WDWN ; NAD  HEENT: NC/AT; PERRL, anicteric sclera  Neck: supple, no JVD  Respiratory: CTA B/L; no W/R/R  Cardiovascular: +S1/S2; RRR; no M/R/G  Gastrointestinal: soft, NT/ND; +BS x4  Extremities: WWP; 2+ peripheral pulses B/L; no LE edema  Skin: normal color and turgor; no rash  Neurological:     MEDICATIONS:  MEDICATIONS  (STANDING):  ALBUTerol/ipratropium for Nebulization 3 milliLiter(s) Nebulizer every 6 hours  ceFAZolin  Injectable. 2000 milliGRAM(s) IV Push every 8 hours  dextrose 5%. 1000 milliLiter(s) (50 mL/Hr) IV Continuous <Continuous>  dextrose 50% Injectable 12.5 Gram(s) IV Push once  dextrose 50% Injectable 25 Gram(s) IV Push once  dextrose 50% Injectable 25 Gram(s) IV Push once  heparin  Injectable 5000 Unit(s) SubCutaneous every 8 hours  insulin lispro (HumaLOG) corrective regimen sliding scale   SubCutaneous Before meals and at bedtime  potassium phosphate IVPB 30 milliMole(s) IV Intermittent once  timolol 0.5% Solution 1 Drop(s) Both EYES every 12 hours    MEDICATIONS  (PRN):  dextrose 40% Gel 15 Gram(s) Oral once PRN Blood Glucose LESS THAN 70 milliGRAM(s)/deciliter  glucagon  Injectable 1 milliGRAM(s) IntraMuscular once PRN Glucose LESS THAN 70 milligrams/deciliter      ALLERGIES:  Allergies    No Known Allergies    Intolerances        LABS:                        12.6   18.75 )-----------( 154      ( 16 Apr 2019 06:38 )             38.6     04-16    141  |  108  |  25<H>  ----------------------------<  135<H>  3.7   |  23  |  0.56    Ca    8.9      16 Apr 2019 06:38  Phos  1.3     04-16  Mg     2.3     04-16            CARDIAC MARKERS ( 16 Apr 2019 06:38 )  x     / x     / 860 U/L / x     / x      CARDIAC MARKERS ( 14 Apr 2019 20:37 )  x     / x     / 3872 U/L / x     / x            RADIOLOGY & ADDITIONAL TESTS: Reviewed. INTERVAL HPI/OVERNIGHT EVENTS:      SUBJECTIVE: Patient seen and examined at bedside.    OBJECTIVE:    VITAL SIGNS:  ICU Vital Signs Last 24 Hrs  T(C): 37.2 (16 Apr 2019 08:55), Max: 37.2 (16 Apr 2019 08:55)  T(F): 98.9 (16 Apr 2019 08:55), Max: 98.9 (16 Apr 2019 08:55)  HR: 89 (16 Apr 2019 08:55) (82 - 95)  BP: 145/80 (15 Apr 2019 20:37) (145/80 - 153/91)  BP(mean): --  ABP: --  ABP(mean): --  RR: 16 (16 Apr 2019 08:55) (16 - 20)  SpO2: 92% (16 Apr 2019 08:55) (92% - 95%)        04-15 @ 07:01  -  04-16 @ 07:00  --------------------------------------------------------  IN: 0 mL / OUT: 750 mL / NET: -750 mL      CAPILLARY BLOOD GLUCOSE      POCT Blood Glucose.: 146 mg/dL (16 Apr 2019 12:04)      PHYSICAL EXAM:    General: WDWN ; NAD  HEENT: NC/AT; PERRL, anicteric sclera  Neck: supple, no JVD  Respiratory: CTA B/L; no W/R/R  Cardiovascular: +S1/S2; RRR; no M/R/G  Gastrointestinal: soft, NT/ND; +BS x4  Extremities: WWP; 2+ peripheral pulses B/L; no LE edema  Skin: normal color and turgor; no rash  Neurological:     MEDICATIONS:  MEDICATIONS  (STANDING):  ALBUTerol/ipratropium for Nebulization 3 milliLiter(s) Nebulizer every 6 hours  ceFAZolin  Injectable. 2000 milliGRAM(s) IV Push every 8 hours  dextrose 5%. 1000 milliLiter(s) (50 mL/Hr) IV Continuous <Continuous>  dextrose 50% Injectable 12.5 Gram(s) IV Push once  dextrose 50% Injectable 25 Gram(s) IV Push once  dextrose 50% Injectable 25 Gram(s) IV Push once  heparin  Injectable 5000 Unit(s) SubCutaneous every 8 hours  insulin lispro (HumaLOG) corrective regimen sliding scale   SubCutaneous Before meals and at bedtime  potassium phosphate IVPB 30 milliMole(s) IV Intermittent once  timolol 0.5% Solution 1 Drop(s) Both EYES every 12 hours    MEDICATIONS  (PRN):  dextrose 40% Gel 15 Gram(s) Oral once PRN Blood Glucose LESS THAN 70 milliGRAM(s)/deciliter  glucagon  Injectable 1 milliGRAM(s) IntraMuscular once PRN Glucose LESS THAN 70 milligrams/deciliter      ALLERGIES:  Allergies    No Known Allergies    Intolerances        LABS:                        12.6   18.75 )-----------( 154      ( 16 Apr 2019 06:38 )             38.6     04-16    141  |  108  |  25<H>  ----------------------------<  135<H>  3.7   |  23  |  0.56    Ca    8.9      16 Apr 2019 06:38  Phos  1.3     04-16  Mg     2.3     04-16            CARDIAC MARKERS ( 16 Apr 2019 06:38 )  x     / x     / 860 U/L / x     / x      CARDIAC MARKERS ( 14 Apr 2019 20:37 )  x     / x     / 3872 U/L / x     / x            RADIOLOGY & ADDITIONAL TESTS: Reviewed. INTERVAL HPI/OVERNIGHT EVENTS:     SUBJECTIVE: Patient seen and examined at bedside.    OBJECTIVE:    VITAL SIGNS:  ICU Vital Signs Last 24 Hrs  T(C): 37.2 (16 Apr 2019 08:55), Max: 37.2 (16 Apr 2019 08:55)  T(F): 98.9 (16 Apr 2019 08:55), Max: 98.9 (16 Apr 2019 08:55)  HR: 89 (16 Apr 2019 08:55) (82 - 95)  BP: 145/80 (15 Apr 2019 20:37) (145/80 - 153/91)  BP(mean): --  ABP: --  ABP(mean): --  RR: 16 (16 Apr 2019 08:55) (16 - 20)  SpO2: 92% (16 Apr 2019 08:55) (92% - 95%)        04-15 @ 07:01  -  04-16 @ 07:00  --------------------------------------------------------  IN: 0 mL / OUT: 750 mL / NET: -750 mL      CAPILLARY BLOOD GLUCOSE      POCT Blood Glucose.: 146 mg/dL (16 Apr 2019 12:04)      PHYSICAL EXAM:    General: WDWN ; NAD  HEENT: NC/AT; PERRL, anicteric sclera  Neck: supple, no JVD  Respiratory: CTA B/L; no W/R/R  Cardiovascular: +S1/S2; RRR; no M/R/G  Gastrointestinal: soft, NT/ND; +BS x4  Extremities: WWP; 2+ peripheral pulses B/L; no LE edema  Skin: normal color and turgor; no rash  Neurological:     MEDICATIONS:  MEDICATIONS  (STANDING):  ALBUTerol/ipratropium for Nebulization 3 milliLiter(s) Nebulizer every 6 hours  ceFAZolin  Injectable. 2000 milliGRAM(s) IV Push every 8 hours  dextrose 5%. 1000 milliLiter(s) (50 mL/Hr) IV Continuous <Continuous>  dextrose 50% Injectable 12.5 Gram(s) IV Push once  dextrose 50% Injectable 25 Gram(s) IV Push once  dextrose 50% Injectable 25 Gram(s) IV Push once  heparin  Injectable 5000 Unit(s) SubCutaneous every 8 hours  insulin lispro (HumaLOG) corrective regimen sliding scale   SubCutaneous Before meals and at bedtime  potassium phosphate IVPB 30 milliMole(s) IV Intermittent once  timolol 0.5% Solution 1 Drop(s) Both EYES every 12 hours    MEDICATIONS  (PRN):  dextrose 40% Gel 15 Gram(s) Oral once PRN Blood Glucose LESS THAN 70 milliGRAM(s)/deciliter  glucagon  Injectable 1 milliGRAM(s) IntraMuscular once PRN Glucose LESS THAN 70 milligrams/deciliter      ALLERGIES:  Allergies    No Known Allergies    Intolerances        LABS:                        12.6   18.75 )-----------( 154      ( 16 Apr 2019 06:38 )             38.6     04-16    141  |  108  |  25<H>  ----------------------------<  135<H>  3.7   |  23  |  0.56    Ca    8.9      16 Apr 2019 06:38  Phos  1.3     04-16  Mg     2.3     04-16            CARDIAC MARKERS ( 16 Apr 2019 06:38 )  x     / x     / 860 U/L / x     / x      CARDIAC MARKERS ( 14 Apr 2019 20:37 )  x     / x     / 3872 U/L / x     / x            RADIOLOGY & ADDITIONAL TESTS: Reviewed. INTERVAL HPI/OVERNIGHT EVENTS: Echocardiogram without vegetation. Surveillance cultures with NGTD.    SUBJECTIVE: Patient seen and examined at bedside. No complaints overnight, no changes in mental status, denies chest pain, nausea, vomiting, abdominal pain, endorses back pain.    OBJECTIVE:    VITAL SIGNS:  ICU Vital Signs Last 24 Hrs  T(C): 37.2 (16 Apr 2019 08:55), Max: 37.2 (16 Apr 2019 08:55)  T(F): 98.9 (16 Apr 2019 08:55), Max: 98.9 (16 Apr 2019 08:55)  HR: 89 (16 Apr 2019 08:55) (82 - 95)  BP: 145/80 (15 Apr 2019 20:37) (145/80 - 153/91)  BP(mean): --  ABP: --  ABP(mean): --  RR: 16 (16 Apr 2019 08:55) (16 - 20)  SpO2: 92% (16 Apr 2019 08:55) (92% - 95%)        04-15 @ 07:01  -  04-16 @ 07:00  --------------------------------------------------------  IN: 0 mL / OUT: 750 mL / NET: -750 mL      CAPILLARY BLOOD GLUCOSE      POCT Blood Glucose.: 146 mg/dL (16 Apr 2019 12:04)      PHYSICAL EXAM:    General: Elderly male sitting in bed NAD  HEENT: Anicteric sclera. PERRL. EOMI. Oral mucosa moist.   Neck: No appreciable JVD  Resp: Diffuse wheezing bilaterally, bibasilar crackles  Back: No point tenderness  Cardiac: S1/S2 appreciated, No murmurs, rubs or gallops. Regular Rate and Rhythm.  GI: Soft, increased  distension with tenderness  hypoactive BS, Tympanic to percussion.  Neuro: AAOx2(Person, place), following commands, No facial droop. No dysarthria, no  aphasia.  Pupils equally round and reactive to light, visual fields full, no nystagmus,  extraocular muscles intact, V1 through V3 intact bilaterally and symmetric, no facial droop,  palate elevation symmetric, tongue was midline,5/5 strength in upper and 4/5 lower  extremities. 0+patellar reflex. 1+ bicep and brachioradialis b/l. Sensation intact.   Extremities: No edema noted, DP intact  Skin: no  ulcers, midline scar on lumbar spine region    MEDICATIONS:  MEDICATIONS  (STANDING):  ALBUTerol/ipratropium for Nebulization 3 milliLiter(s) Nebulizer every 6 hours  ceFAZolin  Injectable. 2000 milliGRAM(s) IV Push every 8 hours  dextrose 5%. 1000 milliLiter(s) (50 mL/Hr) IV Continuous <Continuous>  dextrose 50% Injectable 12.5 Gram(s) IV Push once  dextrose 50% Injectable 25 Gram(s) IV Push once  dextrose 50% Injectable 25 Gram(s) IV Push once  heparin  Injectable 5000 Unit(s) SubCutaneous every 8 hours  insulin lispro (HumaLOG) corrective regimen sliding scale   SubCutaneous Before meals and at bedtime  potassium phosphate IVPB 30 milliMole(s) IV Intermittent once  timolol 0.5% Solution 1 Drop(s) Both EYES every 12 hours    MEDICATIONS  (PRN):  dextrose 40% Gel 15 Gram(s) Oral once PRN Blood Glucose LESS THAN 70 milliGRAM(s)/deciliter  glucagon  Injectable 1 milliGRAM(s) IntraMuscular once PRN Glucose LESS THAN 70 milligrams/deciliter      ALLERGIES:  Allergies    No Known Allergies    Intolerances        LABS:                        12.6   18.75 )-----------( 154      ( 16 Apr 2019 06:38 )             38.6     04-16    141  |  108  |  25<H>  ----------------------------<  135<H>  3.7   |  23  |  0.56    Ca    8.9      16 Apr 2019 06:38  Phos  1.3     04-16  Mg     2.3     04-16            CARDIAC MARKERS ( 16 Apr 2019 06:38 )  x     / x     / 860 U/L / x     / x      CARDIAC MARKERS ( 14 Apr 2019 20:37 )  x     / x     / 3872 U/L / x     / x            RADIOLOGY & ADDITIONAL TESTS: Reviewed.

## 2019-04-16 NOTE — PROGRESS NOTE ADULT - ASSESSMENT
89M with PMH of SSS s/p PPM, HTN, Glaucoma in both eyes (multiple surgeries at [facility]), pyogenic granuloma of eyelid s/p surgery, dementia presenting for b/l LE weakness and back pain in setting of altered mental status. Patient admitted to [facility] for metabolic encephalopathy, Rhabdomyolysis and severe sepsis- concerning for discitis.

## 2019-04-16 NOTE — PROGRESS NOTE ADULT - SUBJECTIVE AND OBJECTIVE BOX
INTERVAL HPI/OVERNIGHT EVENTS: PO o/n. Patient notes slight worsening of subjective weakness in his legs. Has difficulty working with physical therapy due to weakness. Tolerating antibiotics.     VITAL SIGNS:  T(F): 98.9 (04-16-19 @ 08:55)  HR: 89 (04-16-19 @ 08:55)  BP: 145/80 (04-15-19 @ 20:37)  RR: 16 (04-16-19 @ 08:55)  SpO2: 92% (04-16-19 @ 08:55)  Wt(kg): --    PHYSICAL EXAM:    Constitutional: NAD. Speaking in full sentences.   Neurological:  Mental status: A&Ox3. Speech fluent w/o aphasia or dysarthria.   CN: [place]. NL visual vields. No nystagmus. All extraocular movements intact. V1-V3 intact symmetrically/bilaterally. Shoulder shrug 5/5. Nl tongue movement.   Motor: Normal bulk and tone. No clonus. 4/5 b/l upper extremities proximal and distal. 5/5 hand  strength. Rle 2+/5 at hip. LLE 2/5 at hip. 3/5 b/l knee flexion and extension.   Sensation. Grossly intact b/l.   Reflexes: symmetric down going toes.     MEDICATIONS  (STANDING):  ALBUTerol/ipratropium for Nebulization 3 milliLiter(s) Nebulizer every 6 hours  ceFAZolin  Injectable. 2000 milliGRAM(s) IV Push every 8 hours  dextrose 5%. 1000 milliLiter(s) (50 mL/Hr) IV Continuous <Continuous>  dextrose 50% Injectable 12.5 Gram(s) IV Push once  dextrose 50% Injectable 25 Gram(s) IV Push once  dextrose 50% Injectable 25 Gram(s) IV Push once  heparin  Injectable 5000 Unit(s) SubCutaneous every 8 hours  insulin lispro (HumaLOG) corrective regimen sliding scale   SubCutaneous Before meals and at bedtime  potassium phosphate IVPB 30 milliMole(s) IV Intermittent once  timolol 0.5% Solution 1 Drop(s) Both EYES every 12 hours    MEDICATIONS  (PRN):  dextrose 40% Gel 15 Gram(s) Oral once PRN Blood Glucose LESS THAN 70 milliGRAM(s)/deciliter  glucagon  Injectable 1 milliGRAM(s) IntraMuscular once PRN Glucose LESS THAN 70 milligrams/deciliter      Allergies    No Known Allergies    Intolerances        LABS:                        12.6   18.75 )-----------( 154      ( 16 Apr 2019 06:38 )             38.6     04-16    141  |  108  |  25<H>  ----------------------------<  135<H>  3.7   |  23  |  0.56    Ca    8.9      16 Apr 2019 06:38  Phos  1.3     04-16  Mg     2.3     04-16            RADIOLOGY & ADDITIONAL TESTS:

## 2019-04-16 NOTE — PROGRESS NOTE ADULT - ASSESSMENT
89M with PMH of SSS s/p PPM, HTN, Glaucoma in both eyes (multiple surgeries at [facility]), pyogenic granuloma of eyelid s/p surgery, dementia presenting for b/l LE weakness and back pain in setting of altered mental status. Patient admitted to [facility] for metabolic encephalopathy, Rhabdomyolysis and severe sepsis- concerning for discitis.     Problem/Plan - 1:  ·  Problem: Severe sepsis.  Plan: P/w Severe sepsis, T 102F, Leukocytosis 26, reported tachypnea. HR 90s. Lactate elevation to 4.9-> 3.2 -> 1.8. . CRP 20. ESR 30. MRI findings  concerning for discitis and small epidural abscess of T12/L1 No associated significant endplate erosion or marrow edema. Findings are again concerning for discitis at T12-L1 (without significant osteomyelitis at this time). s/p 2.3L NS in the ED.  -F/u BCx - MSSA  - c/w Cefazolin   - ID on board  - F/u Ortho spine - aware, no surgical intervention  - Plan for TTE given MSSA bacteremia to rule out Endocarditis    #Abdominal Distension   Patient with worsening abdominal distension on physical exam, denied abdominal pain, however was tender to palpation in RUQ. Leukocytosis improving on AM labs.   Abdominal Xray with large stool burden, no evidence of perforation or obstruction  - Bowel Regimen - Mirilax, Senna, Colace    #Wheezing  Patient also noted to be wheezing on AM physical exam, improving with duonebs. Echocardiogram EF 60%, no vegetation. CXR  Mildly worsened bibasilar hazy opacities and small pleural effusion.   - Low concern for cardiac wheeze  - Suspect intrinsic pulmonary disease  - Duonebs PRN.     Problem/Plan - 2:  ·  Problem: Metabolic encephalopathy.  Plan: Most likely etiology secondary to ongoing infection- considering patient came in with severe sepsis. With resuscitation patient able to better respond to questions.  - Infection: c/w Cefepime for MSSA bacteremia likely primary cause of encephalopathy  - Vascular: CTH negative for acute intracranial hemorrhage or infarct but noted chronic microangiopathic disease including bilateral lacunar infarcts, If no   - Vitamin B12 and Folate WNL.     Problem/Plan - 3:  ·  Problem: Rhabdomyolysis.  Plan: Has not been walking much per patient. CK at 7744 downtrending on AM labs  - Discontinued, unknown EF, will continue to monitor fluid status.     Problem/Plan - 4:  ·  Problem: Transaminitis.  Plan: /ALT 47, initial TBili 1.6 with Alk Phos 182, likely in setting of Rhabdo cause of transaminitis.  -c/w monitoring LFTs  -Hep panel negative.     Problem/Plan - 5:  ·  Problem: Back pain.  Plan: Noted back pain on presentation. Initial CT concerning for findings of developing osteo or discitis. MRI with small epidural abscess and discitis. past surgical hx of posterior spinal fusion from L4 to S1 and prior L2-S1 laminectomy, found to have disc bulging T12 L1 on MRI likely source of pain  - F/u Ortho-spine - no intervention at this time  - Neuro recs.     Problem/Plan - 6:  Problem: Essential hypertension. Plan: Hx of HTN on amlodipine and losartan  - Hold in setting of sepsis  - Plan to restart as BP tolerates.    Problem/Plan - 7:  ·  Problem: HLD (hyperlipidemia).  Plan: Hx of HLD on home Lipitor  -Hold lipitor in setting of rhabdo.     Problem/Plan - 8:  ·  Problem: Sick sinus syndrome.  Plan: Hx of SSS s/p PPM, PPM interrogated on presentation and changed into MRI mode to evaluate back.

## 2019-04-16 NOTE — PROGRESS NOTE ADULT - SUBJECTIVE AND OBJECTIVE BOX
Physical Medicine and Rehabilitation Progress Note:    Patient is a 89y old  Male who presents with a chief complaint of Weakness, Altered mental status (16 Apr 2019 12:23)      HPI:  89M with PMH of SSS s/p PPM, HTN, Glaucoma in both eyes (multiple surgeries at [facility]), pyogenic granuloma of eyelid s/p surgery, dementia presenting for b/l LE weakness and back pain in setting of altered mental status. Patient initially unsure why he was brought into ED but upon asking about symptoms of back pain and LE weakness- he states over last week developed progressively worsening lower back pain, no radiation of pain. Patient states he has had hx of LBP for which has had surgery in the past (confirmed on imaging that has prior posterior spinal fusion from L4 to S1 and prior L2-S1 laminectomy.). Reports also progressively worsening weakness for which he was unable to get out of bed today.     Upon arrival to ED, VS /71, HR 88, RR 17, T 98.4F -> later spiked to 102F rectal, O2 95% on RA. Labs notable for WBC 26.35, /ALT 47, initial TBili 1.6 with Alk Phos 182. Cr at 0.92, Lactate at 4.9. CK at 7744. Noted to be tachypneic for which placed on HFNC. CXR with questionable LLL infiltrate. CT lumbar spine with finding concerning for early discitis and/or osteomyelitis at T12-L1 level with no definitive epidural abscess. ICU Consulted for altered mental status and weakness. Ortho consulted in setting of finding concerning for discitis. MRI performed-initially with low suspicion for discitis/osteo. Therefore per ortho no intervention. Upon re-evaluation MRI demonstrated abnormal signal at T12-L1 disc with fluid expanding the disc space (i.e. lining of the disc space, especially anteriorly). No associated significant endplate erosion or marrow edema. Findings are again concerning for discitis at T12-L1 (without significant osteomyelitis at this time). Ortho reconsulted for which plan for IR biopsy but no other intervention at this time.  Patient reports that pain located in lower back above sacrum, no radiation of pain. Says he has had abdominal pain that he states sometimes is confused with his back pain. No changes in bowel habits. No dysuria- + incontinence. Denies recent fevers chills. No trauma. (14 Apr 2019 01:45)                            12.6   18.75 )-----------( 154      ( 16 Apr 2019 06:38 )             38.6       04-16    141  |  108  |  25<H>  ----------------------------<  135<H>  3.7   |  23  |  0.56    Ca    8.9      16 Apr 2019 06:38  Phos  1.3     04-16  Mg     2.3     04-16    TPro  6.1  /  Alb  2.6<L>  /  TBili  0.7  /  DBili  <0.2  /  AST  122<H>  /  ALT  68<H>  /  AlkPhos  92  04-16    Vital Signs Last 24 Hrs  T(C): 37.1 (16 Apr 2019 17:48), Max: 38.9 (16 Apr 2019 15:42)  T(F): 98.8 (16 Apr 2019 17:48), Max: 102 (16 Apr 2019 15:42)  HR: 89 (16 Apr 2019 08:55) (82 - 89)  BP: 162/79 (16 Apr 2019 15:48) (145/80 - 162/79)  BP(mean): --  RR: 21 (16 Apr 2019 15:48) (16 - 21)  SpO2: 95% (16 Apr 2019 15:48) (92% - 95%)    MEDICATIONS  (STANDING):  ALBUTerol/ipratropium for Nebulization 3 milliLiter(s) Nebulizer every 4 hours  ceFAZolin  Injectable. 2000 milliGRAM(s) IV Push every 8 hours  dextrose 5%. 1000 milliLiter(s) (50 mL/Hr) IV Continuous <Continuous>  dextrose 50% Injectable 12.5 Gram(s) IV Push once  dextrose 50% Injectable 25 Gram(s) IV Push once  dextrose 50% Injectable 25 Gram(s) IV Push once  docusate sodium 100 milliGRAM(s) Oral three times a day  heparin  Injectable 5000 Unit(s) SubCutaneous every 8 hours  insulin lispro (HumaLOG) corrective regimen sliding scale   SubCutaneous Before meals and at bedtime  polyethylene glycol 3350 17 Gram(s) Oral daily  senna 2 Tablet(s) Oral at bedtime  timolol 0.5% Solution 1 Drop(s) Both EYES every 12 hours    MEDICATIONS  (PRN):  acetaminophen   Tablet .. 650 milliGRAM(s) Oral every 6 hours PRN Temp greater or equal to 38C (100.4F)  dextrose 40% Gel 15 Gram(s) Oral once PRN Blood Glucose LESS THAN 70 milliGRAM(s)/deciliter  glucagon  Injectable 1 milliGRAM(s) IntraMuscular once PRN Glucose LESS THAN 70 milligrams/deciliter    Currently Undergoing Physical Therapy at bedside.    Functional Status Assessment:      Pain Assessment/Number Scale (0-10) Adult  Presence of Pain: complains of pain/discomfort  Body Location: LBP  Pain Description (Frequency/Quality): Pt could not provide pain level on numeriucal scale but stated "a little bit" was present  Pain Alleviating Factors: lying in semi-supine, static    Coping/Psychosocial      Coping  Observed Emotional State: accepting;  cooperative    Cognitive/Neuro      Cognitive/Perceptual/Neuro  Cognitive/Neuro/Behavioral [WDL Definition: Alert; opens eyes spontaneously; arouses to voice or touch; oriented x 4; follows commands; speech spontaneous, logical; purposeful motor response; behavior appropriate to situation]: WDL  Level of Consciousness: alert  Arousal Level: arouses to voice    Therapeutic Interventions      Bed Mobility  Bed Mobility Training Rolling/Turning: moderate assist (50% patient effort);  maximum assist (25% patient effort);  2 person assist;  bed rails  Bed Mobility Training Scooting: minimum assist (75% patient effort);  1 person assist;  Pt used b/l UE in sitting to scoot to EOB   Bed Mobility Training Sit-to-Supine: moderate assist (50% patient effort);  2 person assist;  maximum assist (25% patient effort)  Bed Mobility Training Supine-to-Sit: moderate assist (50% patient effort);  2 person assist  Bed Mobility Training Limitations: decreased ability to use arms for pushing/pulling;  decreased ability to use legs for bridging/pushing;  impaired ability to control trunk for mobility;  decreased strength;  impaired balance;  impaired postural control;  pain    Sit-Stand Transfer Training  Transfer Training Sit-to-Stand Transfer: moderate assist (50% patient effort);  2 person assist;  full weight-bearing   rolling walker  Transfer Training Stand-to-Sit Transfer: moderate assist (50% patient effort);  maximum assist (25% patient effort);  2 person assist;  full weight-bearing   rolling walker  Sit-to-Stand Transfer Training Transfer Safety Analysis: decreased balance;  decreased cognition;  decreased weight-shifting ability;  decreased strength;  impaired balance;  impaired postural control;  pain    Gait Training  Gait Training: moderate assist (50% patient effort);  2 person assist;  full weight-bearing   rolling walker;  Side step to left 5x w/ VC to weight shift and foot placement;  verbal cues  Gait Analysis: decreased step length;  decreased weight-shifting ability;  impaired balance;  decreased strength;  impaired postural control  Type of Rest Type of Rest: sitting  Duration of Rest Duration of Rest: 5 minutes. Pt declined to attempt another gait bout.     Therapeutic Exercise  Therapeutic Exercise Detail: PT instructed ankle pumps and supine heel slides. Pt c/o R knee pain during heel slides.             PM&R Impression: as above    Disposition Plan Recommendations: subacute rehab placement

## 2019-04-16 NOTE — PROGRESS NOTE ADULT - ASSESSMENT
89 yo M admitted with weakness, fever, altered mental status. He has a history of sick sinus syndrome s/p PPM, dementia (on galantamine), LBP s/p posterior spinal fusion L4-S1 and L2-S1 laminectomy 10 years ago, HTN, glaucoma in both eyes s/p multiple surgeries at [facility], pyogenic granuloma of eyelid presenting with complaints of feeling confused with generalized weakness , trouble getting out of bed , noted to be septic (MSSA bacteremia) with imaging showing T12-L1 disc with disc space expansion, concerning for discitis, also with small epidural abscess at t12-L1. Neurology was consulted for LE weakness.    -MRI C SPINE,  MRI T SPINE , MRI L Spine complete  -C spine with No MR evidence of osteomyelitis. Multilevel degenerative disc disease with disc-osteophyte complex all levels of the cervical spine including focal disc herniations at C2-C3, C5-C6 and C6-C7 levels. Central canal stenosis C5-C6 and C6-C7 . Multilevel foraminal narrowing greatest at C3-C4 and C5-C6 levels.  -Abnormal signal at T12-L1 disc with fluid expanding the disc space Findings are again concerning for discitis at T12-L1 with epidural abscess (without significant osteomyelitis at this time).  -T12-L1 level, disc bulge, ligamentous hypertrophy, epidural disease, moderate spinal canal narrowing, slight conus impingement.  -Etiology of LE weakness likely related to findings as above, there is combination of degenerative changes as well as  discitis, sight conus impingement which may be contributing to LE weakness   -will continue abx as recommended by ID. ortho spine consulted with no plans for surgical intervention.   - Neurology will sign off. please reconsult as needed. 89 yo M admitted with weakness, fever, altered mental status. He has a history of sick sinus syndrome s/p PPM, dementia (on galantamine), LBP s/p posterior spinal fusion L4-S1 and L2-S1 laminectomy 10 years ago, HTN, glaucoma in both eyes s/p multiple surgeries at [facility], pyogenic granuloma of eyelid presenting with complaints of feeling confused with generalized weakness , trouble getting out of bed , noted to be septic (MSSA bacteremia) with imaging showing T12-L1 disc with disc space expansion, concerning for discitis, also with small epidural abscess at t12-L1. Neurology was consulted for LE weakness.    -MRI C SPINE,  MRI T SPINE , MRI L Spine complete  -C spine with No MR evidence of osteomyelitis. Multilevel degenerative disc disease with disc-osteophyte complex all levels of the cervical spine including focal disc herniations at C2-C3, C5-C6 and C6-C7 levels. Central canal stenosis C5-C6 and C6-C7 . Multilevel foraminal narrowing greatest at C3-C4 and C5-C6 levels.  -Abnormal signal at T12-L1 disc with fluid expanding the disc space Findings are again concerning for discitis at T12-L1 with epidural abscess (without significant osteomyelitis at this time).  -T12-L1 level, disc bulge, ligamentous hypertrophy, epidural disease, moderate spinal canal narrowing, slight conus impingement.  -Etiology of LE weakness likely related to findings as above, there is combination of degenerative changes as well as  discitis, sight conus impingement which may be contributing to LE weakness   - continue abx as recommended by ID. ortho spine consulted with no plans for surgical intervention.   - Neurology will sign off. please reconsult as needed.

## 2019-04-16 NOTE — PROGRESS NOTE ADULT - SUBJECTIVE AND OBJECTIVE BOX
INTERVAL HPI/OVERNIGHT EVENTS:    Vital Signs Last 24 Hrs  T(C): 36.6 (15 Apr 2019 20:37), Max: 36.6 (15 Apr 2019 08:54)  T(F): 97.9 (15 Apr 2019 20:37), Max: 97.9 (15 Apr 2019 17:58)  HR: 82 (15 Apr 2019 20:37) (82 - 95)  BP: 145/80 (15 Apr 2019 20:37) (140/82 - 153/91)  BP(mean): --  ABP: --  ABP(mean): --  RR: 18 (15 Apr 2019 20:37) (17 - 20)  SpO2: 95% (15 Apr 2019 20:37) (95% - 97%)      PHYSICAL EXAM:  Constitutional: NAD, non-toxic, lying in bed comfortably  HEENT: no eye discharge, no nasal discharge, no pharyngeal erythema, moist membranes  Neck: no lymphadenopathy, no JVD,  no carotid bruit  Cardiovascular: S1 and S2, RRR,  no M/R/G, non-displaced PMI  Respiratory: no wheezing, no rhonchi, no cough, no crackles   Gastrointestinal: BS+, soft, non-distended, non-tender, no ascites  Extremities: warm to touch, no edema  Vascular: 2+ peripheral pulses, normal capillary refill  Neurological: AAO x 4, PERRLA, EOMI, no nystagmus, 5/5 muscle strength, sensation intact   Musculoskeletal: no joint swelling  Skin: No rashes, no skin breakdown      MEDICATIONS  (STANDING):  ALBUTerol/ipratropium for Nebulization 3 milliLiter(s) Nebulizer every 6 hours  ceFAZolin  Injectable. 2000 milliGRAM(s) IV Push every 8 hours  dextrose 5%. 1000 milliLiter(s) (50 mL/Hr) IV Continuous <Continuous>  dextrose 50% Injectable 12.5 Gram(s) IV Push once  dextrose 50% Injectable 25 Gram(s) IV Push once  dextrose 50% Injectable 25 Gram(s) IV Push once  heparin  Injectable 5000 Unit(s) SubCutaneous every 8 hours  insulin lispro (HumaLOG) corrective regimen sliding scale   SubCutaneous Before meals and at bedtime  timolol 0.5% Solution 1 Drop(s) Both EYES every 12 hours    MEDICATIONS  (PRN):  dextrose 40% Gel 15 Gram(s) Oral once PRN Blood Glucose LESS THAN 70 milliGRAM(s)/deciliter  glucagon  Injectable 1 milliGRAM(s) IntraMuscular once PRN Glucose LESS THAN 70 milligrams/deciliter    Allergies    No Known Allergies    Intolerances      LABS:                        12.6   18.75 )-----------( 154      ( 16 Apr 2019 06:38 )             38.6     04-16    141  |  108  |  25<H>  ----------------------------<  135<H>  3.7   |  23  |  0.56    Ca    8.9      16 Apr 2019 06:38  Phos  1.3     04-16  Mg     2.3     04-16          RADIOLOGY & ADDITIONAL TESTS: Reviewed INFECTIOUS DISEASE CONSULT PROGRESS NOTE    INTERVAL HPI/OVERNIGHT EVENTS: No acute events overnight. Patient reports that he is "not feeling well" but without any specific complaints. He feels that his back pain is stable from yesterday as is his lower extremity weakness.     ACTIVE ANTIMICROBIALS/ANTIBIOTICS:  ceFAZolin  Injectable. 2000 milliGRAM(s) IV Push every 8 hours      VITAL SIGNS:  ICU Vital Signs Last 24 Hrs  T(C): 37.2 (16 Apr 2019 08:55), Max: 37.2 (16 Apr 2019 08:55)  T(F): 98.9 (16 Apr 2019 08:55), Max: 98.9 (16 Apr 2019 08:55)  HR: 89 (16 Apr 2019 08:55) (82 - 95)  BP: 145/80 (15 Apr 2019 20:37) (140/82 - 153/91)  BP(mean): --  ABP: --  ABP(mean): --  RR: 16 (16 Apr 2019 08:55) (16 - 20)  SpO2: 92% (16 Apr 2019 08:55) (92% - 97%)      PHYSICAL EXAM:  Constitutional: WDWN  Head: NC/AT  Eyes: PERRL, anicteric sclera  ENMT: no rhinorrhea; clear oropharynx; MMM  Neck: supple  Respiratory: audible wheezing b/l  Cardiovascular: +S1/S2, RRR  Gastrointestinal: soft, NT/ND; intact BS  Extremities: WWP; no clubbing, cyanosis, or edema  Vascular: 2+ radial, DP/PT pulses B/L  Dermatologic: skin warm and dry; no visible rashes or lesions  Neurologic: AAOx3, 3/5 strength in lower extremities with normal sensation, 5/5 in upper extremities     LABS:                        12.6   18.75 )-----------( 154      ( 16 Apr 2019 06:38 )             38.6       04-16    141  |  108  |  25<H>  ----------------------------<  135<H>  3.7   |  23  |  0.56    Ca    8.9      16 Apr 2019 06:38  Phos  1.3     04-16  Mg     2.3     04-16            MICROBIOLOGY:    Culture - Blood (collected 04-15-19 @ 19:54)  Source: .Blood Blood-Venous  Preliminary Report (04-16-19 @ 08:00):    No growth at 12 hours    Culture - Blood (collected 04-13-19 @ 20:46)  Source: .Blood Blood  Gram Stain (04-14-19 @ 08:42):    Aerobic and Anaerobic Bottle: Gram Positive Cocci in Clusters    Result called to and read back by_ MsFlorentin Hurtado RN  04/14/2019 08:35:39  Final Report (04-16-19 @ 08:07):    Growth in aerobic and anaerobic bottles: Staphylococcus aureus  Organism: Staphylococcus aureus (04-16-19 @ 08:07)  Organism: Staphylococcus aureus (04-16-19 @ 08:07)      -  Cefazolin: S <=4      -  Clindamycin: S 0.5      -  Erythromycin: S <=0.25      -  Linezolid: S 2      -  Oxacillin: S <=0.25      -  RIF- Rifampin: S <=1 Should not be used as monotherapy      -  Trimethoprim/Sulfamethoxazole: S <=0.5/9.5      -  Vancomycin: S 1      Method Type: JOSELUIS    Culture - Blood (collected 04-13-19 @ 20:46)  Source: .Blood Blood  Gram Stain (04-14-19 @ 08:36):    Aerobic and Anaerobic Bottle: Gram Positive Cocci in Clusters    Result called to and read back by_ Ms. BELLO Hurtado RN  04/14/2019 08:35:39    ***Blood Panel PCR results on this specimen are available    approximately 3 hours after the Gram stain result.***    Gram stain, PCR, and/or culture results may not always    correspond due to difference in methodologies.    ************************************************************    This PCR assay was performed using [email or web address].    The following targets are tested for: Enterococcus,    vancomycin resistant enterococci, Listeria monocytogenes,    coagulase negative staphylococci, S. aureus,    methicillin resistant S. aureus, Streptococcus agalactiae    (Group B), S. pneumoniae, S. pyogenes (Group A),    Acinetobacter baumannii, Enterobacter cloacae, E. coli,    Klebsiella oxytoca, K. pneumoniae, Proteus sp.,    Serratia marcescens, Haemophilus influenzae,    Neisseria meningitidis, Pseudomonas aeruginosa, Candida    albicans, C. glabrata, C krusei, C parapsilosis,    C. tropicalis and the KPC resistance gene.    "Due to technical problems, Proteus sp. will Not be reported as part of    the BCID panel until further notice"  Final Report (04-16-19 @ 08:05):    Growth in aerobic and anaerobic bottles: Staphylococcus aureus  Organism: Staphylococcus aureus  Blood Culture PCR (04-16-19 @ 08:05)  Organism: Blood Culture PCR (04-16-19 @ 08:05)      -  Staphylococcus aureus: Detec Any isolate of Staphylococcus aureus from a blood culture is NOT considered a contaminant.      Method Type: PCR  Organism: Staphylococcus aureus (04-16-19 @ 08:05)      -  Cefazolin: S <=4      -  Clindamycin: S 0.5      -  Erythromycin: S <=0.25      -  Linezolid: S 2      -  Oxacillin: S <=0.25      -  RIF- Rifampin: S <=1 Should not be used as monotherapy      -  Trimethoprim/Sulfamethoxazole: S <=0.5/9.5      -  Vancomycin: S 2      Method Type: JOSELUIS    Culture - Urine (collected 04-13-19 @ 14:27)  Source: .Urine Clean Catch (Midstream)  Final Report (04-14-19 @ 08:34):    No growth        RADIOLOGY & ADDITIONAL STUDIES:

## 2019-04-16 NOTE — PROGRESS NOTE ADULT - PROBLEM SELECTOR PLAN 1
P/w Severe sepsis, T 102F, Leukocytosis 26, reported tachypnea. HR 90s. Lactate elevation to 4.9-> 3.2 -> 1.8. . CRP 20. ESR 30. MRI findings  concerning for discitis and small epidural abscess of T12/L1 No associated significant endplate erosion or marrow edema. Findings are again concerning for discitis at T12-L1 (without significant osteomyelitis at this time). s/p 2.3L NS in the ED.  -F/u BCx - MSSA  - c/w Cefazolin   - ID on board  - F/u Ortho spine - aware, no surgical intervention  - Plan for TTE given MSSA bacteremia to rule out Endocarditis    #Abdominal Distension   Patient with worsening abdominal distension on physical exam, denied abdominal pain, however was tender to palpation in RUQ. Leukocytosis improving on AM labs.   Abdominal Xray with large stool burden, no evidence of perforation or obstruction  - Bowel Regimen - Mirilax, Senna, Colace    #Wheezing  Patient also noted to be wheezing on AM physical exam, improving with duonebs. Echocardiogram EF 60%, no vegetation. CXR  Mildly worsened bibasilar hazy opacities and small pleural effusion P/w Severe sepsis, T 102F, Leukocytosis 26, reported tachypnea. HR 90s. Lactate elevation to 4.9-> 3.2 -> 1.8. . CRP 20. ESR 30. MRI findings  concerning for discitis and small epidural abscess of T12/L1 No associated significant endplate erosion or marrow edema. Findings are again concerning for discitis at T12-L1 (without significant osteomyelitis at this time). s/p 2.3L NS in the ED.  -F/u BCx - MSSA  - c/w Cefazolin   - ID on board  - F/u Ortho spine - aware, no surgical intervention  - Plan for TTE given MSSA bacteremia to rule out Endocarditis    #Abdominal Distension   Patient with worsening abdominal distension on physical exam, denied abdominal pain, however was tender to palpation in RUQ. Leukocytosis improving on AM labs.   Abdominal Xray with large stool burden, no evidence of perforation or obstruction  - Bowel Regimen - Mirilax, Senna, Colace    #Wheezing  Patient also noted to be wheezing on AM physical exam, improving with duonebs. Echocardiogram EF 60%, no vegetation. CXR  Mildly worsened bibasilar hazy opacities and small pleural effusion.   - Low concern for cardiac wheeze  - Suspect intrinsic pulmonary disease  - Duonebs PRN

## 2019-04-17 DIAGNOSIS — J18.9 PNEUMONIA, UNSPECIFIED ORGANISM: ICD-10-CM

## 2019-04-17 LAB
ALBUMIN SERPL ELPH-MCNC: 2.2 G/DL — LOW (ref 3.3–5)
ALP SERPL-CCNC: 91 U/L — SIGNIFICANT CHANGE UP (ref 40–120)
ALT FLD-CCNC: 53 U/L — HIGH (ref 10–45)
ANION GAP SERPL CALC-SCNC: 11 MMOL/L — SIGNIFICANT CHANGE UP (ref 5–17)
APPEARANCE UR: CLEAR — SIGNIFICANT CHANGE UP
AST SERPL-CCNC: 71 U/L — HIGH (ref 10–40)
BASOPHILS # BLD AUTO: 0 K/UL — SIGNIFICANT CHANGE UP (ref 0–0.2)
BASOPHILS NFR BLD AUTO: 0 % — SIGNIFICANT CHANGE UP (ref 0–2)
BILIRUB SERPL-MCNC: 1.1 MG/DL — SIGNIFICANT CHANGE UP (ref 0.2–1.2)
BILIRUB UR-MCNC: NEGATIVE — SIGNIFICANT CHANGE UP
BUN SERPL-MCNC: 32 MG/DL — HIGH (ref 7–23)
CALCIUM SERPL-MCNC: 8.9 MG/DL — SIGNIFICANT CHANGE UP (ref 8.4–10.5)
CHLORIDE SERPL-SCNC: 112 MMOL/L — HIGH (ref 96–108)
CO2 SERPL-SCNC: 21 MMOL/L — LOW (ref 22–31)
COLOR SPEC: YELLOW — SIGNIFICANT CHANGE UP
CREAT SERPL-MCNC: 0.69 MG/DL — SIGNIFICANT CHANGE UP (ref 0.5–1.3)
DIFF PNL FLD: ABNORMAL
EOSINOPHIL # BLD AUTO: 0 K/UL — SIGNIFICANT CHANGE UP (ref 0–0.5)
EOSINOPHIL NFR BLD AUTO: 0 % — SIGNIFICANT CHANGE UP (ref 0–6)
GLUCOSE BLDC GLUCOMTR-MCNC: 115 MG/DL — HIGH (ref 70–99)
GLUCOSE BLDC GLUCOMTR-MCNC: 136 MG/DL — HIGH (ref 70–99)
GLUCOSE BLDC GLUCOMTR-MCNC: 154 MG/DL — HIGH (ref 70–99)
GLUCOSE BLDC GLUCOMTR-MCNC: 230 MG/DL — HIGH (ref 70–99)
GLUCOSE SERPL-MCNC: 144 MG/DL — HIGH (ref 70–99)
GLUCOSE UR QL: NEGATIVE — SIGNIFICANT CHANGE UP
HCT VFR BLD CALC: 35.9 % — LOW (ref 39–50)
HGB BLD-MCNC: 11.8 G/DL — LOW (ref 13–17)
KETONES UR-MCNC: NEGATIVE — SIGNIFICANT CHANGE UP
LEUKOCYTE ESTERASE UR-ACNC: ABNORMAL
LYMPHOCYTES # BLD AUTO: 1.39 K/UL — SIGNIFICANT CHANGE UP (ref 1–3.3)
LYMPHOCYTES # BLD AUTO: 8.7 % — LOW (ref 13–44)
MAGNESIUM SERPL-MCNC: 2.4 MG/DL — SIGNIFICANT CHANGE UP (ref 1.6–2.6)
MCHC RBC-ENTMCNC: 29.4 PG — SIGNIFICANT CHANGE UP (ref 27–34)
MCHC RBC-ENTMCNC: 32.9 GM/DL — SIGNIFICANT CHANGE UP (ref 32–36)
MCV RBC AUTO: 89.3 FL — SIGNIFICANT CHANGE UP (ref 80–100)
MONOCYTES # BLD AUTO: 1.12 K/UL — HIGH (ref 0–0.9)
MONOCYTES NFR BLD AUTO: 7 % — SIGNIFICANT CHANGE UP (ref 2–14)
NEUTROPHILS # BLD AUTO: 13.23 K/UL — HIGH (ref 1.8–7.4)
NEUTROPHILS NFR BLD AUTO: 82.6 % — HIGH (ref 43–77)
NITRITE UR-MCNC: NEGATIVE — SIGNIFICANT CHANGE UP
NRBC # BLD: 0 /100 WBCS — SIGNIFICANT CHANGE UP (ref 0–0)
PH UR: 6 — SIGNIFICANT CHANGE UP (ref 5–8)
PHOSPHATE SERPL-MCNC: 1.6 MG/DL — LOW (ref 2.5–4.5)
PLATELET # BLD AUTO: 143 K/UL — LOW (ref 150–400)
POTASSIUM SERPL-MCNC: 3.6 MMOL/L — SIGNIFICANT CHANGE UP (ref 3.5–5.3)
POTASSIUM SERPL-SCNC: 3.6 MMOL/L — SIGNIFICANT CHANGE UP (ref 3.5–5.3)
PROT SERPL-MCNC: 5.8 G/DL — LOW (ref 6–8.3)
PROT UR-MCNC: 30 MG/DL
RBC # BLD: 4.02 M/UL — LOW (ref 4.2–5.8)
RBC # FLD: 14.5 % — SIGNIFICANT CHANGE UP (ref 10.3–14.5)
SODIUM SERPL-SCNC: 144 MMOL/L — SIGNIFICANT CHANGE UP (ref 135–145)
SP GR SPEC: 1.01 — SIGNIFICANT CHANGE UP (ref 1–1.03)
UROBILINOGEN FLD QL: 0.2 E.U./DL — SIGNIFICANT CHANGE UP
WBC # BLD: 16.02 K/UL — HIGH (ref 3.8–10.5)
WBC # FLD AUTO: 16.02 K/UL — HIGH (ref 3.8–10.5)

## 2019-04-17 PROCEDURE — 99232 SBSQ HOSP IP/OBS MODERATE 35: CPT | Mod: GC

## 2019-04-17 PROCEDURE — 71045 X-RAY EXAM CHEST 1 VIEW: CPT | Mod: 26

## 2019-04-17 PROCEDURE — 99233 SBSQ HOSP IP/OBS HIGH 50: CPT | Mod: GC

## 2019-04-17 RX ORDER — POTASSIUM PHOSPHATE, MONOBASIC POTASSIUM PHOSPHATE, DIBASIC 236; 224 MG/ML; MG/ML
15 INJECTION, SOLUTION INTRAVENOUS ONCE
Qty: 0 | Refills: 0 | Status: COMPLETED | OUTPATIENT
Start: 2019-04-17 | End: 2019-04-17

## 2019-04-17 RX ORDER — FUROSEMIDE 40 MG
20 TABLET ORAL ONCE
Qty: 0 | Refills: 0 | Status: COMPLETED | OUTPATIENT
Start: 2019-04-17 | End: 2019-04-17

## 2019-04-17 RX ORDER — ASPIRIN/CALCIUM CARB/MAGNESIUM 324 MG
81 TABLET ORAL DAILY
Qty: 0 | Refills: 0 | Status: DISCONTINUED | OUTPATIENT
Start: 2019-04-17 | End: 2019-04-25

## 2019-04-17 RX ORDER — POTASSIUM CHLORIDE 20 MEQ
40 PACKET (EA) ORAL ONCE
Qty: 0 | Refills: 0 | Status: COMPLETED | OUTPATIENT
Start: 2019-04-17 | End: 2019-04-17

## 2019-04-17 RX ADMIN — POLYETHYLENE GLYCOL 3350 17 GRAM(S): 17 POWDER, FOR SOLUTION ORAL at 11:10

## 2019-04-17 RX ADMIN — HEPARIN SODIUM 5000 UNIT(S): 5000 INJECTION INTRAVENOUS; SUBCUTANEOUS at 13:42

## 2019-04-17 RX ADMIN — Medication 1: at 22:27

## 2019-04-17 RX ADMIN — Medication 3 MILLILITER(S): at 06:13

## 2019-04-17 RX ADMIN — HEPARIN SODIUM 5000 UNIT(S): 5000 INJECTION INTRAVENOUS; SUBCUTANEOUS at 06:14

## 2019-04-17 RX ADMIN — Medication 100 MILLIGRAM(S): at 21:23

## 2019-04-17 RX ADMIN — Medication 2000 MILLIGRAM(S): at 09:02

## 2019-04-17 RX ADMIN — Medication 20 MILLIGRAM(S): at 11:54

## 2019-04-17 RX ADMIN — Medication 3 MILLILITER(S): at 17:02

## 2019-04-17 RX ADMIN — Medication 2000 MILLIGRAM(S): at 17:02

## 2019-04-17 RX ADMIN — Medication 1 DROP(S): at 17:02

## 2019-04-17 RX ADMIN — Medication 100 MILLIGRAM(S): at 06:14

## 2019-04-17 RX ADMIN — Medication 3 MILLILITER(S): at 13:43

## 2019-04-17 RX ADMIN — Medication 2000 MILLIGRAM(S): at 01:57

## 2019-04-17 RX ADMIN — HEPARIN SODIUM 5000 UNIT(S): 5000 INJECTION INTRAVENOUS; SUBCUTANEOUS at 21:23

## 2019-04-17 RX ADMIN — Medication 40 MILLIEQUIVALENT(S): at 11:09

## 2019-04-17 RX ADMIN — Medication 1 DROP(S): at 06:14

## 2019-04-17 RX ADMIN — POTASSIUM PHOSPHATE, MONOBASIC POTASSIUM PHOSPHATE, DIBASIC 62.5 MILLIMOLE(S): 236; 224 INJECTION, SOLUTION INTRAVENOUS at 11:09

## 2019-04-17 RX ADMIN — Medication 2: at 11:17

## 2019-04-17 RX ADMIN — Medication 3 MILLILITER(S): at 21:23

## 2019-04-17 RX ADMIN — Medication 3 MILLILITER(S): at 09:02

## 2019-04-17 RX ADMIN — Medication 3 MILLILITER(S): at 01:56

## 2019-04-17 RX ADMIN — SENNA PLUS 2 TABLET(S): 8.6 TABLET ORAL at 21:23

## 2019-04-17 RX ADMIN — Medication 100 MILLIGRAM(S): at 13:43

## 2019-04-17 NOTE — PROGRESS NOTE ADULT - PROBLEM SELECTOR PLAN 2
Most likely etiology secondary to ongoing infection- considering patient came in with severe sepsis. With resuscitation patient able to better respond to questions.  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 With resuscitation patient able to better respond to questions.  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 With resuscitation patient able to better respond to questions.  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

## 2019-04-17 NOTE — DISCHARGE NOTE PROVIDER - PROVIDER TOKENS
PROVIDER:[TOKEN:[[id number]]] PROVIDER:[TOKEN:[[id number]]],PROVIDER:[TOKEN:[[id number]]]

## 2019-04-17 NOTE — DISCHARGE NOTE PROVIDER - CARE PROVIDERS DIRECT ADDRESSES
[email or web address]

## 2019-04-17 NOTE — PROGRESS NOTE ADULT - SUBJECTIVE AND OBJECTIVE BOX
INFECTIOUS DISEASE CONSULT PROGRESS NOTE    INTERVAL HPI/OVERNIGHT EVENTS: surveillance blood culture from 4/15 grew gram positive cocci in clusters    ACTIVE ANTIMICROBIALS/ANTIBIOTICS:  ceFAZolin  Injectable. 2000 milliGRAM(s) IV Push every 8 hours  rifampin 600 milliGRAM(s) Oral daily      VITAL SIGNS:  ICU Vital Signs Last 24 Hrs  T(C): 36.9 (17 Apr 2019 05:44), Max: 38.9 (16 Apr 2019 15:42)  T(F): 98.4 (17 Apr 2019 05:44), Max: 102 (16 Apr 2019 15:42)  HR: 86 (17 Apr 2019 05:44) (86 - 98)  BP: 154/76 (17 Apr 2019 05:44) (121/73 - 162/79)  BP(mean): --  ABP: --  ABP(mean): --  RR: 18 (17 Apr 2019 05:44) (16 - 21)  SpO2: 94% (17 Apr 2019 05:44) (91% - 96%)      PHYSICAL EXAM:  Constitutional: WDWN  Head: NC/AT  Eyes: PERRL, anicteric sclera  ENMT: no rhinorrhea; clear oropharynx; MMM  Neck: supple  Respiratory: CTA B/L  Cardiovascular: +S1/S2, RRR  Gastrointestinal: soft, NT/ND; intact BS  Extremities: WWP; no clubbing, cyanosis, or edema  Vascular: 2+ radial, DP/PT pulses B/L  Dermatologic: skin warm and dry; no visible rashes or lesions  Neurologic: AAOx3    LABS:                        11.8   16.02 )-----------( 143      ( 17 Apr 2019 06:29 )             35.9       04-16    141  |  108  |  39<H>  ----------------------------<  209<H>  4.0   |  19<L>  |  1.08    Ca    8.6      16 Apr 2019 17:30  Phos  1.3     04-16  Mg     2.3     04-16    TPro  5.8<L>  /  Alb  2.4<L>  /  TBili  1.1  /  DBili  0.4<H>  /  AST  103<H>  /  ALT  66<H>  /  AlkPhos  92  04-16      Lactate, Blood: 1.8 mmoL/L (04-16-19 @ 20:52)  Lactate, Blood: 2.5 mmoL/L (04-16-19 @ 17:28)      MICROBIOLOGY:    Culture - Blood (collected 04-16-19 @ 17:54)  Source: .Blood Blood-Venous  Preliminary Report (04-17-19 @ 06:01):    No growth at 12 hours    Culture - Blood (collected 04-16-19 @ 17:54)  Source: .Blood Blood  Preliminary Report (04-17-19 @ 06:01):    No growth at 12 hours    Culture - Blood (collected 04-15-19 @ 19:54)  Source: .Blood Blood-Venous  Gram Stain (04-16-19 @ 17:06):    Aerobic Bottle: Gram Positive Cocci in Clusters    Result called to and read back by_ MsFlorentin Vazquez RN(4UR)    04/16/2019 17:05:51  Preliminary Report (04-16-19 @ 17:06):    Culture in progress        RADIOLOGY & ADDITIONAL STUDIES: INFECTIOUS DISEASE CONSULT PROGRESS NOTE    INTERVAL HPI/OVERNIGHT EVENTS: surveillance blood culture from 4/15 grew gram positive cocci in clusters. Bandemia     ACTIVE ANTIMICROBIALS/ANTIBIOTICS:  ceFAZolin  Injectable. 2000 milliGRAM(s) IV Push every 8 hours  rifampin 600 milliGRAM(s) Oral daily      VITAL SIGNS:  ICU Vital Signs Last 24 Hrs  T(C): 36.9 (17 Apr 2019 05:44), Max: 38.9 (16 Apr 2019 15:42)  T(F): 98.4 (17 Apr 2019 05:44), Max: 102 (16 Apr 2019 15:42)  HR: 86 (17 Apr 2019 05:44) (86 - 98)  BP: 154/76 (17 Apr 2019 05:44) (121/73 - 162/79)  BP(mean): --  ABP: --  ABP(mean): --  RR: 18 (17 Apr 2019 05:44) (16 - 21)  SpO2: 94% (17 Apr 2019 05:44) (91% - 96%)      PHYSICAL EXAM:  Constitutional: WDWN  Head: NC/AT  Eyes: PERRL, anicteric sclera  ENMT: no rhinorrhea; clear oropharynx; MMM  Neck: supple  Respiratory: audible wheezing b/l  Cardiovascular: +S1/S2, RRR  Gastrointestinal: soft, NT/ND; intact BS  Extremities: WWP; no clubbing, cyanosis, or edema  Vascular: 2+ radial, DP/PT pulses B/L  Dermatologic: skin warm and dry; no visible rashes or lesions  Neurologic: AAOx3, 3/5 strength in lower extremities with normal sensation, 5/5 in upper extremities     LABS:                        11.8   16.02 )-----------( 143      ( 17 Apr 2019 06:29 )             35.9       04-16    141  |  108  |  39<H>  ----------------------------<  209<H>  4.0   |  19<L>  |  1.08    Ca    8.6      16 Apr 2019 17:30  Phos  1.3     04-16  Mg     2.3     04-16    TPro  5.8<L>  /  Alb  2.4<L>  /  TBili  1.1  /  DBili  0.4<H>  /  AST  103<H>  /  ALT  66<H>  /  AlkPhos  92  04-16      Lactate, Blood: 1.8 mmoL/L (04-16-19 @ 20:52)  Lactate, Blood: 2.5 mmoL/L (04-16-19 @ 17:28)      MICROBIOLOGY:    Culture - Blood (collected 04-16-19 @ 17:54)  Source: .Blood Blood-Venous  Preliminary Report (04-17-19 @ 06:01):    No growth at 12 hours    Culture - Blood (collected 04-16-19 @ 17:54)  Source: .Blood Blood  Preliminary Report (04-17-19 @ 06:01):    No growth at 12 hours    Culture - Blood (collected 04-15-19 @ 19:54)  Source: .Blood Blood-Venous  Gram Stain (04-16-19 @ 17:06):    Aerobic Bottle: Gram Positive Cocci in Clusters    Result called to and read back by_ Ms. Manuela Vazquez RN(4UR)    04/16/2019 17:05:51  Preliminary Report (04-16-19 @ 17:06):    Culture in progress        RADIOLOGY & ADDITIONAL STUDIES:

## 2019-04-17 NOTE — DISCHARGE NOTE PROVIDER - CARE PROVIDER_API CALL
Helio Ann)  Internal Medicine  [address]  [place], [place] [address]  Phone: [phone]  Fax: [phone]  Follow Up Time: Helio Ann)  Internal Medicine  [address] [place] [address]  Phone: [phone]  Fax: [phone]  Follow Up Time:     Rod Carlisle)  Cardiovascular Disease; Internal Medicine  [address] [place] [address]  Phone: [phone]  Fax: [phone]  Follow Up Time:

## 2019-04-17 NOTE — PROGRESS NOTE ADULT - PROBLEM SELECTOR PLAN 3
Has not been walking much per patient. CK at 7744 downtrending on AM labs  - Discontinued, unknown EF, will continue to monitor fluid status Resolved. Has not been walking much per patient. CK at 7744 downtrending on AM labs  will continue to monitor fluid status

## 2019-04-17 NOTE — PROGRESS NOTE ADULT - PROBLEM SELECTOR PLAN 1
Met severe sepsis criteria on admission T 102F, Leukocytosis 26, reported tachypnea. HR 90s. Lactate elevation to 4.9-> 3.2 -> 1.8. Found to have MSSA bacteremia secondary to discitis and T12/L1 epidural abscess. Continued bacteremia on 4/16 after T102F.   -ID recs appreciated  -Continue rifampin and Cefazolin  -Negative TTE for endocarditis, per ID will obtain gallium prior to SUSANA  -Ortho spine recs with no surgical intervention at this time      #Abdominal Distension   Abdominal Xray with large stool burden, no evidence of perforation or obstruction. Leukocytosis improving. had 2 BMs since yesterday.  - Bowel Regimen - Miralax, Senna, Colace    #Tachypnea and Wheezing  No wheezing on physical exam this AM. TTE EF 60%, no vegetation. CXR  Mildly worsened bibasilar hazy opacities and worsening pleural effusion on R.   - Low concern for cardiac wheeze  - Suspect intrinsic pulmonary disease  - Duonejef PRN Met severe sepsis criteria on admission T 102F, Leukocytosis 26, reported tachypnea. HR 90s. Lactate elevation to 4.9-> 3.2 -> 1.8. Found to have MSSA bacteremia secondary to discitis and T12/L1 epidural abscess. Continued bacteremia on 4/16 after T102F.   -ID recs appreciated  -Continue rifampin and Cefazolin  -Negative TTE for endocarditis, per ID will obtain gallium prior to SUSANA  -Ortho spine recs with no surgical intervention at this time      #Abdominal Distension   Abdominal Xray with large stool burden, no evidence of perforation or obstruction. Leukocytosis improving. had 2 BMs since yesterday.  - Bowel Regimen - Miralax, Senna, Colace    #Tachypnea and Wheezing  No wheezing on physical exam this AM. TTE EF 60%, no vegetation. CXR  Mildly worsened bibasilar hazy opacities and worsening pleural effusion on R.   - Duonebs PRN  - Will give one time dose lasix 20mg IVP given worsening pleural effusion

## 2019-04-17 NOTE — PROGRESS NOTE ADULT - PROBLEM SELECTOR PLAN 10
VTE: No HSQ pending potential biopsy    DNR/DNI (MOLST in chart)    DISPO: RMF VTE: HSQ  DNR/DNI (MOLST in chart)    DISPO: RMF

## 2019-04-17 NOTE — DISCHARGE NOTE PROVIDER - NSDCFUADDINST_GEN_ALL_CORE_FT
Please try to get out of bed as much as possible. Use an incentive spirometer at least 10 times per hour.

## 2019-04-17 NOTE — DISCHARGE NOTE PROVIDER - HOSPITAL COURSE
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 Severe sepsis on arrival 2/2 MSSA bacteremia with CT/MRI findings concerning for T12/L1 Discitis and epidural abscess. Neurosurgery and Orthopedics on board advised no surgical intervention. ID consulted - recommended Cefazolin and Rifampin 6 week course to end on 5/26. 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 Severe sepsis on arrival 2/2 MSSA bacteremia with CT/MRI findings concerning for T12/L1 Discitis and epidural abscess. Neurosurgery and Orthopedics on board advised no surgical intervention. ID consulted - recommended Cefazolin and Rifampin 6 week course to end on 5/28. Patient was noted to have tachypnea during admission; CXR showed atelectasis and patient was treated with incentive spirometry. Patient's abdomen was also noted to be distended and was placed on bowel regimen. Abdominal x-ray showed ileus. Patient continued on bowel regimen. Patient was observed until he was no longer bacteremic. PICC line was placed and patient is now medically stable for discharge to [facility]. 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), pyogenic granuloma of eyelid s/p surgery, dementia presenting for b/l LE weakness and back pain in setting of altered mental status. Severe sepsis on arrival 2/2 MSSA bacteremia with CT/MRI findings concerning for T12/L1 Discitis and epidural abscess. Neurosurgery and Orthopedics on board advised no surgical intervention. ID consulted - recommended Cefazolin and Rifampin 6 week course to end on 5/28. Patient was noted to have tachypnea during admission; CXR showed atelectasis and patient was treated with incentive spirometry. Patient's abdomen was also noted to be distended and was placed on bowel regimen. Abdominal x-ray showed ileus. Patient continued on bowel regimen. Patient was observed until he was no longer bacteremic. PICC line was placed and patient is now medically stable for discharge to [facility]. 89M with PMH of SSS s/p PPM, HTN, Glaucoma in both eyes (multiple surgeries at [facility]), pyogenic granuloma of eyelid s/p surgery, dementia presenting for b/l LE weakness and back pain in setting of altered mental status. Severe sepsis on arrival 2/2 MSSA bacteremia with CT/MRI findings concerning for T12/L1 Discitis and epidural abscess. Neurosurgery and Orthopedics on board advised no surgical intervention. ID consulted - recommended Cefazolin and Rifampin 6 week course to continue until at least 5/28. TTE was negative; however, patient refused SUSANA for vegetation evaluation. No murmurs were appreciated on cardiac exam throughout admission. Patient to follow up with Dr. Ann for exact antibiotic duration. Patient was noted to have tachypnea during admission; CXR showed atelectasis and patient was treated with incentive spirometry. Patient's abdomen was also noted to be distended and was placed on bowel regimen. Abdominal x-ray showed ileus. Patient continued on bowel regimen. Patient was observed until he was no longer bacteremic. PICC line was placed and patient is now medically stable for discharge to [facility]. 89M with PMH of SSS s/p PPM, HTN, Glaucoma in both eyes (multiple surgeries at [facility]), pyogenic granuloma of eyelid s/p surgery, dementia presenting for b/l LE weakness and back pain in setting of altered mental status. Severe sepsis on arrival 2/2 MSSA bacteremia with CT/MRI findings concerning for T12/L1 Discitis and epidural abscess. Neurosurgery and Orthopedics on board advised no surgical intervention. ID consulted - recommended Cefazolin and Rifampin 6 week course to continue until at least 5/28. TTE was negative; however, patient refused SUSANA for vegetation evaluation. No murmurs were appreciated on cardiac exam throughout admission. Patient to follow up with Dr. Ann for exact antibiotic duration. Patient was noted to have tachypnea during admission; CXR showed atelectasis and patient was treated with incentive spirometry. Patient's abdomen was also noted to be distended and was placed on bowel regimen. Abdominal x-ray showed ileus. Patient continued on bowel regimen. He also was found to have urinary retention for which a dias was placed. Patient failed TOV several times and a dias was replaced. Patient to be discharged with a dias for which continuation should be evaluated for in rehab. Patient was observed until he was no longer bacteremic. PICC line was placed and patient is now medically stable for discharge to [facility].

## 2019-04-17 NOTE — PROGRESS NOTE ADULT - ASSESSMENT
89M with PMH of SSS s/p PPM, L4-S1 post spinal fusion, L2-S1 laminectomy, HTN, Glaucoma, pyogenic granuloma of eyelid s/p surgery, dementia presenting for b/l LE weakness and back pain, found to have T12-L1 discitis and MSSA BSI.      - surveillance bcx drawn on 4/15 - gm+ cocci in clusters  - f/u surveillance cultures  - TTE with AV thickening, could not visualize right heart adequately  - c/w cefazolin 2g IV q8h  - c/w rifampin 600mg PO q24h for biofilm penetration given hardware  - continue above regimen for at least 6 weeks through 5/26/19  - if discharged, patient will need weekly CBC, CMP, ESR, CRP (please fax to Dr. Ann [phone])  - will discuss the possibility of SUSANA with the patient   - please obtain gallium scan    ID team 1 to follow

## 2019-04-17 NOTE — PROGRESS NOTE ADULT - SUBJECTIVE AND OBJECTIVE BOX
SUBJECTIVE / INTERVAL HPI: Patient seen and examined at bedside.     VITAL SIGNS:  Vital Signs Last 24 Hrs  T(C): 36.9 (17 Apr 2019 05:44), Max: 38.9 (16 Apr 2019 15:42)  T(F): 98.4 (17 Apr 2019 05:44), Max: 102 (16 Apr 2019 15:42)  HR: 86 (17 Apr 2019 05:44) (86 - 98)  BP: 154/76 (17 Apr 2019 05:44) (121/73 - 162/79)  BP(mean): --  RR: 18 (17 Apr 2019 05:44) (18 - 21)  SpO2: 94% (17 Apr 2019 05:44) (91% - 96%)    PHYSICAL EXAM:    General: well appearing, resting comfortably in bed and in no acute distress  HEENT: normocephalic, atraumatic, PERRL, anicteric sclera; no conjunctival pallor, mucus membranes moist  Neck: supple, no masses  Cardiovascular: +S1/S2, regular rate and rhythm; no murmurs, no rub, no gallop  Respiratory: clear to auscultation bilaterally, no rhonchi, no wheeze, no rales  Gastrointestinal: soft, active bowel sounds, non-tender, non-distended  Extremities: Warm, dry; no edema, clubbing or cyanosis  Vascular: 2+ radial, DP pulses bilaterally  Neurological: AAOx3; no focal deficits    MEDICATIONS:  MEDICATIONS  (STANDING):  ALBUTerol/ipratropium for Nebulization 3 milliLiter(s) Nebulizer every 4 hours  ceFAZolin  Injectable. 2000 milliGRAM(s) IV Push every 8 hours  dextrose 5%. 1000 milliLiter(s) (50 mL/Hr) IV Continuous <Continuous>  dextrose 50% Injectable 12.5 Gram(s) IV Push once  dextrose 50% Injectable 25 Gram(s) IV Push once  dextrose 50% Injectable 25 Gram(s) IV Push once  docusate sodium 100 milliGRAM(s) Oral three times a day  heparin  Injectable 5000 Unit(s) SubCutaneous every 8 hours  insulin lispro (HumaLOG) corrective regimen sliding scale   SubCutaneous Before meals and at bedtime  polyethylene glycol 3350 17 Gram(s) Oral daily  potassium chloride    Tablet ER 40 milliEquivalent(s) Oral once  potassium phosphate IVPB 15 milliMole(s) IV Intermittent once  rifampin 600 milliGRAM(s) Oral daily  senna 2 Tablet(s) Oral at bedtime  timolol 0.5% Solution 1 Drop(s) Both EYES every 12 hours    MEDICATIONS  (PRN):  acetaminophen   Tablet .. 650 milliGRAM(s) Oral every 6 hours PRN Temp greater or equal to 38C (100.4F)  dextrose 40% Gel 15 Gram(s) Oral once PRN Blood Glucose LESS THAN 70 milliGRAM(s)/deciliter  glucagon  Injectable 1 milliGRAM(s) IntraMuscular once PRN Glucose LESS THAN 70 milligrams/deciliter      ALLERGIES:  Allergies    No Known Allergies    Intolerances        LABS:                        11.8   16.02 )-----------( 143      ( 17 Apr 2019 06:29 )             35.9     04-17    144  |  112<H>  |  32<H>  ----------------------------<  144<H>  3.6   |  21<L>  |  0.69    Ca    8.9      17 Apr 2019 06:29  Phos  1.6     04-17  Mg     2.4     04-17    TPro  5.8<L>  /  Alb  2.2<L>  /  TBili  1.1  /  DBili  x   /  AST  71<H>  /  ALT  53<H>  /  AlkPhos  91  04-17        CAPILLARY BLOOD GLUCOSE      POCT Blood Glucose.: 136 mg/dL (17 Apr 2019 08:18)      RADIOLOGY & ADDITIONAL TESTS: Reviewed. SUBJECTIVE / INTERVAL HPI: Lactate overnight cleared to 1.8. Mental status unchanged. Patient seen and examined at bedside. Patient currently reporting only back pain. No other complaints at this time. He does not feel that he is SOB though noted to be tachypneic. Otherwise: (-) fever, (-) chills, (-) dizziness, (-) headache, (-) neck pain, (-) chest pain, (-) palpitations, (-)SOB, (-) nausea, (-) vomiting, (-) diarrhea, (-) abdominal pain, (-)new weakness, (-) paresthesias.      VITAL SIGNS:  Vital Signs Last 24 Hrs  T(C): 36.9 (17 Apr 2019 05:44), Max: 38.9 (16 Apr 2019 15:42)  T(F): 98.4 (17 Apr 2019 05:44), Max: 102 (16 Apr 2019 15:42)  HR: 86 (17 Apr 2019 05:44) (86 - 98)  BP: 154/76 (17 Apr 2019 05:44) (121/73 - 162/79)  BP(mean): --  RR: 18 (17 Apr 2019 05:44) (18 - 21)  SpO2: 94% (17 Apr 2019 05:44) (91% - 96%)    PHYSICAL EXAM:    General: well appearing, resting in bed and in no acute distress though noted to be tachypneic.  HEENT: normocephalic, atraumatic, PERRL, anicteric sclera; no conjunctival pallor, mucus membranes moist  Neck: supple, no masses  Cardiovascular: +S1/S2, regular rate and rhythm; no murmurs, no rub, no gallop  Respiratory:, no rhonchi, no wheeze, no rales appreciated in all fields  Gastrointestinal: soft, active bowel sounds, (+) b/l upper quadrant tenderness w/ deep palpation, (+) suprapubic tenderness, (+) distended  Extremities: Warm, dry; no edema, clubbing or cyanosis  Vascular: 2+ radial, DP pulses bilaterally  Neurological: AAOx3-oriented to self, location, and date this AM; no focal deficits. BLE strength: 4/5. Sensation intact throughout.     MEDICATIONS:  MEDICATIONS  (STANDING):  ALBUTerol/ipratropium for Nebulization 3 milliLiter(s) Nebulizer every 4 hours  ceFAZolin  Injectable. 2000 milliGRAM(s) IV Push every 8 hours  dextrose 5%. 1000 milliLiter(s) (50 mL/Hr) IV Continuous <Continuous>  dextrose 50% Injectable 12.5 Gram(s) IV Push once  dextrose 50% Injectable 25 Gram(s) IV Push once  dextrose 50% Injectable 25 Gram(s) IV Push once  docusate sodium 100 milliGRAM(s) Oral three times a day  heparin  Injectable 5000 Unit(s) SubCutaneous every 8 hours  insulin lispro (HumaLOG) corrective regimen sliding scale   SubCutaneous Before meals and at bedtime  polyethylene glycol 3350 17 Gram(s) Oral daily  potassium chloride    Tablet ER 40 milliEquivalent(s) Oral once  potassium phosphate IVPB 15 milliMole(s) IV Intermittent once  rifampin 600 milliGRAM(s) Oral daily  senna 2 Tablet(s) Oral at bedtime  timolol 0.5% Solution 1 Drop(s) Both EYES every 12 hours    MEDICATIONS  (PRN):  acetaminophen   Tablet .. 650 milliGRAM(s) Oral every 6 hours PRN Temp greater or equal to 38C (100.4F)  dextrose 40% Gel 15 Gram(s) Oral once PRN Blood Glucose LESS THAN 70 milliGRAM(s)/deciliter  glucagon  Injectable 1 milliGRAM(s) IntraMuscular once PRN Glucose LESS THAN 70 milligrams/deciliter      ALLERGIES:  Allergies    No Known Allergies    Intolerances        LABS:                        11.8   16.02 )-----------( 143      ( 17 Apr 2019 06:29 )             35.9     04-17    144  |  112<H>  |  32<H>  ----------------------------<  144<H>  3.6   |  21<L>  |  0.69    Ca    8.9      17 Apr 2019 06:29  Phos  1.6     04-17  Mg     2.4     04-17    TPro  5.8<L>  /  Alb  2.2<L>  /  TBili  1.1  /  DBili  x   /  AST  71<H>  /  ALT  53<H>  /  AlkPhos  91  04-17        CAPILLARY BLOOD GLUCOSE      POCT Blood Glucose.: 136 mg/dL (17 Apr 2019 08:18)      RADIOLOGY & ADDITIONAL TESTS: Reviewed.

## 2019-04-17 NOTE — PROGRESS NOTE ADULT - PROBLEM SELECTOR PLAN 4
Resolving.  Presented w/ /ALT 47, initial TBili 1.6 with Alk Phos 182, likely in setting of rhabdomyolysis   -c/w monitoring LFTs  -Hep panel negative

## 2019-04-17 NOTE — DISCHARGE NOTE PROVIDER - NSDCCPCAREPLAN_GEN_ALL_CORE_FT
PRINCIPAL DISCHARGE DIAGNOSIS  Diagnosis: Infectious encephalopathy  Assessment and Plan of Treatment: You came into the hospital with an infection of the spine. We started you on antibiotics for the infection. Please continue to take the medication as prescribed and continue to follow up with your Infectioous disease physician for continued management of this condition.      SECONDARY DISCHARGE DIAGNOSES  Diagnosis: Sick sinus syndrome  Assessment and Plan of Treatment: Consistent with your past medical history, please continue to take your medications as prescribed and continue to follow up with your Cardiologist for continued managment of this condition    Diagnosis: Discitis  Assessment and Plan of Treatment: You were found to have an infection of one of the intervertebral discs in your spine. You were started on antibiotics for the infection. Our Neurosurgery and Orthopedic team evaluated and recommended tno need for surgiacl intervention at this time. Please continue to take your medication as precribed and continue to follow up for continued managment of this condition.    Diagnosis: HLD (hyperlipidemia)  Assessment and Plan of Treatment: Consistent with your past medical history, please continue to take your medications as prescribed and continue to follow up with your primary care physician for continued managment of this condition    Diagnosis: Essential hypertension  Assessment and Plan of Treatment: Consistent with your past medical history, please continue to take your medications as prescribed and continue to follow up with your primary care physician for continued managment of this condition    Diagnosis: Rhabdomyolysis  Assessment and Plan of Treatment: You were found to have some elevated markers on your blood work consistent with some muscle breakdown. We started you on IV fluids and the condition resolved. Please continue to follow up for continued monitoring of this condition PRINCIPAL DISCHARGE DIAGNOSIS  Diagnosis: Infectious encephalopathy  Assessment and Plan of Treatment: You came into the hospital with an infection of the spine. We started you on antibiotics for the infection. Please continue to take the medication as prescribed and continue to follow up with your Infectioous disease physician for continued management of this condition.      SECONDARY DISCHARGE DIAGNOSES  Diagnosis: Essential hypertension  Assessment and Plan of Treatment: Consistent with your past medical history, please continue to take your medications as prescribed and continue to follow up with your primary care physician for continued managment of this condition    Diagnosis: HLD (hyperlipidemia)  Assessment and Plan of Treatment: Consistent with your past medical history, please continue to take your medications as prescribed and continue to follow up with your primary care physician for continued managment of this condition    Diagnosis: Sick sinus syndrome  Assessment and Plan of Treatment: Consistent with your past medical history, please continue to take your medications as prescribed and continue to follow up with your Cardiologist for continued managment of this condition    Diagnosis: Discitis  Assessment and Plan of Treatment: You were found to have an infection of one of the intervertebral discs in your spine. You were started on antibiotics for the infection. Our Neurosurgery and Orthopedic team evaluated and recommended tno need for surgiacl intervention at this time. Please continue to take your medication as precribed and continue to follow up for continued managment of this condition.    Diagnosis: Rhabdomyolysis  Assessment and Plan of Treatment: You were found to have some elevated markers on your blood work consistent with some muscle breakdown. We started you on IV fluids and the condition resolved. Please continue to follow up for continued monitoring of this condition PRINCIPAL DISCHARGE DIAGNOSIS  Diagnosis: MSSA (methicillin susceptible Staphylococcus aureus) infection  Assessment and Plan of Treatment: You were found to have bacteria in your blood stream due to the abscess in your back. You were treated with IV antibiotics and you will continue on IV antibiotic (Cefazolin) until May 28th. You are also on Rifampin (another antibiotic) because you have a pacemaker. Please follow up with Dr. Ann (the infectious disease doctor) as scheduled.      SECONDARY DISCHARGE DIAGNOSES  Diagnosis: Essential hypertension  Assessment and Plan of Treatment: Consistent with your past medical history, please continue to take your medications as prescribed and continue to follow up with your primary care physician for continued managment of this condition    Diagnosis: HLD (hyperlipidemia)  Assessment and Plan of Treatment: Consistent with your past medical history, please continue to take your medications as prescribed and continue to follow up with your primary care physician for continued managment of this condition    Diagnosis: Sick sinus syndrome  Assessment and Plan of Treatment: Consistent with your past medical history, please continue to take your medications as prescribed and continue to follow up with your Cardiologist for continued managment of this condition    Diagnosis: Discitis  Assessment and Plan of Treatment: You were found to have an infection of one of the intervertebral discs in your spine. You were started on antibiotics for the infection. Our Neurosurgery and Orthopedic team evaluated and recommended tno need for surgiacl intervention at this time. Please continue to take your medication as precribed and continue to follow up for continued managment of this condition.    Diagnosis: Rhabdomyolysis  Assessment and Plan of Treatment: You were found to have some elevated markers on your blood work consistent with some muscle breakdown. We started you on IV fluids and the condition resolved. Please continue to follow up for continued monitoring of this condition PRINCIPAL DISCHARGE DIAGNOSIS  Diagnosis: MSSA (methicillin susceptible Staphylococcus aureus) infection  Assessment and Plan of Treatment: You were found to have bacteria in your blood stream due to the abscess in your back. You were treated with IV antibiotics and you will continue on IV antibiotic (Cefazolin) until May 28th. You are also on Rifampin (another antibiotic) because you have a pacemaker. Please follow up with Dr. Ann (the infectious disease doctor) as scheduled on 5/9/19 at 10am and with Dr. Carlisle on  5/21/19 at 10am.      SECONDARY DISCHARGE DIAGNOSES  Diagnosis: Essential hypertension  Assessment and Plan of Treatment: Consistent with your past medical history, please continue to take your medications as prescribed and continue to follow up with your primary care physician for continued managment of this condition    Diagnosis: HLD (hyperlipidemia)  Assessment and Plan of Treatment: Consistent with your past medical history, please continue to take your medications as prescribed and continue to follow up with your primary care physician for continued managment of this condition    Diagnosis: Sick sinus syndrome  Assessment and Plan of Treatment: Consistent with your past medical history, please continue to take your medications as prescribed and continue to follow up with your Cardiologist for continued managment of this condition    Diagnosis: Discitis  Assessment and Plan of Treatment: You were found to have an infection of one of the intervertebral discs in your spine. You were started on antibiotics for the infection. Our Neurosurgery and Orthopedic team evaluated and recommended tno need for surgical intervention at this time.    Diagnosis: Abdominal distention  Assessment and Plan of Treatment: You were found to have some abdominal distention likely due to your immobility. We have put you on a bowel regimen to help move your bowels. However, please get out of bed and try to move around as much as possible.    Diagnosis: Spinal abscess  Assessment and Plan of Treatment: You were found to have a spinal abscess. You are currently being treated with IV antibiotics and an oral antibiotic for this at this time.    Diagnosis: Rhabdomyolysis  Assessment and Plan of Treatment: You were found to have some elevated markers on your blood work consistent with some muscle breakdown. We started you on IV fluids and the condition resolved. Please continue to follow up for continued monitoring of this condition PRINCIPAL DISCHARGE DIAGNOSIS  Diagnosis: MSSA (methicillin susceptible Staphylococcus aureus) infection  Assessment and Plan of Treatment: You were found to have bacteria in your blood stream due to the abscess in your back. You were treated with IV antibiotics and you will continue on IV antibiotic (Cefazolin) until May 28th. You are also on Rifampin (another antibiotic) because you have a pacemaker. Please follow up with Dr. Ann (the infectious disease doctor) as scheduled on 5/9/19 at 10am and with Dr. Carlisle on  5/21/19 at 10am.      SECONDARY DISCHARGE DIAGNOSES  Diagnosis: Dementia  Assessment and Plan of Treatment: You have a history of dementia for which you take galantamine. please continue taking this and follow up with Dr. Carlisle as needed.    Diagnosis: Essential hypertension  Assessment and Plan of Treatment: Consistent with your past medical history, please continue to take your medications as prescribed and continue to follow up with your primary care physician for continued managment of this condition    Diagnosis: HLD (hyperlipidemia)  Assessment and Plan of Treatment: Consistent with your past medical history, please continue to take your medications as prescribed and continue to follow up with your primary care physician for continued managment of this condition    Diagnosis: Sick sinus syndrome  Assessment and Plan of Treatment: Consistent with your past medical history, please continue to take your medications as prescribed and continue to follow up with your Cardiologist for continued managment of this condition    Diagnosis: Discitis  Assessment and Plan of Treatment: You were found to have an infection of one of the intervertebral discs in your spine. You were started on antibiotics for the infection. Our Neurosurgery and Orthopedic team evaluated and recommended tno need for surgical intervention at this time.    Diagnosis: Abdominal distention  Assessment and Plan of Treatment: You were found to have some abdominal distention likely due to your immobility. We have put you on a bowel regimen to help move your bowels. However, please get out of bed and try to move around as much as possible.    Diagnosis: Spinal abscess  Assessment and Plan of Treatment: You were found to have a spinal abscess. You are currently being treated with IV antibiotics and an oral antibiotic for this at this time.    Diagnosis: Rhabdomyolysis  Assessment and Plan of Treatment: You were found to have some elevated markers on your blood work consistent with some muscle breakdown. We started you on IV fluids and the condition resolved. Please continue to follow up for continued monitoring of this condition PRINCIPAL DISCHARGE DIAGNOSIS  Diagnosis: MSSA (methicillin susceptible Staphylococcus aureus) infection  Assessment and Plan of Treatment: You were found to have bacteria in your blood stream due to the abscess in your back. You were treated with IV antibiotics and you will continue on IV antibiotic (Cefazolin) until May 28th. You are also on Rifampin (another antibiotic) because you have a pacemaker. Please follow up with Dr. Ann (the infectious disease doctor) as scheduled on 5/9/19 at 10am and with Dr. Carlisle on  5/21/19 at 10am.      SECONDARY DISCHARGE DIAGNOSES  Diagnosis: Urinary retention  Assessment and Plan of Treatment: You were found to be unable to urinate on your own. You had a dias catheter placed to help remove your urine. You failed trial of voids meaning you could not urinate on your own after removal of dias cathether. The dias will now stay in and the rehab facility should try to see if you can urinate on your own.    Diagnosis: Dementia  Assessment and Plan of Treatment: You have a history of dementia for which you take galantamine. please continue taking this and follow up with Dr. Carlisle as needed.    Diagnosis: Essential hypertension  Assessment and Plan of Treatment: Consistent with your past medical history, please continue to take your medications as prescribed and continue to follow up with your primary care physician for continued managment of this condition    Diagnosis: HLD (hyperlipidemia)  Assessment and Plan of Treatment: Consistent with your past medical history, please continue to take your medications as prescribed and continue to follow up with your primary care physician for continued managment of this condition    Diagnosis: Sick sinus syndrome  Assessment and Plan of Treatment: Consistent with your past medical history, please continue to take your medications as prescribed and continue to follow up with your Cardiologist for continued managment of this condition    Diagnosis: Discitis  Assessment and Plan of Treatment: You were found to have an infection of one of the intervertebral discs in your spine. You were started on antibiotics for the infection. Our Neurosurgery and Orthopedic team evaluated and recommended tno need for surgical intervention at this time.    Diagnosis: Abdominal distention  Assessment and Plan of Treatment: You were found to have some abdominal distention likely due to your immobility. We have put you on a bowel regimen to help move your bowels. However, please get out of bed and try to move around as much as possible.    Diagnosis: Spinal abscess  Assessment and Plan of Treatment: You were found to have a spinal abscess. You are currently being treated with IV antibiotics and an oral antibiotic for this at this time.    Diagnosis: Rhabdomyolysis  Assessment and Plan of Treatment: You were found to have some elevated markers on your blood work consistent with some muscle breakdown. We started you on IV fluids and the condition resolved. Please continue to follow up for continued monitoring of this condition

## 2019-04-17 NOTE — PROGRESS NOTE ADULT - ASSESSMENT
89M with PMH of SSS s/p PPM, HTN, Glaucoma in both eyes (multiple surgeries at [facility]), pyogenic granuloma of eyelid s/p surgery, dementia presenting for b/l LE weakness and back pain in setting of altered mental status found to have MSSA bacteremia 2/2 epidural abscess and discitis

## 2019-04-18 DIAGNOSIS — J90 PLEURAL EFFUSION, NOT ELSEWHERE CLASSIFIED: ICD-10-CM

## 2019-04-18 DIAGNOSIS — R06.82 TACHYPNEA, NOT ELSEWHERE CLASSIFIED: ICD-10-CM

## 2019-04-18 DIAGNOSIS — G06.2 EXTRADURAL AND SUBDURAL ABSCESS, UNSPECIFIED: ICD-10-CM

## 2019-04-18 LAB
-  CEFAZOLIN: SIGNIFICANT CHANGE UP
-  CLINDAMYCIN: SIGNIFICANT CHANGE UP
-  ERYTHROMYCIN: SIGNIFICANT CHANGE UP
-  LINEZOLID: SIGNIFICANT CHANGE UP
-  OXACILLIN: SIGNIFICANT CHANGE UP
-  PENICILLIN: SIGNIFICANT CHANGE UP
-  RIFAMPIN: SIGNIFICANT CHANGE UP
-  TRIMETHOPRIM/SULFAMETHOXAZOLE: SIGNIFICANT CHANGE UP
-  VANCOMYCIN: SIGNIFICANT CHANGE UP
ALBUMIN SERPL ELPH-MCNC: 2.4 G/DL — LOW (ref 3.3–5)
ALP SERPL-CCNC: 79 U/L — SIGNIFICANT CHANGE UP (ref 40–120)
ALT FLD-CCNC: 35 U/L — SIGNIFICANT CHANGE UP (ref 10–45)
ANION GAP SERPL CALC-SCNC: 9 MMOL/L — SIGNIFICANT CHANGE UP (ref 5–17)
AST SERPL-CCNC: 42 U/L — HIGH (ref 10–40)
BASOPHILS # BLD AUTO: 0 K/UL — SIGNIFICANT CHANGE UP (ref 0–0.2)
BASOPHILS NFR BLD AUTO: 0 % — SIGNIFICANT CHANGE UP (ref 0–2)
BILIRUB SERPL-MCNC: 1.1 MG/DL — SIGNIFICANT CHANGE UP (ref 0.2–1.2)
BUN SERPL-MCNC: 18 MG/DL — SIGNIFICANT CHANGE UP (ref 7–23)
CALCIUM SERPL-MCNC: 8.4 MG/DL — SIGNIFICANT CHANGE UP (ref 8.4–10.5)
CHLORIDE SERPL-SCNC: 110 MMOL/L — HIGH (ref 96–108)
CO2 SERPL-SCNC: 24 MMOL/L — SIGNIFICANT CHANGE UP (ref 22–31)
CREAT SERPL-MCNC: 0.54 MG/DL — SIGNIFICANT CHANGE UP (ref 0.5–1.3)
CULTURE RESULTS: NO GROWTH — SIGNIFICANT CHANGE UP
EOSINOPHIL # BLD AUTO: 0.17 K/UL — SIGNIFICANT CHANGE UP (ref 0–0.5)
EOSINOPHIL NFR BLD AUTO: 0.9 % — SIGNIFICANT CHANGE UP (ref 0–6)
GLUCOSE BLDC GLUCOMTR-MCNC: 133 MG/DL — HIGH (ref 70–99)
GLUCOSE BLDC GLUCOMTR-MCNC: 135 MG/DL — HIGH (ref 70–99)
GLUCOSE BLDC GLUCOMTR-MCNC: 143 MG/DL — HIGH (ref 70–99)
GLUCOSE BLDC GLUCOMTR-MCNC: 146 MG/DL — HIGH (ref 70–99)
GLUCOSE SERPL-MCNC: 130 MG/DL — HIGH (ref 70–99)
HCT VFR BLD CALC: 36.7 % — LOW (ref 39–50)
HGB BLD-MCNC: 12 G/DL — LOW (ref 13–17)
LYMPHOCYTES # BLD AUTO: 0.69 K/UL — LOW (ref 1–3.3)
LYMPHOCYTES # BLD AUTO: 3.6 % — LOW (ref 13–44)
MAGNESIUM SERPL-MCNC: 2.2 MG/DL — SIGNIFICANT CHANGE UP (ref 1.6–2.6)
MCHC RBC-ENTMCNC: 29.5 PG — SIGNIFICANT CHANGE UP (ref 27–34)
MCHC RBC-ENTMCNC: 32.7 GM/DL — SIGNIFICANT CHANGE UP (ref 32–36)
MCV RBC AUTO: 90.2 FL — SIGNIFICANT CHANGE UP (ref 80–100)
METHOD TYPE: SIGNIFICANT CHANGE UP
MONOCYTES # BLD AUTO: 1.03 K/UL — HIGH (ref 0–0.9)
MONOCYTES NFR BLD AUTO: 5.4 % — SIGNIFICANT CHANGE UP (ref 2–14)
NEUTROPHILS # BLD AUTO: 17.07 K/UL — HIGH (ref 1.8–7.4)
NEUTROPHILS NFR BLD AUTO: 88.3 % — HIGH (ref 43–77)
PHOSPHATE SERPL-MCNC: 2 MG/DL — LOW (ref 2.5–4.5)
PLATELET # BLD AUTO: 164 K/UL — SIGNIFICANT CHANGE UP (ref 150–400)
POTASSIUM SERPL-MCNC: 3.4 MMOL/L — LOW (ref 3.5–5.3)
POTASSIUM SERPL-SCNC: 3.4 MMOL/L — LOW (ref 3.5–5.3)
PROT SERPL-MCNC: 5.4 G/DL — LOW (ref 6–8.3)
RBC # BLD: 4.07 M/UL — LOW (ref 4.2–5.8)
RBC # FLD: 14.4 % — SIGNIFICANT CHANGE UP (ref 10.3–14.5)
SODIUM SERPL-SCNC: 143 MMOL/L — SIGNIFICANT CHANGE UP (ref 135–145)
SPECIMEN SOURCE: SIGNIFICANT CHANGE UP
WBC # BLD: 19.14 K/UL — HIGH (ref 3.8–10.5)
WBC # FLD AUTO: 19.14 K/UL — HIGH (ref 3.8–10.5)

## 2019-04-18 PROCEDURE — 99223 1ST HOSP IP/OBS HIGH 75: CPT | Mod: GC

## 2019-04-18 PROCEDURE — 71045 X-RAY EXAM CHEST 1 VIEW: CPT | Mod: 26

## 2019-04-18 PROCEDURE — 99233 SBSQ HOSP IP/OBS HIGH 50: CPT | Mod: GC

## 2019-04-18 PROCEDURE — 78806: CPT | Mod: 26

## 2019-04-18 PROCEDURE — 99232 SBSQ HOSP IP/OBS MODERATE 35: CPT | Mod: GC

## 2019-04-18 RX ORDER — FUROSEMIDE 40 MG
20 TABLET ORAL ONCE
Qty: 0 | Refills: 0 | Status: COMPLETED | OUTPATIENT
Start: 2019-04-18 | End: 2019-04-18

## 2019-04-18 RX ORDER — POTASSIUM CHLORIDE 20 MEQ
40 PACKET (EA) ORAL EVERY 4 HOURS
Qty: 0 | Refills: 0 | Status: COMPLETED | OUTPATIENT
Start: 2019-04-18 | End: 2019-04-18

## 2019-04-18 RX ORDER — POTASSIUM PHOSPHATE, MONOBASIC POTASSIUM PHOSPHATE, DIBASIC 236; 224 MG/ML; MG/ML
15 INJECTION, SOLUTION INTRAVENOUS ONCE
Qty: 0 | Refills: 0 | Status: COMPLETED | OUTPATIENT
Start: 2019-04-18 | End: 2019-04-18

## 2019-04-18 RX ORDER — ATORVASTATIN CALCIUM 80 MG/1
20 TABLET, FILM COATED ORAL AT BEDTIME
Qty: 0 | Refills: 0 | Status: DISCONTINUED | OUTPATIENT
Start: 2019-04-18 | End: 2019-04-25

## 2019-04-18 RX ORDER — ACETAMINOPHEN 500 MG
650 TABLET ORAL EVERY 6 HOURS
Qty: 0 | Refills: 0 | Status: DISCONTINUED | OUTPATIENT
Start: 2019-04-18 | End: 2019-04-25

## 2019-04-18 RX ADMIN — HEPARIN SODIUM 5000 UNIT(S): 5000 INJECTION INTRAVENOUS; SUBCUTANEOUS at 22:20

## 2019-04-18 RX ADMIN — HEPARIN SODIUM 5000 UNIT(S): 5000 INJECTION INTRAVENOUS; SUBCUTANEOUS at 13:46

## 2019-04-18 RX ADMIN — Medication 2000 MILLIGRAM(S): at 02:08

## 2019-04-18 RX ADMIN — Medication 3 MILLILITER(S): at 19:20

## 2019-04-18 RX ADMIN — Medication 3 MILLILITER(S): at 11:41

## 2019-04-18 RX ADMIN — Medication 40 MILLIEQUIVALENT(S): at 13:45

## 2019-04-18 RX ADMIN — Medication 3 MILLILITER(S): at 06:20

## 2019-04-18 RX ADMIN — Medication 1 DROP(S): at 06:21

## 2019-04-18 RX ADMIN — Medication 1 DROP(S): at 19:20

## 2019-04-18 RX ADMIN — ATORVASTATIN CALCIUM 20 MILLIGRAM(S): 80 TABLET, FILM COATED ORAL at 22:20

## 2019-04-18 RX ADMIN — HEPARIN SODIUM 5000 UNIT(S): 5000 INJECTION INTRAVENOUS; SUBCUTANEOUS at 06:20

## 2019-04-18 RX ADMIN — Medication 3 MILLILITER(S): at 02:08

## 2019-04-18 RX ADMIN — Medication 3 MILLILITER(S): at 22:20

## 2019-04-18 RX ADMIN — Medication 650 MILLIGRAM(S): at 12:49

## 2019-04-18 RX ADMIN — POTASSIUM PHOSPHATE, MONOBASIC POTASSIUM PHOSPHATE, DIBASIC 62.5 MILLIMOLE(S): 236; 224 INJECTION, SOLUTION INTRAVENOUS at 11:42

## 2019-04-18 RX ADMIN — Medication 650 MILLIGRAM(S): at 13:19

## 2019-04-18 RX ADMIN — Medication 20 MILLIGRAM(S): at 14:37

## 2019-04-18 RX ADMIN — Medication 3 MILLILITER(S): at 13:46

## 2019-04-18 RX ADMIN — Medication 40 MILLIEQUIVALENT(S): at 11:42

## 2019-04-18 RX ADMIN — Medication 81 MILLIGRAM(S): at 11:41

## 2019-04-18 RX ADMIN — Medication 2000 MILLIGRAM(S): at 11:42

## 2019-04-18 RX ADMIN — Medication 2000 MILLIGRAM(S): at 19:20

## 2019-04-18 NOTE — PROGRESS NOTE ADULT - PROBLEM SELECTOR PLAN 1
Met severe sepsis criteria on admission T 102F, Leukocytosis 26, reported tachypnea. HR 90s. Lactate elevation to 4.9-> 3.2 -> 1.8. Found to have MSSA bacteremia secondary to discitis and T12/L1 epidural abscess. Continued bacteremia on 4/16 after T102F.   -ID recs appreciated  -Continue rifampin and Cefazolin  -Negative TTE for endocarditis  -Pending gallium  -Ortho spine recs with no surgical intervention at this time      #Abdominal Distension   Abdominal Xray with large stool burden, no evidence of perforation or obstruction. Leukocytosis improving. had 2 BMs since yesterday.  - Bowel Regimen - Miralax, Senna, Colace    #Tachypnea and Wheezing  No wheezing on physical exam this AM. TTE EF 60%, no vegetation. CXR  Mildly worsened bibasilar hazy opacities and worsening pleural effusion on R.   - Duonebs PRN  - Will give one time dose lasix 20mg IVP given worsening pleural effusion Met severe sepsis criteria on admission T 102F, Leukocytosis 26, reported tachypnea. HR 90s. Lactate elevation to 4.9-> 3.2 -> 1.8. Found to have MSSA bacteremia secondary to discitis and T12/L1 epidural abscess. Continued bacteremia on 4/16 after T102F.   -ID recs appreciated  -Continue rifampin and Cefazolin  -Negative TTE for endocarditis  -Pending gallium  -Ortho spine recs with no surgical intervention at this time      #Abdominal Distension   Abdominal Xray with large stool burden, no evidence of perforation or obstruction. Leukocytosis improving. had 2 BMs since yesterday.  - Bowel Regimen - Miralax, Senna, Colace    #Tachypnea and Wheezing  Persistent tachypnea but does not feel SOB. TTE EF 60%, no vegetation. CXR  Mildly worsened bibasilar hazy opacities and worsening pleural effusion on R. s/p 1 dose lasix 20mg IVP.  - Duonebs PRN  - pulm recs appreciated

## 2019-04-18 NOTE — DIETITIAN INITIAL EVALUATION ADULT. - PROBLEM SELECTOR PLAN 9
F: IVF s/p 2L bolus + maintenance at 125cc/hr  E: Replete electrolytes to maintain K>4 and Mg>2  N: NPO for potential IR biopsy

## 2019-04-18 NOTE — PROGRESS NOTE ADULT - SUBJECTIVE AND OBJECTIVE BOX
SUBJECTIVE / INTERVAL HPI: Persistent tachypnea o/n but asymptomatic. Given additional nebulizer tx. Patient had 5 BMs in last 24 hours.  Patient seen and examined at bedside. Patient denies any complaints at this time but reports feeling tired of being in the hospital. Otherwise: (-) fever, (-) chills, (-) dizziness, (-) headache, (-) neck pain, (-) chest pain, (-) palpitations, (-)SOB, (-) nausea, (-) vomiting, (-) diarrhea, (-) abdominal pain, (-)new weakness, (-) paresthesias.      VITAL SIGNS:  Vital Signs Last 24 Hrs  T(C): 36.5 (2019 05:33), Max: 37.8 (2019 21:29)  T(F): 97.7 (2019 05:33), Max: 100 (2019 21:29)  HR: 85 (2019 05:33) (81 - 97)  BP: 161/82 (2019 05:33) (120/72 - 164/79)  BP(mean): --  RR: 22 (2019 05:33) (20 - 30)  SpO2: 94% (2019 05:33) (94% - 97%)    PHYSICAL EXAM:    General: ill appearing, resting in bed and in no acute distress though noted to be tachypneic-improved compared to yesterday  HEENT: normocephalic, atraumatic, PERRL, anicteric sclera; no conjunctival pallor, mucus membranes moist  Neck: supple, no masses, No JVD  Cardiovascular: +S1/S2, regular rate and rhythm; no murmurs, no rub, no gallop  Respiratory:, no rhonchi, no wheeze, no rales. Minimal rales in R mid lung field.   Gastrointestinal: soft, active bowel sounds, (+) b/l upper quadrant tenderness w/ deep palpation,  (+) distended  Rectal performed yesterday PM after BM: sensation intact, slightly decreased tone  Extremities: Warm, dry; no edema, clubbing or cyanosis  Vascular: 2+ radial, DP pulses bilaterally  Neurological: AAOx3-oriented to self, location, and date this AM; no focal deficits. BLE strength: 4/5. Sensation intact throughout.     MEDICATIONS:  MEDICATIONS  (STANDING):  ALBUTerol/ipratropium for Nebulization 3 milliLiter(s) Nebulizer every 4 hours  aspirin  chewable 81 milliGRAM(s) Oral daily  ceFAZolin  Injectable. 2000 milliGRAM(s) IV Push every 8 hours  dextrose 5%. 1000 milliLiter(s) (50 mL/Hr) IV Continuous <Continuous>  dextrose 50% Injectable 12.5 Gram(s) IV Push once  dextrose 50% Injectable 25 Gram(s) IV Push once  dextrose 50% Injectable 25 Gram(s) IV Push once  heparin  Injectable 5000 Unit(s) SubCutaneous every 8 hours  insulin lispro (HumaLOG) corrective regimen sliding scale   SubCutaneous Before meals and at bedtime  potassium chloride    Tablet ER 40 milliEquivalent(s) Oral every 4 hours  potassium phosphate IVPB 15 milliMole(s) IV Intermittent once  rifampin 600 milliGRAM(s) Oral daily  timolol 0.5% Solution 1 Drop(s) Both EYES every 12 hours    MEDICATIONS  (PRN):  acetaminophen   Tablet .. 650 milliGRAM(s) Oral every 6 hours PRN Temp greater or equal to 38C (100.4F)  dextrose 40% Gel 15 Gram(s) Oral once PRN Blood Glucose LESS THAN 70 milliGRAM(s)/deciliter  glucagon  Injectable 1 milliGRAM(s) IntraMuscular once PRN Glucose LESS THAN 70 milligrams/deciliter      ALLERGIES:  Allergies    No Known Allergies    Intolerances        LABS:                        12.0   19.14 )-----------( 164      ( 2019 06:46 )             36.7     04-18    143  |  110<H>  |  18  ----------------------------<  130<H>  3.4<L>   |  24  |  0.54    Ca    8.4      2019 06:46  Phos  2.0     04-18  Mg     2.2     04-18    TPro  5.4<L>  /  Alb  2.4<L>  /  TBili  1.1  /  DBili  x   /  AST  42<H>  /  ALT  35  /  AlkPhos  79  04-18      Urinalysis Basic - ( 2019 10:48 )    Color: Yellow / Appearance: Clear / S.015 / pH: x  Gluc: x / Ketone: NEGATIVE  / Bili: Negative / Urobili: 0.2 E.U./dL   Blood: x / Protein: 30 mg/dL / Nitrite: NEGATIVE   Leuk Esterase: Trace / RBC: > 10 /HPF / WBC < 5 /HPF   Sq Epi: x / Non Sq Epi: 0-5 /HPF / Bacteria: Present /HPF      CAPILLARY BLOOD GLUCOSE      POCT Blood Glucose.: 133 mg/dL (2019 08:02)      RADIOLOGY & ADDITIONAL TESTS: Reviewed.

## 2019-04-18 NOTE — PROGRESS NOTE ADULT - ASSESSMENT
89M with PMH of SSS s/p PPM, L4-S1 post spinal fusion, L2-S1 laminectomy, HTN, Glaucoma, pyogenic granuloma of eyelid s/p surgery, dementia presenting for b/l LE weakness and back pain, found to have T12-L1 discitis and MSSA BSI.      - TTE with AV thickening, could not visualize right heart adequately(including pacemaker leads)  - surveillance blood cultures 4/16: NGTD  - c/w cefazolin 2g IV q8h  - c/w rifampin 600mg PO q24h for biofilm penetration given hardware  - continue above regimen for at least 6 weeks through 5/26/19(no final recs as unclear if there is source control)  - at this time patient did not want SUSANA or any invasive procedures so proceeding with gallium scan   - pending gallium scan    ID team 1 to follow

## 2019-04-18 NOTE — PROGRESS NOTE ADULT - PROBLEM SELECTOR PLAN 4
Resolving  Presented w/ /ALT 47, initial TBili 1.6 with Alk Phos 182, likely in setting of rhabdomyolysis   -Hep panel negative

## 2019-04-18 NOTE — DIETITIAN INITIAL EVALUATION ADULT. - PROBLEM SELECTOR PLAN 10
Reason for call:  Home Healthcare Reason for Call:  Home Health Care    LARRY with [facility] called regarding (reason for call): VERBAL ORDER    Orders are needed for this patient.     PT: 2 TIMES A WEEK FOR 4 WEEKS    OT:     Skilled Nursing:     Pt Provider: MELISSA    Phone Number Homecare Nurse can be reached at: [phone]    Can we leave a detailed message on this number? YES    Phone number patient can be reached at:     Best Time: ANY    Call taken on 10/18/2018 at 12:41 PM by Toya Bernal      Phone number to reach patient:      Best Time:      Can we leave a detailed message on this number?     VTE: No HSQ pending potential biopsy    DNR/DNI (MOLST in chart)    Dispo: Admit to [facility] for severe sepsis for Discitis, rhabdomyolysis

## 2019-04-18 NOTE — DIETITIAN INITIAL EVALUATION ADULT. - PROBLEM SELECTOR PLAN 3
Has not been walking much per patient. CK at 7744.  -c/w IVF at 125cc/hr, unknown EF, will continue to monitor fluid status  -repeat CK in AM

## 2019-04-18 NOTE — PROGRESS NOTE ADULT - SUBJECTIVE AND OBJECTIVE BOX
Physical Medicine and Rehabilitation Progress Note:    Patient is a 89y old  Male who presents with a chief complaint of Weakness, Altered mental status (18 Apr 2019 10:49)      HPI:  89M with PMH of SSS s/p PPM, HTN, Glaucoma in both eyes (multiple surgeries at [facility]), pyogenic granuloma of eyelid s/p surgery, dementia presenting for b/l LE weakness and back pain in setting of altered mental status. Patient initially unsure why he was brought into ED but upon asking about symptoms of back pain and LE weakness- he states over last week developed progressively worsening lower back pain, no radiation of pain. Patient states he has had hx of LBP for which has had surgery in the past (confirmed on imaging that has prior posterior spinal fusion from L4 to S1 and prior L2-S1 laminectomy.). Reports also progressively worsening weakness for which he was unable to get out of bed today.     Upon arrival to ED, VS /71, HR 88, RR 17, T 98.4F -> later spiked to 102F rectal, O2 95% on RA. Labs notable for WBC 26.35, /ALT 47, initial TBili 1.6 with Alk Phos 182. Cr at 0.92, Lactate at 4.9. CK at 7744. Noted to be tachypneic for which placed on HFNC. CXR with questionable LLL infiltrate. CT lumbar spine with finding concerning for early discitis and/or osteomyelitis at T12-L1 level with no definitive epidural abscess. ICU Consulted for altered mental status and weakness. Ortho consulted in setting of finding concerning for discitis. MRI performed-initially with low suspicion for discitis/osteo. Therefore per ortho no intervention. Upon re-evaluation MRI demonstrated abnormal signal at T12-L1 disc with fluid expanding the disc space (i.e. lining of the disc space, especially anteriorly). No associated significant endplate erosion or marrow edema. Findings are again concerning for discitis at T12-L1 (without significant osteomyelitis at this time). Ortho reconsulted for which plan for IR biopsy but no other intervention at this time.  Patient reports that pain located in lower back above sacrum, no radiation of pain. Says he has had abdominal pain that he states sometimes is confused with his back pain. No changes in bowel habits. No dysuria- + incontinence. Denies recent fevers chills. No trauma. (14 Apr 2019 01:45)                            12.0   19.14 )-----------( 164      ( 18 Apr 2019 06:46 )             36.7       04-18    143  |  110<H>  |  18  ----------------------------<  130<H>  3.4<L>   |  24  |  0.54    Ca    8.4      18 Apr 2019 06:46  Phos  2.0     04-18  Mg     2.2     04-18    TPro  5.4<L>  /  Alb  2.4<L>  /  TBili  1.1  /  DBili  x   /  AST  42<H>  /  ALT  35  /  AlkPhos  79  04-18    Vital Signs Last 24 Hrs  T(C): 37.2 (18 Apr 2019 14:00), Max: 37.8 (17 Apr 2019 21:29)  T(F): 98.9 (18 Apr 2019 14:00), Max: 100 (17 Apr 2019 21:29)  HR: 75 (18 Apr 2019 14:00) (70 - 93)  BP: 135/78 (18 Apr 2019 14:00) (135/78 - 164/79)  BP(mean): --  RR: 22 (18 Apr 2019 14:00) (22 - 30)  SpO2: 96% (18 Apr 2019 14:00) (93% - 98%)    MEDICATIONS  (STANDING):  ALBUTerol/ipratropium for Nebulization 3 milliLiter(s) Nebulizer every 4 hours  aspirin  chewable 81 milliGRAM(s) Oral daily  atorvastatin 20 milliGRAM(s) Oral at bedtime  ceFAZolin  Injectable. 2000 milliGRAM(s) IV Push every 8 hours  dextrose 5%. 1000 milliLiter(s) (50 mL/Hr) IV Continuous <Continuous>  dextrose 50% Injectable 12.5 Gram(s) IV Push once  dextrose 50% Injectable 25 Gram(s) IV Push once  dextrose 50% Injectable 25 Gram(s) IV Push once  heparin  Injectable 5000 Unit(s) SubCutaneous every 8 hours  insulin lispro (HumaLOG) corrective regimen sliding scale   SubCutaneous Before meals and at bedtime  rifampin 600 milliGRAM(s) Oral daily  timolol 0.5% Solution 1 Drop(s) Both EYES every 12 hours    MEDICATIONS  (PRN):  acetaminophen   Tablet .. 650 milliGRAM(s) Oral every 6 hours PRN Temp greater or equal to 38C (100.4F), Mild Pain (1 - 3)  dextrose 40% Gel 15 Gram(s) Oral once PRN Blood Glucose LESS THAN 70 milliGRAM(s)/deciliter  glucagon  Injectable 1 milliGRAM(s) IntraMuscular once PRN Glucose LESS THAN 70 milligrams/deciliter    Currently Undergoing Physical Therapy at bedside.    Functional Status Assessment:    Pain Assessment/Number Scale (0-10) Adult  Presence of Pain: complains of pain/discomfort  Body Location: lower   back  Pain Rating (0-10): Rest: 2   Pain Rating (0-10): Activity: 3     Therapeutic Interventions      Bed Mobility  Bed Mobility Training Symptoms Noted During/After Treatment: none  Bed Mobility Training Scooting: maximum assist (25% patient effort);  1 person assist;  verbal cues  Bed Mobility Training Sit-to-Supine: maximum assist (25% patient effort);  2 person assist;  verbal cues  Bed Mobility Training Supine-to-Sit: maximum assist (25% patient effort);  2 person assist  Bed Mobility Training Limitations: decreased ability to use legs for bridging/pushing;  decreased ability to use arms for pushing/pulling;  impaired ability to control trunk for mobility;  decreased strength;  impaired postural control;  pain    Sit-Stand Transfer Training  Sit-to-Stand Transfer Training Symptoms Noted During/After Treatment: fatigue;  shortness of breath  Transfer Training Sit-to-Stand Transfer: maximum assist (25% patient effort);  dependent (less than 25% patient effort);  2 person assist;  bilateral axillary and lumbar support to promote hip extension   Transfer Training Stand-to-Sit Transfer: maximum assist (25% patient effort);  2 person assist  Sit-to-Stand Transfer Training Transfer Safety Analysis: decreased balance;  decreased weight-shifting ability;  decreased strength;  impaired balance;  impaired postural control;  pain    Gait Training  Gait Training: unable to perform secondary to poor static standing tolerance requiring dependency of 2 to perform standing trials    Therapeutic Exercise  Therapeutic Exercise Detail: ankle pumps, quad sets, glute sets, heel slides x 10 bilaterally             PM&R Impression: as above    Disposition Plan Recommendations: subacute rehab placement

## 2019-04-18 NOTE — PROGRESS NOTE ADULT - PROBLEM SELECTOR PLAN 7
Hx of HLD on home Lipitor  -Hold lipitor in setting of rhabdo, will restart tomorrow Hx of HLD on home Lipitor  -continue home lipitor 20mg

## 2019-04-18 NOTE — DIETITIAN INITIAL EVALUATION ADULT. - ENERGY NEEDS
ABW used for calculations as pt between % of IBW.   ABW 83kg, IBW 75kg, 110% IBW, ht 70", BMI 26.3   Nutrient needs based on [facility] standards of care for repletion in older adults, adjusted for sepsis/ infection

## 2019-04-18 NOTE — DIETITIAN INITIAL EVALUATION ADULT. - OTHER INFO
89M with PMH of SSS s/p PPM, HTN, Glaucoma in both eyes (multiple surgeries at [facility]), pyogenic granuloma of eyelid s/p surgery, dementia presenting for b/l LE weakness and back pain in setting of altered mental status found to have severe sepsis 2/2 MSSA bacteremia 2/2 epidural abscess and discitis. Pending gallium scan today, s/p cxr showing worsening PE. Mental status slowly returning to baseline per report however still A&Ox1-2. Diarrhea noted, denies n/v/c, chewing/ swallowing issues or pain impacting intake, skin is bruised/ ecchymotic. NKFA noted. Wt has been stable PTA. Fair-poor intake noted at this time, encouraged intake, will continue to follow per protocol.

## 2019-04-18 NOTE — PROGRESS NOTE ADULT - PROBLEM SELECTOR PLAN 3
Resolved. Has not been walking much per patient. CK at 7744 downtrending on AM labs  will continue to monitor fluid status

## 2019-04-18 NOTE — DIETITIAN INITIAL EVALUATION ADULT. - PROBLEM SELECTOR PLAN 2
Most likely etiology secondary to ongoing infection- considering patient came in with severe sepsis. With resuscitation patient able to better respond to questions.  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   -Vitamin deficiencies: obtain  B12, folate level

## 2019-04-18 NOTE — CONSULT NOTE ADULT - SUBJECTIVE AND OBJECTIVE BOX
PULMONOLOGY CONSULT NOTE  Patient is a 89yo M with a PMHx of SSS s/p PPM, HTN, Glaucoma, pyogenic granuloma of eyelid, dementia who presented to [facility] on 19 with severe sepsis and TME with Rhabdomyolysis and  b/l LE weakness and back pain due to MSSA bacteremia from T12-L1 epidural abscess and discitis. Patient undergoing medical therapy as orthopedic team recommending no surgical intervention at this time. Work-up underway for further sources of MSSA with gallium scan. CT abdomen notable for small b/l pleural effusions with consolidation/atelectasis. Serial CXR notable for worsening effusion with consolidation and/or plate-like atelectasis of RLL. Pulmonology consulted for worsening tachypnea in the absence of subjective shortness of breath throughout hospitalization with gradually increasing O2 requirements.     REVIEW OF SYSTEMS:  CONSTITUTIONAL: No fever, weight loss, or fatigue  EYES: No eye pain, visual disturbances, or discharge  ENMT:  No difficulty hearing, tinnitus, vertigo; No sinus or throat pain  NECK: No pain or stiffness  RESPIRATORY: No cough, wheezing, chills or hemoptysis; No shortness of breath  CARDIOVASCULAR: No chest pain, palpitations, dizziness, or leg swelling  GASTROINTESTINAL: No abdominal or epigastric pain. No nausea, vomiting, or hematemesis; No diarrhea or constipation. No melena or hematochezia.  GENITOURINARY: No dysuria, frequency, hematuria, or incontinence  NEUROLOGICAL: No headaches, memory loss, loss of strength, numbness, or tremors  SKIN: No itching, burning, rashes, or lesions   LYMPH NODES: No enlarged glands  ENDOCRINE: No heat or cold intolerance; No hair loss  MUSCULOSKELETAL: No joint pain or swelling; No muscle, back, or extremity pain  PSYCHIATRIC: No depression, anxiety, mood swings, or difficulty sleeping  HEME/LYMPH: No easy bruising, or bleeding gums  ALLERY AND IMMUNOLOGIC: No hives or eczema    PHYSICAL EXAM   Vital Signs Last 24 Hrs  T(C): 36.9 (2019 09:00), Max: 37.8 (2019 21:29)  T(F): 98.4 (2019 09:00), Max: 100 (2019 21:29)  HR: 80 (2019 09:00) (80 - 93)  BP: 161/91 (2019 09:00) (120/72 - 164/79)  BP(mean): --  RR: 28 (2019 09:00) (20 - 28)  SpO2: 93% (2019 09:00) (93% - 96%)    Constitutional: NAD, A&Ox3  Eyes: PERRLA, EOMI  ENMT: MMM, No lesions  Neck: No JVD, No LAD  Respiratory: CTA b/l, no crackles, wheezing  Cardiovascular: RRR, no murmurs, rubs, gallops  Gastrointestinal: Soft, NTND, +BS  Extremities: Warm, no clubbing, cyanosis, or edema  Vascular: 2+ distal pulses, equal  Neurological: CN II-XII grossly intact  Musculoskeletal: 5/5 strength x 4 extremities    LABS                        12.0   19.14 )-----------( 164      ( 2019 06:46 )             36.7     04-18    143  |  110<H>  |  18  ----------------------------<  130<H>  3.4<L>   |  24  |  0.54    Ca    8.4      2019 06:46  Phos  2.0     04-18  Mg     2.2     04-18    TPro  5.4<L>  /  Alb  2.4<L>  /  TBili  1.1  /  DBili  x   /  AST  42<H>  /  ALT  35  /  AlkPhos  79  04-18        Urinalysis Basic - ( 2019 10:48 )    Color: Yellow / Appearance: Clear / S.015 / pH: x  Gluc: x / Ketone: NEGATIVE  / Bili: Negative / Urobili: 0.2 E.U./dL   Blood: x / Protein: 30 mg/dL / Nitrite: NEGATIVE   Leuk Esterase: Trace / RBC: > 10 /HPF / WBC < 5 /HPF   Sq Epi: x / Non Sq Epi: 0-5 /HPF / Bacteria: Present /HPF            IMAGING   Reviewed PULMONOLOGY CONSULT NOTE  Patient is a 89yo M with a PMHx of SSS s/p PPM, HTN, Glaucoma, pyogenic granuloma of eyelid, dementia who presented to [facility] on 19 with severe sepsis and TME with Rhabdomyolysis and  b/l LE weakness and back pain due to MSSA bacteremia from T12-L1 epidural abscess and discitis. Patient undergoing medical therapy as orthopedic team recommending no surgical intervention at this time. Work-up underway for further sources of MSSA with gallium scan. CT abdomen notable for small b/l pleural effusions with consolidation/atelectasis. Serial CXR notable for worsening effusion with consolidation and/or plate-like atelectasis of RLL. Pulmonology consulted for worsening tachypnea in the absence of subjective shortness of breath throughout hospitalization with gradually increasing O2 requirements. Patient seen and examined at bedside. Patient is an unreliable historian, but does not complain of any chest pain, cough, sputum production, subjective shortness of breath or increased respiratory effort, abdominal pain or distention, constipation, or lower extremity edema. Patient admits to being a former smoker, smoking roughly 1ppd for 4-5 years, and states he quit "many, many years ago". Does not recall his home medications.    REVIEW OF SYSTEMS:  CONSTITUTIONAL: No fever, weight loss, or fatigue  EYES: No eye pain, visual disturbances, or discharge  ENMT: Difficulty hearing, tinnitus, vertigo; No sinus or throat pain; no PND  NECK: No pain or stiffness  RESPIRATORY: No cough, wheezing, chills or hemoptysis; No shortness of breath  CARDIOVASCULAR: No chest pain, palpitations, dizziness, or leg swelling  GASTROINTESTINAL: No abdominal or epigastric pain. No nausea, vomiting, or hematemesis; No diarrhea or constipation. No melena or hematochezia.  GENITOURINARY: No dysuria, frequency, hematuria, or incontinence  NEUROLOGICAL: History of dementia, unclear baseline, patient unreliable historian  SKIN: No itching, burning, rashes, or lesions   LYMPH NODES: No enlarged glands  ENDOCRINE: No heat or cold intolerance; No hair loss  MUSCULOSKELETAL: No joint pain or swelling; No muscle, back, or extremity pain  PSYCHIATRIC: No depression, anxiety, mood swings, or difficulty sleeping  HEME/LYMPH: No easy bruising, or bleeding gums  ALLERY AND IMMUNOLOGIC: No hives or eczema    PHYSICAL EXAM   Vital Signs Last 24 Hrs  T(C): 36.9 (2019 09:00), Max: 37.8 (2019 21:29)  T(F): 98.4 (2019 09:00), Max: 100 (2019 21:29)  HR: 80 (2019 09:00) (80 - 93)  BP: 161/91 (2019 09:00) (120/72 - 164/79)  BP(mean): --  RR: 28 (2019 09:00) (20 - 28)  SpO2: 93% (2019 09:00) (93% - 96%)    Constitutional: NAD, A&Ox2  Eyes: PERRLA, EOMI; R eyelid lag noted  ENMT: MMM, purplish lesion on R lower lip  Neck: No JVD, No LAD  Respiratory: Decreased air entry with bronchial breath sounds in b/l mid lung fields, no egophony, decreased BS in b/l bases  Cardiovascular: RRR, no murmurs, rubs, gallops  Gastrointestinal: Mildly distended and tympanic, NT  Extremities: Warm, no clubbing, cyanosis, 1+ non-pitting edema b/l  Vascular: 2+ distal pulses, equal  Neurological: CN II-XII grossly intact  Musculoskeletal: 5/5 strength x 4 extremities    LABS                        12.0   19.14 )-----------( 164      ( 2019 06:46 )             36.7     04-18    143  |  110<H>  |  18  ----------------------------<  130<H>  3.4<L>   |  24  |  0.54    Ca    8.4      2019 06:46  Phos  2.0     04-18  Mg     2.2     04-18    TPro  5.4<L>  /  Alb  2.4<L>  /  TBili  1.1  /  DBili  x   /  AST  42<H>  /  ALT  35  /  AlkPhos  79  04-18        Urinalysis Basic - ( 2019 10:48 )    Color: Yellow / Appearance: Clear / S.015 / pH: x  Gluc: x / Ketone: NEGATIVE  / Bili: Negative / Urobili: 0.2 E.U./dL   Blood: x / Protein: 30 mg/dL / Nitrite: NEGATIVE   Leuk Esterase: Trace / RBC: > 10 /HPF / WBC < 5 /HPF   Sq Epi: x / Non Sq Epi: 0-5 /HPF / Bacteria: Present /HPF            IMAGING   Reviewed

## 2019-04-18 NOTE — PROGRESS NOTE ADULT - ASSESSMENT
89M with PMH of SSS s/p PPM, HTN, Glaucoma in both eyes (multiple surgeries at [facility]), pyogenic granuloma of eyelid s/p surgery, dementia presenting for b/l LE weakness and back pain in setting of altered mental status found to have MSSA bacteremia 2/2 epidural abscess and discitis        Problem/Plan - 1:  ·  Problem: Severe sepsis.  Plan: Met severe sepsis criteria on admission T 102F, Leukocytosis 26, reported tachypnea. HR 90s. Lactate elevation to 4.9-> 3.2 -> 1.8. Found to have MSSA bacteremia secondary to discitis and T12/L1 epidural abscess. Continued bacteremia on 4/16 after T102F.   -ID recs appreciated  -Continue rifampin and Cefazolin  -Negative TTE for endocarditis  -Pending gallium  -Ortho spine recs with no surgical intervention at this time      #Abdominal Distension   Abdominal Xray with large stool burden, no evidence of perforation or obstruction. Leukocytosis improving. had 2 BMs since yesterday.  - Bowel Regimen - Miralax, Senna, Colace    #Tachypnea and Wheezing  Persistent tachypnea but does not feel SOB. TTE EF 60%, no vegetation. CXR  Mildly worsened bibasilar hazy opacities and worsening pleural effusion on R. s/p 1 dose lasix 20mg IVP.  - Duonebs PRN  - pulm recs appreciated.     Problem/Plan - 2:  ·  Problem: Metabolic encephalopathy.  Plan: Most likely etiology secondary to ongoing infection- considering patient came in with severe sepsis. With resuscitation patient able to better respond to questions.  - Infection: c/w Cefazolin and rifampin for MSSA bacteremia likely primary cause of encephalopathy  - Vascular: CTH negative for acute intracranial hemorrhage or infarct but noted chronic microangiopathic disease including bilateral lacunar infarcts, If no   - Vitamin B12 and Folate WNL    ##urinary retention  Failed TOV x3, dias replaced, UA sent off w/o evidence of UTI.     Problem/Plan - 3:  ·  Problem: Rhabdomyolysis.  Plan: Resolved. Has not been walking much per patient. CK at 7744 downtrending on AM labs  will continue to monitor fluid status.     Problem/Plan - 4:  ·  Problem: Transaminitis.  Plan: Resolving  Presented w/ /ALT 47, initial TBili 1.6 with Alk Phos 182, likely in setting of rhabdomyolysis   -Hep panel negative.     Problem/Plan - 5:  ·  Problem: Back pain.  Plan: Noted back pain on presentation. Initial CT concerning for findings of developing osteo or discitis. MRI with small epidural abscess and discitis. past surgical hx of posterior spinal fusion from L4 to S1 and prior L2-S1 laminectomy, found to have disc bulging T12 L1 on MRI likely source of pain  - F/u Ortho-spine - no intervention at this time  - Neuro recs.     Problem/Plan - 6:  Problem: Essential hypertension. Plan: Hx of HTN on amlodipine and losartan  - Hold in setting of sepsis  - Plan to restart as BP tolerates.    Problem/Plan - 7:  ·  Problem: HLD (hyperlipidemia).  Plan: Hx of HLD on home Lipitor  -continue home lipitor 20mg.     Problem/Plan - 8:  ·  Problem: Sick sinus syndrome.  Plan: Hx of SSS s/p PPM, PPM interrogated on presentation and changed into MRI mode to evaluate back.

## 2019-04-18 NOTE — CONSULT NOTE ADULT - PROBLEM SELECTOR RECOMMENDATION 9
Pulmonology consulted for tachypnea with increased oxygen requirements from baseline. Vitals reviewed. Patient intermittently tachypneic as high as 30 respirations per minute, and has been on 2L NC since admission, now titrated up to 3L. Etiology likely multifactorial. CXR and Abdominal CT showing evidence of b/l Pleural effusions with R sided consolidation/atelectasis leading to formation of dead space. Patient also with back pain at thoracolumbar junction and abdominal distention leading to splinting and shallow breaths decreasing tidal volume.  - Incentive Spirometry use 6x/hour while awake  - Patient on Aspirin and HSQ, no known hx of DVT or blood clot - Well's score of 1.5 for immobilization - low risk for   - Optimized pain control and bowel regimen -Pulmonology consulted for tachypnea with increased oxygen requirements from baseline. Vitals reviewed. Patient intermittently tachypneic as high as 30 respirations per minute, and has been on 2L NC since admission, now titrated up to 3L.   -Etiology likely multifactorial. CXR and Abdominal CT showing evidence of b/l Pleural effusions with R sided consolidation/atelectasis leading to formation of dead space and worsening VQ mismatch. Patient also with back pain at thoracolumbar junction and abdominal distention leading to splinting and shallow breaths decreasing tidal volume.    Recommend:  - Incentive Spirometry use 10x/hour while awake, this should be strongly enforced.   -Pt needs to have PT which he will strongly benefit from, he needs to ambulate as much as possible, and he needs to sit in the chair as much as possible.   - Optimized pain control and bowel regimen

## 2019-04-18 NOTE — DIETITIAN INITIAL EVALUATION ADULT. - PROBLEM SELECTOR PLAN 5
Noted back pain on presentation. Initial CT concerning for findings of developing osteo or discitis. MRI with lower suspicion for epidural abscess or discitis. In setting of hx of posterior spinal fusion from L4 to S1 and prior L2-S1 laminectomy, may be residual from past surgery vs potential infection given fever/septic on presentation  -f/u BCx  -Obtain IR biopsy to r/o discitis  -F/u Ortho-spine recs  -Neuro consult, f/u recs

## 2019-04-18 NOTE — CONSULT NOTE ADULT - PROBLEM SELECTOR RECOMMENDATION 2
Patient with b/l pleural effusions seen on Abdominal CT. Etiology unclear. Given bilateral distribution, likely transudative process. While patient has history of SSS, no hx of heart failure - recent echocardiogram noted. May be related to fluid resuscitation in the setting of patient's rhabdomyolysis on presentation. No history of liver disease - LFTs on admission again likely related to concomitant   - f/u Gallium scan results to look for evidence of infection in chest  - Continue to monitor progression as renal function/hepatic function continues to improve  - No role for thoracentesis at this time    Further care per primary team -Patient with b/l pleural effusions seen on Abdominal CT.   -POCUS done today shows small bilateral pleural effusions with L>R that are simple and free flowing. There was also significant consolidation bilaterally which most likely represents atelectasis.   -The etiology of the fluid is likely volume overload as it appears to be improving with lasix.     Recommend  -No role for thoracentesis at this time  -Would recommend PT and ambulation, OOB to chair as noted above.   -Would recommend another dose of 20mg IV lasix.

## 2019-04-18 NOTE — PROGRESS NOTE ADULT - SUBJECTIVE AND OBJECTIVE BOX
INFECTIOUS DISEASE CONSULT PROGRESS NOTE    INTERVAL HPI/OVERNIGHT EVENTS: No acute events overnight. Patient is tired of being in the hospital and feels that he is getting weaker. No specific complaints except for improving back pain. ROS otherwise negative.     ACTIVE ANTIMICROBIALS/ANTIBIOTICS:  ceFAZolin  Injectable. 2000 milliGRAM(s) IV Push every 8 hours  rifampin 600 milliGRAM(s) Oral daily      VITAL SIGNS:  ICU Vital Signs Last 24 Hrs  T(C): 36.5 (2019 05:33), Max: 37.8 (2019 21:29)  T(F): 97.7 (2019 05:33), Max: 100 (2019 21:29)  HR: 85 (2019 05:33) (81 - 97)  BP: 161/82 (2019 05:33) (120/72 - 164/79)  BP(mean): --  ABP: --  ABP(mean): --  RR: 22 (2019 05:33) (20 - 30)  SpO2: 94% (2019 05:33) (94% - 97%)      PHYSICAL EXAM:  Constitutional: WDWN  Head: NC/AT  Eyes: PERRL, anicteric sclera  ENMT: no rhinorrhea; clear oropharynx; MMM  Neck: supple  Respiratory: audible wheezing b/l  Cardiovascular: +S1/S2, RRR  Gastrointestinal: soft, NT/ND; intact BS  Extremities: WWP; no clubbing, cyanosis, or edema  Vascular: 2+ radial, DP/PT pulses B/L  Dermatologic: skin warm and dry; no visible rashes or lesions  Neurologic: AAOx3, 3/5 strength in lower extremities with normal sensation, 5/5 in upper extremities     LABS:                        11.8   16.02 )-----------( 143      ( 2019 06:29 )             35.9       04-17    144  |  112<H>  |  32<H>  ----------------------------<  144<H>  3.6   |  21<L>  |  0.69    Ca    8.9      2019 06:29  Phos  1.6     04-17  Mg     2.4     04-17    TPro  5.8<L>  /  Alb  2.2<L>  /  TBili  1.1  /  DBili  x   /  AST  71<H>  /  ALT  53<H>  /  AlkPhos  91      Urinalysis Basic - ( 2019 10:48 )    Color: Yellow / Appearance: Clear / S.015 / pH: x  Gluc: x / Ketone: NEGATIVE  / Bili: Negative / Urobili: 0.2 E.U./dL   Blood: x / Protein: 30 mg/dL / Nitrite: NEGATIVE   Leuk Esterase: Trace / RBC: > 10 /HPF / WBC < 5 /HPF   Sq Epi: x / Non Sq Epi: 0-5 /HPF / Bacteria: Present /HPF          MICROBIOLOGY:    Culture - Blood (collected 19 @ 17:54)  Source: .Blood Blood-Venous  Preliminary Report (19 @ 18:01):    No growth at 1 day.    Culture - Blood (collected 19 @ 17:54)  Source: .Blood Blood  Preliminary Report (19 @ 18:01):    No growth at 1 day.    Culture - Blood (collected 04-15-19 @ 19:54)  Source: .Blood Blood-Venous  Gram Stain (19 @ 17:06):    Aerobic Bottle: Gram Positive Cocci in Clusters    Result called to and read back by_ Ms. Manuela Vazquez RN(4UR)    2019 17:05:51  Preliminary Report (19 @ 11:06):    Growth in aerobic bottle: Staphylococcus aureus    Susceptibility to follow.        RADIOLOGY & ADDITIONAL STUDIES: INFECTIOUS DISEASE CONSULT PROGRESS NOTE    INTERVAL HPI/OVERNIGHT EVENTS: No acute events overnight. Patient is tired of being in the hospital and feels that he is getting weaker. No specific complaints except for improving back pain. ROS otherwise negative. Pending galium scan    ACTIVE ANTIMICROBIALS/ANTIBIOTICS:  ceFAZolin  Injectable. 2000 milliGRAM(s) IV Push every 8 hours  rifampin 600 milliGRAM(s) Oral daily      VITAL SIGNS:  ICU Vital Signs Last 24 Hrs  T(C): 36.5 (2019 05:33), Max: 37.8 (2019 21:29)  T(F): 97.7 (2019 05:33), Max: 100 (2019 21:29)  HR: 85 (2019 05:33) (81 - 97)  BP: 161/82 (2019 05:33) (120/72 - 164/79)  BP(mean): --  ABP: --  ABP(mean): --  RR: 22 (2019 05:33) (20 - 30)  SpO2: 94% (2019 05:33) (94% - 97%)      PHYSICAL EXAM:  Constitutional: WDWN  Head: NC/AT  Eyes: PERRL, anicteric sclera  ENMT: no rhinorrhea; clear oropharynx; MMM  Neck: supple  Respiratory: audible wheezing b/l  Cardiovascular: +S1/S2, RRR, right chest wall PPM site w/o pain or erythema/swelling  Gastrointestinal: soft, NT/ND; intact BS  Extremities: WWP; no clubbing, cyanosis, or edema  Vascular: 2+ radial, DP/PT pulses B/L  Dermatologic: skin warm and dry; no visible rashes or lesions  Neurologic: AAOx3, 3/5 strength in lower extremities with normal sensation, 5/5 in upper extremities     LABS:                        11.8   16.02 )-----------( 143      ( 2019 06:29 )             35.9       04-17    144  |  112<H>  |  32<H>  ----------------------------<  144<H>  3.6   |  21<L>  |  0.69    Ca    8.9      2019 06:29  Phos  1.6     04-17  Mg     2.4     04-17    TPro  5.8<L>  /  Alb  2.2<L>  /  TBili  1.1  /  DBili  x   /  AST  71<H>  /  ALT  53<H>  /  AlkPhos  91      Urinalysis Basic - ( 2019 10:48 )    Color: Yellow / Appearance: Clear / S.015 / pH: x  Gluc: x / Ketone: NEGATIVE  / Bili: Negative / Urobili: 0.2 E.U./dL   Blood: x / Protein: 30 mg/dL / Nitrite: NEGATIVE   Leuk Esterase: Trace / RBC: > 10 /HPF / WBC < 5 /HPF   Sq Epi: x / Non Sq Epi: 0-5 /HPF / Bacteria: Present /HPF          MICROBIOLOGY:    Culture - Blood (collected 19 @ 17:54)  Source: .Blood Blood-Venous  Preliminary Report (19 @ 18:01):    No growth at 1 day.    Culture - Blood (collected 19 @ 17:54)  Source: .Blood Blood  Preliminary Report (19 @ 18:01):    No growth at 1 day.    Culture - Blood (collected 04-15-19 @ 19:54)  Source: .Blood Blood-Venous  Gram Stain (19 @ 17:06):    Aerobic Bottle: Gram Positive Cocci in Clusters    Result called to and read back by_ Ms. Manuela Vazquez RN(4UR)    2019 17:05:51  Preliminary Report (19 @ 11:06):    Growth in aerobic bottle: Staphylococcus aureus    Susceptibility to follow.        RADIOLOGY & ADDITIONAL STUDIES:

## 2019-04-18 NOTE — PROGRESS NOTE ADULT - ASSESSMENT
89M with PMH of SSS s/p PPM, HTN, Glaucoma in both eyes (multiple surgeries at [facility]), pyogenic granuloma of eyelid s/p surgery, dementia presenting for b/l LE weakness and back pain in setting of altered mental status found to have MSSA bacteremia 2/2 epidural abscess and discitis

## 2019-04-18 NOTE — CONSULT NOTE ADULT - ASSESSMENT
89yo M with a PMHx of SSS s/p PPM, HTN, Glaucoma, pyogenic granuloma of eyelid, dementia who presented to [facility] on 4/13/19 with severe sepsis and TME with Rhabdomyolysis and  b/l LE weakness and back pain due to MSSA bacteremia from T12-L1 epidural abscess and discitis

## 2019-04-18 NOTE — CONSULT NOTE ADULT - ATTENDING COMMENTS
89 year old male with dementia with epidural abscess with MRSA bacteremia with acute hypoxic respiratory failure (91% on RA at rest). Bedside lung ultrasound showed b/l pleural effusion with b/l lower lobe consolidation. Etiology of hypoxia is atelectasis on bilateral side and fluid overload.    Plan:  - Continue oxygen 4l/min daily  - OOB to chair, ambulation  - Incentive spirometry  - Strict intake and output  - Rest of above

## 2019-04-18 NOTE — PROGRESS NOTE ADULT - PROBLEM SELECTOR PLAN 2
Most likely etiology secondary to ongoing infection- considering patient came in with severe sepsis. With resuscitation patient able to better respond to questions.  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

## 2019-04-18 NOTE — DIETITIAN INITIAL EVALUATION ADULT. - PROBLEM SELECTOR PLAN 1
P/w Severe sepsis, T 102F, Leukocytosis 26, reported tachypnea. HR 90s. Lactate elevation to 4.9-> 3.2 -> 1.8. MRI findings and CT finding concerning for discitis. CRP 20. ESR 30. Given abdominal pain, will obtain CT A/P.  MRI demonstrated abnormal signal at T12-L1 disc with fluid expanding the disc space (i.e. lining of the disc space, especially anteriorly). No associated significant endplate erosion or marrow edema. Findings are again concerning for discitis at T12-L1 (without significant osteomyelitis at this time).   -F/u BCx  -C/w Cefepime (Pending ID approval) and Vancomycin, ID consult in AM  -IR biopsy   -F/u Ortho spine, Obtain Neuro consult in AM  -ID consult in AM  -S/p fluid resuscitation- 30cc/kg 2L bolus + maintenance for >2.3L

## 2019-04-18 NOTE — DIETITIAN INITIAL EVALUATION ADULT. - PROBLEM SELECTOR PLAN 4
/ALT 47, initial TBili 1.6 with Alk Phos 182, likely in setting of Rhabdo cause of transaminitis.  -c/w monitoring LFTs  -hep panel

## 2019-04-19 LAB
ALBUMIN SERPL ELPH-MCNC: 2.1 G/DL — LOW (ref 3.3–5)
ALP SERPL-CCNC: 85 U/L — SIGNIFICANT CHANGE UP (ref 40–120)
ALT FLD-CCNC: 23 U/L — SIGNIFICANT CHANGE UP (ref 10–45)
ANION GAP SERPL CALC-SCNC: 10 MMOL/L — SIGNIFICANT CHANGE UP (ref 5–17)
AST SERPL-CCNC: 28 U/L — SIGNIFICANT CHANGE UP (ref 10–40)
BASOPHILS # BLD AUTO: 0.03 K/UL — SIGNIFICANT CHANGE UP (ref 0–0.2)
BASOPHILS NFR BLD AUTO: 0.1 % — SIGNIFICANT CHANGE UP (ref 0–2)
BILIRUB SERPL-MCNC: 0.8 MG/DL — SIGNIFICANT CHANGE UP (ref 0.2–1.2)
BUN SERPL-MCNC: 23 MG/DL — SIGNIFICANT CHANGE UP (ref 7–23)
CALCIUM SERPL-MCNC: 8.1 MG/DL — LOW (ref 8.4–10.5)
CHLORIDE SERPL-SCNC: 107 MMOL/L — SIGNIFICANT CHANGE UP (ref 96–108)
CO2 SERPL-SCNC: 23 MMOL/L — SIGNIFICANT CHANGE UP (ref 22–31)
CREAT SERPL-MCNC: 0.54 MG/DL — SIGNIFICANT CHANGE UP (ref 0.5–1.3)
EOSINOPHIL # BLD AUTO: 0.21 K/UL — SIGNIFICANT CHANGE UP (ref 0–0.5)
EOSINOPHIL NFR BLD AUTO: 0.9 % — SIGNIFICANT CHANGE UP (ref 0–6)
GLUCOSE BLDC GLUCOMTR-MCNC: 107 MG/DL — HIGH (ref 70–99)
GLUCOSE BLDC GLUCOMTR-MCNC: 120 MG/DL — HIGH (ref 70–99)
GLUCOSE BLDC GLUCOMTR-MCNC: 122 MG/DL — HIGH (ref 70–99)
GLUCOSE BLDC GLUCOMTR-MCNC: 94 MG/DL — SIGNIFICANT CHANGE UP (ref 70–99)
GLUCOSE SERPL-MCNC: 122 MG/DL — HIGH (ref 70–99)
HCT VFR BLD CALC: 35.3 % — LOW (ref 39–50)
HGB BLD-MCNC: 11.5 G/DL — LOW (ref 13–17)
IMM GRANULOCYTES NFR BLD AUTO: 2.9 % — HIGH (ref 0–1.5)
LYMPHOCYTES # BLD AUTO: 1.09 K/UL — SIGNIFICANT CHANGE UP (ref 1–3.3)
LYMPHOCYTES # BLD AUTO: 4.8 % — LOW (ref 13–44)
MAGNESIUM SERPL-MCNC: 2 MG/DL — SIGNIFICANT CHANGE UP (ref 1.6–2.6)
MCHC RBC-ENTMCNC: 29.3 PG — SIGNIFICANT CHANGE UP (ref 27–34)
MCHC RBC-ENTMCNC: 32.6 GM/DL — SIGNIFICANT CHANGE UP (ref 32–36)
MCV RBC AUTO: 90.1 FL — SIGNIFICANT CHANGE UP (ref 80–100)
MONOCYTES # BLD AUTO: 1.08 K/UL — HIGH (ref 0–0.9)
MONOCYTES NFR BLD AUTO: 4.8 % — SIGNIFICANT CHANGE UP (ref 2–14)
NEUTROPHILS # BLD AUTO: 19.64 K/UL — HIGH (ref 1.8–7.4)
NEUTROPHILS NFR BLD AUTO: 86.5 % — HIGH (ref 43–77)
NRBC # BLD: 0 /100 WBCS — SIGNIFICANT CHANGE UP (ref 0–0)
PHOSPHATE SERPL-MCNC: 2.6 MG/DL — SIGNIFICANT CHANGE UP (ref 2.5–4.5)
PLATELET # BLD AUTO: 204 K/UL — SIGNIFICANT CHANGE UP (ref 150–400)
POTASSIUM SERPL-MCNC: 3.5 MMOL/L — SIGNIFICANT CHANGE UP (ref 3.5–5.3)
POTASSIUM SERPL-SCNC: 3.5 MMOL/L — SIGNIFICANT CHANGE UP (ref 3.5–5.3)
PROT SERPL-MCNC: 5.5 G/DL — LOW (ref 6–8.3)
RBC # BLD: 3.92 M/UL — LOW (ref 4.2–5.8)
RBC # FLD: 14.6 % — HIGH (ref 10.3–14.5)
SODIUM SERPL-SCNC: 140 MMOL/L — SIGNIFICANT CHANGE UP (ref 135–145)
WBC # BLD: 22.72 K/UL — HIGH (ref 3.8–10.5)
WBC # FLD AUTO: 22.72 K/UL — HIGH (ref 3.8–10.5)

## 2019-04-19 PROCEDURE — 74018 RADEX ABDOMEN 1 VIEW: CPT | Mod: 26

## 2019-04-19 PROCEDURE — 99233 SBSQ HOSP IP/OBS HIGH 50: CPT | Mod: GC

## 2019-04-19 PROCEDURE — 99232 SBSQ HOSP IP/OBS MODERATE 35: CPT | Mod: GC

## 2019-04-19 RX ORDER — POTASSIUM CHLORIDE 20 MEQ
40 PACKET (EA) ORAL ONCE
Qty: 0 | Refills: 0 | Status: COMPLETED | OUTPATIENT
Start: 2019-04-19 | End: 2019-04-19

## 2019-04-19 RX ORDER — SIMETHICONE 80 MG/1
80 TABLET, CHEWABLE ORAL DAILY
Qty: 0 | Refills: 0 | Status: DISCONTINUED | OUTPATIENT
Start: 2019-04-19 | End: 2019-04-25

## 2019-04-19 RX ADMIN — ATORVASTATIN CALCIUM 20 MILLIGRAM(S): 80 TABLET, FILM COATED ORAL at 22:06

## 2019-04-19 RX ADMIN — Medication 3 MILLILITER(S): at 09:11

## 2019-04-19 RX ADMIN — HEPARIN SODIUM 5000 UNIT(S): 5000 INJECTION INTRAVENOUS; SUBCUTANEOUS at 22:07

## 2019-04-19 RX ADMIN — HEPARIN SODIUM 5000 UNIT(S): 5000 INJECTION INTRAVENOUS; SUBCUTANEOUS at 13:46

## 2019-04-19 RX ADMIN — Medication 40 MILLIEQUIVALENT(S): at 11:19

## 2019-04-19 RX ADMIN — Medication 3 MILLILITER(S): at 13:46

## 2019-04-19 RX ADMIN — Medication 2000 MILLIGRAM(S): at 17:11

## 2019-04-19 RX ADMIN — Medication 81 MILLIGRAM(S): at 11:20

## 2019-04-19 RX ADMIN — Medication 1 DROP(S): at 06:18

## 2019-04-19 RX ADMIN — Medication 650 MILLIGRAM(S): at 11:50

## 2019-04-19 RX ADMIN — Medication 650 MILLIGRAM(S): at 11:20

## 2019-04-19 RX ADMIN — Medication 2000 MILLIGRAM(S): at 02:58

## 2019-04-19 RX ADMIN — Medication 2000 MILLIGRAM(S): at 10:05

## 2019-04-19 RX ADMIN — Medication 1 DROP(S): at 17:11

## 2019-04-19 RX ADMIN — HEPARIN SODIUM 5000 UNIT(S): 5000 INJECTION INTRAVENOUS; SUBCUTANEOUS at 06:18

## 2019-04-19 RX ADMIN — Medication 3 MILLILITER(S): at 02:58

## 2019-04-19 RX ADMIN — Medication 3 MILLILITER(S): at 22:06

## 2019-04-19 RX ADMIN — Medication 3 MILLILITER(S): at 06:18

## 2019-04-19 RX ADMIN — Medication 3 MILLILITER(S): at 17:10

## 2019-04-19 NOTE — PROGRESS NOTE ADULT - SUBJECTIVE AND OBJECTIVE BOX
SUBJECTIVE / INTERVAL HPI: Patient given Duoneb for tachypnea o/n. Patient seen and examined at bedside. Patient denies significant back pain without movement. States he has been using incentive spirometer since being taught yesterday. Unable to report if he has had any BMs. he denies any abdominal pain. Otherwise: (-) fever, (-) chills, (-) dizziness, (-) headache, (-) neck pain, (-) chest pain, (-) palpitations, (-)SOB, (-) nausea, (-) vomiting,  (-)new weakness, (-) paresthesias.      VITAL SIGNS:  Vital Signs Last 24 Hrs  T(C): 37.1 (19 Apr 2019 08:36), Max: 37.3 (18 Apr 2019 22:18)  T(F): 98.7 (19 Apr 2019 08:36), Max: 99.2 (18 Apr 2019 22:18)  HR: 82 (19 Apr 2019 08:36) (70 - 92)  BP: 133/82 (19 Apr 2019 08:36) (133/82 - 156/89)  BP(mean): --  RR: 24 (19 Apr 2019 08:36) (19 - 30)  SpO2: 95% (19 Apr 2019 08:36) (94% - 100%)    PHYSICAL EXAM:    General: ill appearing, resting in bed and in no acute distress; improving tachypnea  HEENT: normocephalic, atraumatic, PERRL, anicteric sclera; no conjunctival pallor, mucus membranes moist  Neck: supple, no masses, No JVD  Cardiovascular: +S1/S2, regular rate and rhythm; no murmurs, no rub, no gallop  Respiratory:, no rhonchi, no wheeze, no rales. slight crackles in R mid lung field.   Gastrointestinal: soft, active bowel sounds, (+) b/l upper quadrant tenderness w/ deep palpation,  (+) distended  Extremities: Warm, dry; no edema, clubbing or cyanosis  Vascular: 2+ radial, DP pulses bilaterally  Neurological: AAOx3-oriented to self, location, and date this AM; no focal deficits. BLE strength: 4/5. Sensation intact throughout.       MEDICATIONS:  MEDICATIONS  (STANDING):  ALBUTerol/ipratropium for Nebulization 3 milliLiter(s) Nebulizer every 4 hours  aspirin  chewable 81 milliGRAM(s) Oral daily  atorvastatin 20 milliGRAM(s) Oral at bedtime  ceFAZolin  Injectable. 2000 milliGRAM(s) IV Push every 8 hours  dextrose 5%. 1000 milliLiter(s) (50 mL/Hr) IV Continuous <Continuous>  dextrose 50% Injectable 12.5 Gram(s) IV Push once  dextrose 50% Injectable 25 Gram(s) IV Push once  dextrose 50% Injectable 25 Gram(s) IV Push once  heparin  Injectable 5000 Unit(s) SubCutaneous every 8 hours  insulin lispro (HumaLOG) corrective regimen sliding scale   SubCutaneous Before meals and at bedtime  potassium chloride    Tablet ER 40 milliEquivalent(s) Oral once  rifampin 600 milliGRAM(s) Oral daily  timolol 0.5% Solution 1 Drop(s) Both EYES every 12 hours    MEDICATIONS  (PRN):  acetaminophen   Tablet .. 650 milliGRAM(s) Oral every 6 hours PRN Temp greater or equal to 38C (100.4F), Mild Pain (1 - 3)  dextrose 40% Gel 15 Gram(s) Oral once PRN Blood Glucose LESS THAN 70 milliGRAM(s)/deciliter  glucagon  Injectable 1 milliGRAM(s) IntraMuscular once PRN Glucose LESS THAN 70 milligrams/deciliter      ALLERGIES:  Allergies    No Known Allergies    Intolerances        LABS:                        11.5   22.72 )-----------( 204      ( 19 Apr 2019 06:33 )             35.3     04-19    140  |  107  |  23  ----------------------------<  122<H>  3.5   |  23  |  0.54    Ca    8.1<L>      19 Apr 2019 06:33  Phos  2.6     04-19  Mg     2.0     04-19    TPro  5.5<L>  /  Alb  2.1<L>  /  TBili  0.8  /  DBili  x   /  AST  28  /  ALT  23  /  AlkPhos  85  04-19        CAPILLARY BLOOD GLUCOSE      POCT Blood Glucose.: 94 mg/dL (19 Apr 2019 08:29)      RADIOLOGY & ADDITIONAL TESTS: Reviewed.

## 2019-04-19 NOTE — PROGRESS NOTE ADULT - ASSESSMENT
89yo M with a PMHx of SSS s/p PPM, HTN, Glaucoma, pyogenic granuloma of eyelid, dementia who presented to [facility] on 4/13/19 with severe sepsis and TME with Rhabdomyolysis and  b/l LE weakness and back pain due to MSSA bacteremia from T12-L1 epidural abscess and discitis

## 2019-04-19 NOTE — PROGRESS NOTE ADULT - PROBLEM SELECTOR PLAN 10
VTE: HSQ  DNR/DNI (MOLST in chart)    DISPO: RMF VTE: HSQ  DNR/DNI (MOLST in chart)    DISPO: RMF  Pt rec SYLVIE

## 2019-04-19 NOTE — PROGRESS NOTE ADULT - ASSESSMENT
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

## 2019-04-19 NOTE — PROGRESS NOTE ADULT - ASSESSMENT
89M with PMH of SSS s/p PPM, L4-S1 post spinal fusion, L2-S1 laminectomy, HTN, Glaucoma, pyogenic granuloma of eyelid s/p surgery, dementia presenting for b/l LE weakness and back pain, found to have T12-L1 discitis and MSSA BSI.      - c/w cefazolin 2g IV q8h  - c/w rifampin 600mg PO q24h for biofilm penetration given hardware  - continue above regimen for at least 6 weeks through 5/26/19(no final recs until decide on SUSANA)  - gallium scan showing abnormalities in the spine but nowhere else  - will discuss SUSANA with the patient again in the setting of uptrending WBC    ID team 1 to follow 89M with PMH of SSS s/p PPM, L4-S1 post spinal fusion, L2-S1 laminectomy, HTN, Glaucoma, pyogenic granuloma of eyelid s/p surgery, dementia presenting for b/l LE weakness and back pain, found to have T12-L1 discitis and MSSA BSI.      - gallium scan showing abnormalities in the spine but nowhere else  - patient does not want SUSANA or any invasive procedures  - c/w cefazolin 2g IV q8h(End 5/28/19)  - c/w rifampin 600mg PO q24h for biofilm penetration given hardware(5/28/19)    ID team 1 to will signoff, please call with any questions 89M with PMH of SSS s/p PPM, L4-S1 post spinal fusion, L2-S1 laminectomy, HTN, Glaucoma, pyogenic granuloma of eyelid s/p surgery, dementia presenting for b/l LE weakness and back pain, found to have T12-L1 discitis and MSSA BSI.      - gallium scan showing abnormalities in the spine but nowhere else  - patient does not want SUSANA or any invasive procedures  - c/w cefazolin 2g IV q8h (possible end 5/28/19) - he likely will need subsequent suppression  - c/w rifampin 600mg PO q24h for biofilm penetration given hardware(possible 5/28/19)    ID team 1 to will signoff, please call with any questions 89M with PMH of SSS s/p PPM, L4-S1 post spinal fusion, L2-S1 laminectomy, HTN, Glaucoma, pyogenic granuloma of eyelid s/p surgery, dementia presenting for b/l LE weakness and back pain, found to have T12-L1 discitis and MSSA BSI.      - gallium scan showing abnormalities in the spine but nowhere else  - patient does not want SUSANA or any invasive procedures  - c/w cefazolin 2g IV q8h (possible end 5/28/19) - he likely will need subsequent suppression  - c/w rifampin 600mg PO q24h for biofilm penetration given hardware(possible 5/28/19)  - please contact ID team when close to discharge to arrange follow up with Dr. Ann    ID team 1 to will signoff, please call with any questions

## 2019-04-19 NOTE — PROGRESS NOTE ADULT - PROBLEM SELECTOR PLAN 1
Met severe sepsis criteria on admission T 102F, Leukocytosis 26, reported tachypnea. HR 90s. Lactate elevation to 4.9-> 3.2 -> 1.8. Found to have MSSA bacteremia secondary to discitis and T12/L1 epidural abscess. Continued bacteremia on 4/16 after T102F.   -ID recs appreciated  -Continue rifampin and Cefazolin  -Negative TTE for endocarditis  -Pending gallium  -Ortho spine recs with no surgical intervention at this time      #Abdominal Distension   Abdominal Xray with large stool burden, no evidence of perforation or obstruction. Leukocytosis improving. had 2 BMs since yesterday.  - Bowel Regimen - Miralax, Senna, Colace    #Tachypnea and Wheezing  Persistent tachypnea but does not feel SOB. TTE EF 60%, no vegetation. CXR  Mildly worsened bibasilar hazy opacities and worsening pleural effusion on R. s/p 1 dose lasix 20mg IVP.  - Duonebs PRN  - pulm recs appreciated Met severe sepsis criteria on admission T 102F, Leukocytosis 26, reported tachypnea. HR 90s. Lactate elevation to 4.9-> 3.2 -> 1.8. Found to have MSSA bacteremia secondary to discitis and T12/L1 epidural abscess. Continued bacteremia on 4/16 after T102F.   -ID recs appreciated  -Continue rifampin and Cefazolin  -Negative TTE for endocarditis  -Gallium with uptake only in spine  -Ortho spine recs with no surgical intervention at this time      #Abdominal Distension   persistent abdominal distention. was previously on bowel regimen and had several loose BMs. bowel regimen d/c-ed..  -Abd x-ray in light of continued distention and uptrending leukocytosis.  -Will consider c diff study if continued distention    #Tachypnea and Wheezing  Persistent tachypnea but does not feel SOB. TTE EF 60%, no vegetation. CXR  Mildly worsened bibasilar hazy opacities and worsening pleural effusion on R. s/p 1 dose lasix 20mg IVP.  - Duonebs PRN  - pulm recs appreciated  -OOB and incentive spirometer

## 2019-04-19 NOTE — PROGRESS NOTE ADULT - SUBJECTIVE AND OBJECTIVE BOX
INTERVAL HPI/OVERNIGHT EVENTS: Patient seen and examined at bedside. No acute events overnight. Patient states he feels roughly the same today. Does not endorse subjective shortness of breath, today endorses cough. Denies fevers, chills, chest pain, palpitations, abdominal pain, or lower extremity edema. He cannot recall if he ambulated or was OOB yesterday.     VITAL SIGNS:  T(F): 98.7 (19 @ 08:36)  HR: 82 (19 @ 08:36)  BP: 133/82 (19 @ 08:36)  RR: 24 (19 @ 08:36)  SpO2: 95% (19 @ 08:36)  Wt(kg): --    PHYSICAL EXAM:    Constitutional: NAD, A&Ox2  Eyes: PERRLA, EOMI; R eyelid lag noted  ENMT: MMM, purplish lesion on R lower lip  Neck: No JVD, No LAD  Respiratory: Decreased air entry with bronchial breath sounds in b/l mid lung fields, no egophony, decreased BS in b/l bases  Cardiovascular: RRR, no murmurs, rubs, gallops  Gastrointestinal: Mildly distended and tympanic, NT  Extremities: Warm, no clubbing, cyanosis, 1+ non-pitting edema b/l  Vascular: 2+ distal pulses, equal  Neurological: CN II-XII grossly intact  Musculoskeletal: 5/5 strength x 4 extremities    MEDICATIONS  (STANDING):  ALBUTerol/ipratropium for Nebulization 3 milliLiter(s) Nebulizer every 4 hours  aspirin  chewable 81 milliGRAM(s) Oral daily  atorvastatin 20 milliGRAM(s) Oral at bedtime  ceFAZolin  Injectable. 2000 milliGRAM(s) IV Push every 8 hours  dextrose 5%. 1000 milliLiter(s) (50 mL/Hr) IV Continuous <Continuous>  dextrose 50% Injectable 12.5 Gram(s) IV Push once  dextrose 50% Injectable 25 Gram(s) IV Push once  dextrose 50% Injectable 25 Gram(s) IV Push once  heparin  Injectable 5000 Unit(s) SubCutaneous every 8 hours  insulin lispro (HumaLOG) corrective regimen sliding scale   SubCutaneous Before meals and at bedtime  rifampin 600 milliGRAM(s) Oral daily  timolol 0.5% Solution 1 Drop(s) Both EYES every 12 hours    MEDICATIONS  (PRN):  acetaminophen   Tablet .. 650 milliGRAM(s) Oral every 6 hours PRN Temp greater or equal to 38C (100.4F), Mild Pain (1 - 3)  dextrose 40% Gel 15 Gram(s) Oral once PRN Blood Glucose LESS THAN 70 milliGRAM(s)/deciliter  glucagon  Injectable 1 milliGRAM(s) IntraMuscular once PRN Glucose LESS THAN 70 milligrams/deciliter      Allergies    No Known Allergies    Intolerances        LABS:                        11.5   22.72 )-----------( 204      ( 2019 06:33 )             35.3     04-19    140  |  107  |  23  ----------------------------<  122<H>  3.5   |  23  |  0.54    Ca    8.1<L>      2019 06:33  Phos  2.6     -  Mg     2.0     -    TPro  5.5<L>  /  Alb  2.1<L>  /  TBili  0.8  /  DBili  x   /  AST  28  /  ALT  23  /  AlkPhos  85  04-19      Urinalysis Basic - ( 2019 10:48 )    Color: Yellow / Appearance: Clear / S.015 / pH: x  Gluc: x / Ketone: NEGATIVE  / Bili: Negative / Urobili: 0.2 E.U./dL   Blood: x / Protein: 30 mg/dL / Nitrite: NEGATIVE   Leuk Esterase: Trace / RBC: > 10 /HPF / WBC < 5 /HPF   Sq Epi: x / Non Sq Epi: 0-5 /HPF / Bacteria: Present /HPF        RADIOLOGY & ADDITIONAL TESTS: Reviewed

## 2019-04-19 NOTE — PROGRESS NOTE ADULT - SUBJECTIVE AND OBJECTIVE BOX
INFECTIOUS DISEASE CONSULT PROGRESS NOTE    INTERVAL HPI/OVERNIGHT EVENTS:    ACTIVE ANTIMICROBIALS/ANTIBIOTICS:  ceFAZolin  Injectable. 2000 milliGRAM(s) IV Push every 8 hours  rifampin 600 milliGRAM(s) Oral daily      VITAL SIGNS:  ICU Vital Signs Last 24 Hrs  T(C): 36.9 (2019 04:59), Max: 37.3 (2019 22:18)  T(F): 98.5 (2019 04:59), Max: 99.2 (2019 22:18)  HR: 91 (2019 04:59) (70 - 92)  BP: 156/89 (2019 04:59) (135/78 - 161/91)  BP(mean): --  ABP: --  ABP(mean): --  RR: 19 (2019 04:59) (19 - 30)  SpO2: 94% (2019 04:59) (93% - 100%)      PHYSICAL EXAM:  Constitutional: elderly male comfortable in bed  Head: NC/AT  Eyes: PERRL, anicteric sclera  ENMT: no rhinorrhea; clear oropharynx; MMM  Neck: supple  Respiratory: audible wheezing b/l  Cardiovascular: +S1/S2, RRR, right chest wall PPM site w/o pain or erythema/swelling  Gastrointestinal: soft, NT/ND; intact BS  Extremities: WWP; no clubbing, cyanosis, or edema  Vascular: 2+ radial, DP/PT pulses B/L  Dermatologic: skin warm and dry; no visible rashes or lesions  Neurologic: AAOx3, 4/5 strength in lower extremities with normal sensation, 5/5 in upper extremities     LABS:                        11.5   22.72 )-----------( 204      ( 2019 06:33 )             35.3       04-18    143  |  110<H>  |  18  ----------------------------<  130<H>  3.4<L>   |  24  |  0.54    Ca    8.4      2019 06:46  Phos  2.0     04-18  Mg     2.2     04-18    TPro  5.4<L>  /  Alb  2.4<L>  /  TBili  1.1  /  DBili  x   /  AST  42<H>  /  ALT  35  /  AlkPhos  79  04-18    Urinalysis Basic - ( 2019 10:48 )    Color: Yellow / Appearance: Clear / S.015 / pH: x  Gluc: x / Ketone: NEGATIVE  / Bili: Negative / Urobili: 0.2 E.U./dL   Blood: x / Protein: 30 mg/dL / Nitrite: NEGATIVE   Leuk Esterase: Trace / RBC: > 10 /HPF / WBC < 5 /HPF   Sq Epi: x / Non Sq Epi: 0-5 /HPF / Bacteria: Present /HPF          MICROBIOLOGY:    Culture - Urine (collected 19 @ 08:41)  Source: .Urine Clean Catch (Midstream)  Final Report (19 @ 09:11):    No growth    Culture - Blood (collected 19 @ 17:54)  Source: .Blood Blood-Venous  Preliminary Report (19 @ 18:00):    No growth at 2 days.    Culture - Blood (collected 19 @ 17:54)  Source: .Blood Blood  Preliminary Report (19 @ 18:00):    No growth at 2 days.        RADIOLOGY & ADDITIONAL STUDIES: INFECTIOUS DISEASE CONSULT PROGRESS NOTE    INTERVAL HPI/OVERNIGHT EVENTS: No acute events overnight. Persistent back pain today stable from prior    ACTIVE ANTIMICROBIALS/ANTIBIOTICS:  ceFAZolin  Injectable. 2000 milliGRAM(s) IV Push every 8 hours  rifampin 600 milliGRAM(s) Oral daily      VITAL SIGNS:  ICU Vital Signs Last 24 Hrs  T(C): 36.9 (2019 04:59), Max: 37.3 (2019 22:18)  T(F): 98.5 (2019 04:59), Max: 99.2 (2019 22:18)  HR: 91 (2019 04:59) (70 - 92)  BP: 156/89 (2019 04:59) (135/78 - 161/91)  BP(mean): --  ABP: --  ABP(mean): --  RR: 19 (2019 04:59) (19 - 30)  SpO2: 94% (2019 04:59) (93% - 100%)      PHYSICAL EXAM:  Constitutional: elderly male comfortable in bed  Head: NC/AT  Eyes: PERRL, anicteric sclera  ENMT: no rhinorrhea; clear oropharynx; MMM  Neck: supple  Respiratory: audible wheezing b/l  Cardiovascular: +S1/S2, RRR, right chest wall PPM site w/o pain or erythema/swelling  Gastrointestinal: soft, NT/ND; intact BS  Extremities: WWP; no clubbing, cyanosis, or edema  Vascular: 2+ radial, DP/PT pulses B/L  Dermatologic: skin warm and dry; no visible rashes or lesions  Neurologic: AAOx3, 4/5 strength in lower extremities with normal sensation, 5/5 in upper extremities     LABS:                        11.5   22.72 )-----------( 204      ( 2019 06:33 )             35.3       04-18    143  |  110<H>  |  18  ----------------------------<  130<H>  3.4<L>   |  24  |  0.54    Ca    8.4      2019 06:46  Phos  2.0     04-18  Mg     2.2     04-18    TPro  5.4<L>  /  Alb  2.4<L>  /  TBili  1.1  /  DBili  x   /  AST  42<H>  /  ALT  35  /  AlkPhos  79  04-18    Urinalysis Basic - ( 2019 10:48 )    Color: Yellow / Appearance: Clear / S.015 / pH: x  Gluc: x / Ketone: NEGATIVE  / Bili: Negative / Urobili: 0.2 E.U./dL   Blood: x / Protein: 30 mg/dL / Nitrite: NEGATIVE   Leuk Esterase: Trace / RBC: > 10 /HPF / WBC < 5 /HPF   Sq Epi: x / Non Sq Epi: 0-5 /HPF / Bacteria: Present /HPF          MICROBIOLOGY:    Culture - Urine (collected 19 @ 08:41)  Source: .Urine Clean Catch (Midstream)  Final Report (19 @ 09:11):    No growth    Culture - Blood (collected 19 @ 17:54)  Source: .Blood Blood-Venous  Preliminary Report (19 @ 18:00):    No growth at 2 days.    Culture - Blood (collected 19 @ 17:54)  Source: .Blood Blood  Preliminary Report (19 @ 18:00):    No growth at 2 days.        RADIOLOGY & ADDITIONAL STUDIES:

## 2019-04-19 NOTE — PROGRESS NOTE ADULT - PROBLEM SELECTOR PLAN 2
Patient with b/l pleural effusions seen on Abdominal CT. POCUS done again today shows small bilateral pleural effusions with L>R that are simple and free flowing. There was also significant consolidation bilaterally which most likely represents atelectasis.   - The etiology of the fluid is likely volume overload as it appears to be improving with Lasix - patient net negative >2,000mL since admission.     Recommend  - No role for thoracentesis at this time  - Would recommend PT and ambulation, OOB to chair as noted above.   - Hold further diuresis at this time Patient with b/l pleural effusions seen on Abdominal CT. POCUS done again today shows small bilateral pleural effusions with L>R that are simple and free flowing. There was also significant consolidation bilaterally which most likely represents atelectasis.   - The etiology of the fluid is likely volume overload as it appears to be improving with Lasix - patient net negative >2,000mL  in the last 24hrs and overall 8L neg since admission.     Recommend  - No role for thoracentesis at this time  - Would recommend PT and ambulation, OOB to chair as noted above.   - Hold further diuresis at this time

## 2019-04-19 NOTE — PROGRESS NOTE ADULT - PROBLEM SELECTOR PLAN 2
"Chief Complaint   Patient presents with     RECHECK     Folow up right knee MRI results.       Initial /64 (BP Location: Right arm, Patient Position: Sitting, Cuff Size: Adult Regular)  Pulse 70  Temp 99  F (37.2  C) (Tympanic)  Ht 5' 4\" (1.626 m)  Wt 227 lb (103 kg)  SpO2 97%  BMI 38.96 kg/m2 Estimated body mass index is 38.96 kg/(m^2) as calculated from the following:    Height as of this encounter: 5' 4\" (1.626 m).    Weight as of this encounter: 227 lb (103 kg).  Medication Reconciliation: complete   Stacy Lofton LPN      " Most likely etiology secondary to ongoing infection- considering patient came in with severe sepsis. With resuscitation patient able to better respond to questions.  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

## 2019-04-20 DIAGNOSIS — A49.01 METHICILLIN SUSCEPTIBLE STAPHYLOCOCCUS AUREUS INFECTION, UNSPECIFIED SITE: ICD-10-CM

## 2019-04-20 DIAGNOSIS — M46.40 DISCITIS, UNSPECIFIED, SITE UNSPECIFIED: ICD-10-CM

## 2019-04-20 DIAGNOSIS — R19.7 DIARRHEA, UNSPECIFIED: ICD-10-CM

## 2019-04-20 DIAGNOSIS — M46.20 OSTEOMYELITIS OF VERTEBRA, SITE UNSPECIFIED: ICD-10-CM

## 2019-04-20 LAB
ALBUMIN SERPL ELPH-MCNC: 2.1 G/DL — LOW (ref 3.3–5)
ALP SERPL-CCNC: 81 U/L — SIGNIFICANT CHANGE UP (ref 40–120)
ALT FLD-CCNC: 18 U/L — SIGNIFICANT CHANGE UP (ref 10–45)
ANION GAP SERPL CALC-SCNC: 10 MMOL/L — SIGNIFICANT CHANGE UP (ref 5–17)
AST SERPL-CCNC: 28 U/L — SIGNIFICANT CHANGE UP (ref 10–40)
BASOPHILS # BLD AUTO: 0.03 K/UL — SIGNIFICANT CHANGE UP (ref 0–0.2)
BASOPHILS NFR BLD AUTO: 0.1 % — SIGNIFICANT CHANGE UP (ref 0–2)
BILIRUB SERPL-MCNC: 0.6 MG/DL — SIGNIFICANT CHANGE UP (ref 0.2–1.2)
BUN SERPL-MCNC: 21 MG/DL — SIGNIFICANT CHANGE UP (ref 7–23)
CALCIUM SERPL-MCNC: 8.2 MG/DL — LOW (ref 8.4–10.5)
CHLORIDE SERPL-SCNC: 107 MMOL/L — SIGNIFICANT CHANGE UP (ref 96–108)
CO2 SERPL-SCNC: 24 MMOL/L — SIGNIFICANT CHANGE UP (ref 22–31)
CREAT SERPL-MCNC: 0.52 MG/DL — SIGNIFICANT CHANGE UP (ref 0.5–1.3)
EOSINOPHIL # BLD AUTO: 0.27 K/UL — SIGNIFICANT CHANGE UP (ref 0–0.5)
EOSINOPHIL NFR BLD AUTO: 1.2 % — SIGNIFICANT CHANGE UP (ref 0–6)
GLUCOSE BLDC GLUCOMTR-MCNC: 100 MG/DL — HIGH (ref 70–99)
GLUCOSE BLDC GLUCOMTR-MCNC: 103 MG/DL — HIGH (ref 70–99)
GLUCOSE BLDC GLUCOMTR-MCNC: 119 MG/DL — HIGH (ref 70–99)
GLUCOSE BLDC GLUCOMTR-MCNC: 171 MG/DL — HIGH (ref 70–99)
GLUCOSE SERPL-MCNC: 107 MG/DL — HIGH (ref 70–99)
GRAM STN FLD: SIGNIFICANT CHANGE UP
HCT VFR BLD CALC: 36.8 % — LOW (ref 39–50)
HGB BLD-MCNC: 11.9 G/DL — LOW (ref 13–17)
IMM GRANULOCYTES NFR BLD AUTO: 3.3 % — HIGH (ref 0–1.5)
LYMPHOCYTES # BLD AUTO: 1.01 K/UL — SIGNIFICANT CHANGE UP (ref 1–3.3)
LYMPHOCYTES # BLD AUTO: 4.4 % — LOW (ref 13–44)
MAGNESIUM SERPL-MCNC: 2 MG/DL — SIGNIFICANT CHANGE UP (ref 1.6–2.6)
MCHC RBC-ENTMCNC: 29.5 PG — SIGNIFICANT CHANGE UP (ref 27–34)
MCHC RBC-ENTMCNC: 32.3 GM/DL — SIGNIFICANT CHANGE UP (ref 32–36)
MCV RBC AUTO: 91.1 FL — SIGNIFICANT CHANGE UP (ref 80–100)
MONOCYTES # BLD AUTO: 1.1 K/UL — HIGH (ref 0–0.9)
MONOCYTES NFR BLD AUTO: 4.8 % — SIGNIFICANT CHANGE UP (ref 2–14)
NEUTROPHILS # BLD AUTO: 19.84 K/UL — HIGH (ref 1.8–7.4)
NEUTROPHILS NFR BLD AUTO: 86.2 % — HIGH (ref 43–77)
NRBC # BLD: 0 /100 WBCS — SIGNIFICANT CHANGE UP (ref 0–0)
PHOSPHATE SERPL-MCNC: 2.7 MG/DL — SIGNIFICANT CHANGE UP (ref 2.5–4.5)
PLATELET # BLD AUTO: 258 K/UL — SIGNIFICANT CHANGE UP (ref 150–400)
POTASSIUM SERPL-MCNC: 4.2 MMOL/L — SIGNIFICANT CHANGE UP (ref 3.5–5.3)
POTASSIUM SERPL-SCNC: 4.2 MMOL/L — SIGNIFICANT CHANGE UP (ref 3.5–5.3)
PROT SERPL-MCNC: 5.5 G/DL — LOW (ref 6–8.3)
RBC # BLD: 4.04 M/UL — LOW (ref 4.2–5.8)
RBC # FLD: 14.6 % — HIGH (ref 10.3–14.5)
SODIUM SERPL-SCNC: 141 MMOL/L — SIGNIFICANT CHANGE UP (ref 135–145)
WBC # BLD: 23.01 K/UL — HIGH (ref 3.8–10.5)
WBC # FLD AUTO: 23.01 K/UL — HIGH (ref 3.8–10.5)

## 2019-04-20 PROCEDURE — 71045 X-RAY EXAM CHEST 1 VIEW: CPT | Mod: 26

## 2019-04-20 PROCEDURE — 99232 SBSQ HOSP IP/OBS MODERATE 35: CPT

## 2019-04-20 PROCEDURE — 99233 SBSQ HOSP IP/OBS HIGH 50: CPT | Mod: GC

## 2019-04-20 RX ORDER — IPRATROPIUM/ALBUTEROL SULFATE 18-103MCG
3 AEROSOL WITH ADAPTER (GRAM) INHALATION ONCE
Qty: 0 | Refills: 0 | Status: COMPLETED | OUTPATIENT
Start: 2019-04-20 | End: 2019-04-20

## 2019-04-20 RX ADMIN — Medication 1 DROP(S): at 17:39

## 2019-04-20 RX ADMIN — HEPARIN SODIUM 5000 UNIT(S): 5000 INJECTION INTRAVENOUS; SUBCUTANEOUS at 12:51

## 2019-04-20 RX ADMIN — Medication 3 MILLILITER(S): at 17:34

## 2019-04-20 RX ADMIN — Medication 3 MILLILITER(S): at 14:01

## 2019-04-20 RX ADMIN — Medication 81 MILLIGRAM(S): at 12:50

## 2019-04-20 RX ADMIN — SIMETHICONE 80 MILLIGRAM(S): 80 TABLET, CHEWABLE ORAL at 12:50

## 2019-04-20 RX ADMIN — Medication 650 MILLIGRAM(S): at 17:35

## 2019-04-20 RX ADMIN — Medication 2000 MILLIGRAM(S): at 12:50

## 2019-04-20 RX ADMIN — Medication 3 MILLILITER(S): at 01:52

## 2019-04-20 RX ADMIN — Medication 3 MILLILITER(S): at 10:04

## 2019-04-20 RX ADMIN — Medication 3 MILLILITER(S): at 21:15

## 2019-04-20 RX ADMIN — Medication 650 MILLIGRAM(S): at 19:12

## 2019-04-20 RX ADMIN — Medication 1: at 12:49

## 2019-04-20 RX ADMIN — HEPARIN SODIUM 5000 UNIT(S): 5000 INJECTION INTRAVENOUS; SUBCUTANEOUS at 21:15

## 2019-04-20 RX ADMIN — Medication 3 MILLILITER(S): at 06:08

## 2019-04-20 RX ADMIN — Medication 2000 MILLIGRAM(S): at 21:15

## 2019-04-20 RX ADMIN — ATORVASTATIN CALCIUM 20 MILLIGRAM(S): 80 TABLET, FILM COATED ORAL at 21:15

## 2019-04-20 RX ADMIN — Medication 2000 MILLIGRAM(S): at 05:03

## 2019-04-20 RX ADMIN — HEPARIN SODIUM 5000 UNIT(S): 5000 INJECTION INTRAVENOUS; SUBCUTANEOUS at 06:08

## 2019-04-20 RX ADMIN — Medication 1 DROP(S): at 06:08

## 2019-04-20 NOTE — PROGRESS NOTE ADULT - PROBLEM SELECTOR PLAN 2
Patient with b/l pleural effusions seen on Abdominal CT. POCUS done again today shows small bilateral pleural effusions with L>R that are simple and free flowing. There was also significant consolidation bilaterally which most likely represents atelectasis.   - The etiology of the fluid is likely volume overload as it appears to be improving with Lasix - patient net negative >2,000mL  in the last 24hrs and overall 8L neg since admission.     Recommend  - No role for thoracentesis at this time  - Would recommend PT and ambulation, OOB to chair as noted above.   - Hold further diuresis at this time

## 2019-04-20 NOTE — PROGRESS NOTE ADULT - PROBLEM SELECTOR PLAN 1
Pulmonology consulted for tachypnea with increased oxygen requirements from baseline. Vitals and I/Os reviewed.   -Patient visibly less tachypneic today.  - Etiology likely multifactorial including small b/l Pleural effusions b/l atelectasis leading to formation of dead space and worsening VQ mismatch as well as splinting from pain.  - POCUS this morning showing small simple left effusion, slightly smaller than yesterday, significant atelectasis on the left but present as well on the right. A-Line predominant pattern otherwise.     Recommend:  - Incentive Spirometry use 10x/hour while awake, this should be strongly enforced.   - Pt needs to have PT which he will strongly benefit from, he needs to ambulate as much as possible, and he needs to sit in the chair as much as possible.   - Optimized pain control and bowel regimen.  -WBC count increased today in the setting of slightly improved respiratory status, this more likely linked to the patients abscess. Pulmonology consulted for tachypnea with increased oxygen requirements from baseline. Vitals and I/Os reviewed.     - Etiology likely multifactorial including small b/l Pleural effusions b/l atelectasis leading to formation of dead space and worsening VQ mismatch as well as splinting from pain.  - POCUS this morning showing small simple left effusion, slightly smaller than yesterday, significant atelectasis on the left but present as well on the right. A-Line predominant pattern otherwise.     Recommend:  - Continue Incentive Spirometry use 10x/hour while awake  - PT and Frequent ambulation.   - OOB to chair as often as tolerated.   - WBC count continues to rise. In the setting of slightly improved respiratory status, this more likely linked to the patients abscess.

## 2019-04-20 NOTE — PROGRESS NOTE ADULT - PROBLEM SELECTOR PLAN 8
Hx of HTN on amlodipine and losartan  - Hold in setting of sepsis  - Plan to restart as BP tolerates    #HLD  Hx of HLD on home Lipitor  -continue home lipitor 20mg    #sick sinus syndrome  Hx of SSS s/p PPM, PPM interrogated on presentation and changed into MRI mode to evaluate back.

## 2019-04-20 NOTE — PROGRESS NOTE ADULT - SUBJECTIVE AND OBJECTIVE BOX
SUBJECTIVE / INTERVAL HPI: Patient had 3 loose BMs o/n. Had 2 formed BMs yesterday. Patient seen and examined at bedside. Patient without complaints at this time. States back only hurts when he moves. Otherwise: (-) fever, (-) chills, (-) dizziness, (-) headache, (-) neck pain, (-) chest pain, (-) palpitations, (-)SOB, (-) nausea, (-) vomiting, (-) diarrhea, (-) abdominal pain, (-)new weakness, (-) paresthesias.      VITAL SIGNS:  Vital Signs Last 24 Hrs  T(C): 37.1 (20 Apr 2019 05:38), Max: 37.4 (19 Apr 2019 21:36)  T(F): 98.8 (20 Apr 2019 05:38), Max: 99.3 (19 Apr 2019 21:36)  HR: 71 (20 Apr 2019 05:38) (71 - 88)  BP: 153/91 (20 Apr 2019 05:38) (153/91 - 157/82)  BP(mean): --  RR: 19 (20 Apr 2019 05:38) (19 - 20)  SpO2: 96% (20 Apr 2019 05:38) (96% - 98%)    PHYSICAL EXAM:    General: well appearing, resting comfortably in bed and in no acute distress. However noted increased tachypnea when moving to upright position  HEENT: normocephalic, atraumatic, PERRL, anicteric sclera; no conjunctival pallor, mucus membranes moist  Neck: supple, no masses  Cardiovascular: +S1/S2, regular rate and rhythm; no murmurs, no rub, no gallop  Respiratory: clear to auscultation bilaterally, no rhonchi, (+) slight expiratory wheeze, no rales, (+) trace crackles to R mid lung field  Gastrointestinal: soft, active bowel sounds, (+) mild distention though so  ft, (+) tenderness to deep palpation to the upper quadrants  Extremities: Warm, dry; no edema, clubbing or cyanosis  Vascular: 2+ radial, DP pulses bilaterally  Neurological: AAOx3-oriented to self, location, and date; no focal deficits. BLE strength: 4/5. Sensation intact throughout.     MEDICATIONS:  MEDICATIONS  (STANDING):  ALBUTerol/ipratropium for Nebulization 3 milliLiter(s) Nebulizer every 4 hours  aspirin  chewable 81 milliGRAM(s) Oral daily  atorvastatin 20 milliGRAM(s) Oral at bedtime  ceFAZolin  Injectable. 2000 milliGRAM(s) IV Push every 8 hours  dextrose 5%. 1000 milliLiter(s) (50 mL/Hr) IV Continuous <Continuous>  dextrose 50% Injectable 12.5 Gram(s) IV Push once  dextrose 50% Injectable 25 Gram(s) IV Push once  dextrose 50% Injectable 25 Gram(s) IV Push once  heparin  Injectable 5000 Unit(s) SubCutaneous every 8 hours  insulin lispro (HumaLOG) corrective regimen sliding scale   SubCutaneous Before meals and at bedtime  rifampin 600 milliGRAM(s) Oral daily  simethicone 80 milliGRAM(s) Chew daily  timolol 0.5% Solution 1 Drop(s) Both EYES every 12 hours    MEDICATIONS  (PRN):  acetaminophen   Tablet .. 650 milliGRAM(s) Oral every 6 hours PRN Temp greater or equal to 38C (100.4F), Mild Pain (1 - 3)  dextrose 40% Gel 15 Gram(s) Oral once PRN Blood Glucose LESS THAN 70 milliGRAM(s)/deciliter  glucagon  Injectable 1 milliGRAM(s) IntraMuscular once PRN Glucose LESS THAN 70 milligrams/deciliter      ALLERGIES:  Allergies    No Known Allergies    Intolerances        LABS:                        11.9   23.01 )-----------( 258      ( 20 Apr 2019 07:07 )             36.8     04-20    141  |  107  |  21  ----------------------------<  107<H>  4.2   |  24  |  0.52    Ca    8.2<L>      20 Apr 2019 07:07  Phos  2.7     04-20  Mg     2.0     04-20    TPro  5.5<L>  /  Alb  2.1<L>  /  TBili  0.6  /  DBili  x   /  AST  28  /  ALT  18  /  AlkPhos  81  04-20        CAPILLARY BLOOD GLUCOSE      POCT Blood Glucose.: 100 mg/dL (20 Apr 2019 08:22)      RADIOLOGY & ADDITIONAL TESTS: Reviewed.

## 2019-04-20 NOTE — PROGRESS NOTE ADULT - PROBLEM SELECTOR PLAN 4
resolved: Most likely etiology secondary to ongoing infection- considering patient came in with severe sepsis.   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

## 2019-04-20 NOTE — PROGRESS NOTE ADULT - ASSESSMENT
89yo M with a PMHx of SSS s/p PPM, HTN, Glaucoma, pyogenic granuloma of eyelid, dementia who presented to [facility] on 4/13/19 with severe sepsis and TME with Rhabdomyolysis and  b/l LE weakness and back pain due to MSSA bacteremia from T12-L1 epidural abscess and discitis        NOTE PENDING 89yo M with a PMHx of SSS s/p PPM, HTN, Glaucoma, pyogenic granuloma of eyelid, dementia who presented to [facility] on 4/13/19 with severe sepsis and TME with Rhabdomyolysis and  b/l LE weakness and back pain due to MSSA bacteremia from T12-L1 epidural abscess and discitis

## 2019-04-20 NOTE — PROGRESS NOTE ADULT - PROBLEM SELECTOR PLAN 2
persistent abdominal distention. was previously on bowel regimen and had several loose BMs. Still having multiple BMs-most likely due to  antibiotics  -Abd x-ray w/ ileus  -simethicone for gas  -Will consider c diff study if continued distention

## 2019-04-20 NOTE — PROGRESS NOTE ADULT - PROBLEM SELECTOR PLAN 1
Initially met severe sepsis criteria on admission T 102F, Leukocytosis 26, reported tachypnea. HR 90s. Lactate elevation to 4.9-> 3.2 -> 1.8. Found to have MSSA bacteremia secondary to discitis and T12/L1 epidural abscess. Continued bacteremia on 4/16 after T102F.   -ID recs appreciated  -BC 4/16: gram (+) cocci in clusters  -Will obtain surveillance cultures  -Continue rifampin 600 q 24hr( biofilm penetration given hardwar) -and Cefazolin 2g [id number] until (5/28/19)  -Negative TTE for endocarditis; refusing SUSANA  -Gallium with uptake only in spine  -Ortho spine recs with no surgical intervention at this time    ##severe sepsis  improved. see above for plan

## 2019-04-20 NOTE — PROGRESS NOTE ADULT - PROBLEM SELECTOR PLAN 3
Persistent tachypnea but does not feel SOB. TTE EF 60%, no vegetation. CXR  Mildly worsened bibasilar hazy opacities and worsening pleural effusion on R. s/p 2 dose lasix 20mg IVP.  - Duonebs PRN  - pulm recs appreciated  -OOB and incentive spirometer

## 2019-04-20 NOTE — PROVIDER CONTACT NOTE (CRITICAL VALUE NOTIFICATION) - TEST AND RESULT REPORTED:
positve blood cultures from  4/16/19 (aerobic  gram positive cocci and costus positve blood cultures from  4/16/19 (aerobic  gram positive cocci and clusters

## 2019-04-20 NOTE — PROGRESS NOTE ADULT - ASSESSMENT
89M with PMH of SSS s/p PPM, HTN, Glaucoma in both eyes (multiple surgeries at [facility]), pyogenic granuloma of eyelid s/p surgery, dementia presenting for b/l LE weakness and back pain in setting of altered mental status found to have MSSA bacteremia 2/2 epidural abscess and discitis

## 2019-04-20 NOTE — PROGRESS NOTE ADULT - SUBJECTIVE AND OBJECTIVE BOX
Interval Events:  Patient seen and examined at bedside.    NOTE PENDING    MEDICATIONS:  Pulmonary:  ALBUTerol/ipratropium for Nebulization 3 milliLiter(s) Nebulizer every 4 hours    Antimicrobials:  ceFAZolin  Injectable. 2000 milliGRAM(s) IV Push every 8 hours  rifampin 600 milliGRAM(s) Oral daily    Anticoagulants:  aspirin  chewable 81 milliGRAM(s) Oral daily  heparin  Injectable 5000 Unit(s) SubCutaneous every 8 hours    Cardiac:    Endocrine:  atorvastatin 20 milliGRAM(s) Oral at bedtime  dextrose 40% Gel 15 Gram(s) Oral once PRN  dextrose 50% Injectable 12.5 Gram(s) IV Push once  dextrose 50% Injectable 25 Gram(s) IV Push once  dextrose 50% Injectable 25 Gram(s) IV Push once  glucagon  Injectable 1 milliGRAM(s) IntraMuscular once PRN  insulin lispro (HumaLOG) corrective regimen sliding scale   SubCutaneous Before meals and at bedtime    Allergies    No Known Allergies    Intolerances        Vital Signs Last 24 Hrs  T(C): 37.1 (20 Apr 2019 09:41), Max: 37.4 (19 Apr 2019 21:36)  T(F): 98.8 (20 Apr 2019 09:41), Max: 99.3 (19 Apr 2019 21:36)  HR: 82 (20 Apr 2019 09:41) (71 - 88)  BP: 155/74 (20 Apr 2019 09:41) (153/91 - 157/82)  BP(mean): --  RR: 18 (20 Apr 2019 09:41) (18 - 20)  SpO2: 93% (20 Apr 2019 09:41) (93% - 98%)    04-19 @ 07:01  -  04-20 @ 07:00  --------------------------------------------------------  IN: 0 mL / OUT: 800 mL / NET: -800 mL          LABS:      CBC Full  -  ( 20 Apr 2019 07:07 )  WBC Count : 23.01 K/uL  RBC Count : 4.04 M/uL  Hemoglobin : 11.9 g/dL  Hematocrit : 36.8 %  Platelet Count - Automated : 258 K/uL  Mean Cell Volume : 91.1 fl  Mean Cell Hemoglobin : 29.5 pg  Mean Cell Hemoglobin Concentration : 32.3 gm/dL  Auto Neutrophil # : 19.84 K/uL  Auto Lymphocyte # : 1.01 K/uL  Auto Monocyte # : 1.10 K/uL  Auto Eosinophil # : 0.27 K/uL  Auto Basophil # : 0.03 K/uL  Auto Neutrophil % : 86.2 %  Auto Lymphocyte % : 4.4 %  Auto Monocyte % : 4.8 %  Auto Eosinophil % : 1.2 %  Auto Basophil % : 0.1 %    04-20    141  |  107  |  21  ----------------------------<  107<H>  4.2   |  24  |  0.52    Ca    8.2<L>      20 Apr 2019 07:07  Phos  2.7     04-20  Mg     2.0     04-20    TPro  5.5<L>  /  Alb  2.1<L>  /  TBili  0.6  /  DBili  x   /  AST  28  /  ALT  18  /  AlkPhos  81  04-20                      RADIOLOGY & ADDITIONAL STUDIES (The following images were personally reviewed): Pulmonary Progress Note    Interval Events:  Patient seen and examined at bedside.    He feels well overall, no significant change from prior day. He denies shortness of breath. Continues to have some cough. Denies fevers or chills.     MEDICATIONS:  Pulmonary:  ALBUTerol/ipratropium for Nebulization 3 milliLiter(s) Nebulizer every 4 hours    Antimicrobials:  ceFAZolin  Injectable. 2000 milliGRAM(s) IV Push every 8 hours  rifampin 600 milliGRAM(s) Oral daily    Anticoagulants:  aspirin  chewable 81 milliGRAM(s) Oral daily  heparin  Injectable 5000 Unit(s) SubCutaneous every 8 hours    Cardiac:    Endocrine:  atorvastatin 20 milliGRAM(s) Oral at bedtime  dextrose 40% Gel 15 Gram(s) Oral once PRN  dextrose 50% Injectable 12.5 Gram(s) IV Push once  dextrose 50% Injectable 25 Gram(s) IV Push once  dextrose 50% Injectable 25 Gram(s) IV Push once  glucagon  Injectable 1 milliGRAM(s) IntraMuscular once PRN  insulin lispro (HumaLOG) corrective regimen sliding scale   SubCutaneous Before meals and at bedtime    Allergies    No Known Allergies    Intolerances    Vital Signs Last 24 Hrs  T(C): 37.1 (20 Apr 2019 09:41), Max: 37.4 (19 Apr 2019 21:36)  T(F): 98.8 (20 Apr 2019 09:41), Max: 99.3 (19 Apr 2019 21:36)  HR: 82 (20 Apr 2019 09:41) (71 - 88)  BP: 155/74 (20 Apr 2019 09:41) (153/91 - 157/82)  BP(mean): --  RR: 18 (20 Apr 2019 09:41) (18 - 20)  SpO2: 93% (20 Apr 2019 09:41) (93% - 98%)    04-19 @ 07:01  -  04-20 @ 07:00  --------------------------------------------------------  IN: 0 mL / OUT: 800 mL / NET: -800 mL      Physical Exam:  Eyes: PERRLA, EOMI; R eyelid lag noted  ENMT: MMM, purplish lesion on R lower lip  Neck: No JVD, No LAD  Respiratory: Decreased air entry with bronchial breath sounds in b/l mid lung fields, no egophony, decreased BS in b/l bases  Cardiovascular: RRR, no murmurs, rubs, gallops  Gastrointestinal: Mildly distended and tympanic, NT  Extremities: Warm, no clubbing, cyanosis, 1+ non-pitting edema b/l  Vascular: 2+ distal pulses, equal  Neurological: CN II-XII grossly intact  Musculoskeletal: 5/5 strength x 4 extremities    LABS:  CBC Full  -  ( 20 Apr 2019 07:07 )  WBC Count : 23.01 K/uL  RBC Count : 4.04 M/uL  Hemoglobin : 11.9 g/dL  Hematocrit : 36.8 %  Platelet Count - Automated : 258 K/uL  Mean Cell Volume : 91.1 fl  Mean Cell Hemoglobin : 29.5 pg  Mean Cell Hemoglobin Concentration : 32.3 gm/dL  Auto Neutrophil # : 19.84 K/uL  Auto Lymphocyte # : 1.01 K/uL  Auto Monocyte # : 1.10 K/uL  Auto Eosinophil # : 0.27 K/uL  Auto Basophil # : 0.03 K/uL  Auto Neutrophil % : 86.2 %  Auto Lymphocyte % : 4.4 %  Auto Monocyte % : 4.8 %  Auto Eosinophil % : 1.2 %  Auto Basophil % : 0.1 %    04-20    141  |  107  |  21  ----------------------------<  107<H>  4.2   |  24  |  0.52    Ca    8.2<L>      20 Apr 2019 07:07  Phos  2.7     04-20  Mg     2.0     04-20    TPro  5.5<L>  /  Alb  2.1<L>  /  TBili  0.6  /  DBili  x   /  AST  28  /  ALT  18  /  AlkPhos  81  04-20    RADIOLOGY & ADDITIONAL STUDIES (The following images were personally reviewed):  Reviewed.

## 2019-04-21 LAB
ANION GAP SERPL CALC-SCNC: 10 MMOL/L — SIGNIFICANT CHANGE UP (ref 5–17)
BUN SERPL-MCNC: 24 MG/DL — HIGH (ref 7–23)
CALCIUM SERPL-MCNC: 8.4 MG/DL — SIGNIFICANT CHANGE UP (ref 8.4–10.5)
CHLORIDE SERPL-SCNC: 106 MMOL/L — SIGNIFICANT CHANGE UP (ref 96–108)
CO2 SERPL-SCNC: 23 MMOL/L — SIGNIFICANT CHANGE UP (ref 22–31)
CREAT SERPL-MCNC: 0.55 MG/DL — SIGNIFICANT CHANGE UP (ref 0.5–1.3)
CULTURE RESULTS: SIGNIFICANT CHANGE UP
GLUCOSE BLDC GLUCOMTR-MCNC: 120 MG/DL — HIGH (ref 70–99)
GLUCOSE BLDC GLUCOMTR-MCNC: 125 MG/DL — HIGH (ref 70–99)
GLUCOSE BLDC GLUCOMTR-MCNC: 96 MG/DL — SIGNIFICANT CHANGE UP (ref 70–99)
GLUCOSE BLDC GLUCOMTR-MCNC: 97 MG/DL — SIGNIFICANT CHANGE UP (ref 70–99)
GLUCOSE SERPL-MCNC: 116 MG/DL — HIGH (ref 70–99)
HCT VFR BLD CALC: 36.3 % — LOW (ref 39–50)
HGB BLD-MCNC: 11.9 G/DL — LOW (ref 13–17)
MAGNESIUM SERPL-MCNC: 2 MG/DL — SIGNIFICANT CHANGE UP (ref 1.6–2.6)
MCHC RBC-ENTMCNC: 30 PG — SIGNIFICANT CHANGE UP (ref 27–34)
MCHC RBC-ENTMCNC: 32.8 GM/DL — SIGNIFICANT CHANGE UP (ref 32–36)
MCV RBC AUTO: 91.4 FL — SIGNIFICANT CHANGE UP (ref 80–100)
NRBC # BLD: 0 /100 WBCS — SIGNIFICANT CHANGE UP (ref 0–0)
PHOSPHATE SERPL-MCNC: 2.7 MG/DL — SIGNIFICANT CHANGE UP (ref 2.5–4.5)
PLATELET # BLD AUTO: 300 K/UL — SIGNIFICANT CHANGE UP (ref 150–400)
POTASSIUM SERPL-MCNC: 3.7 MMOL/L — SIGNIFICANT CHANGE UP (ref 3.5–5.3)
POTASSIUM SERPL-SCNC: 3.7 MMOL/L — SIGNIFICANT CHANGE UP (ref 3.5–5.3)
RBC # BLD: 3.97 M/UL — LOW (ref 4.2–5.8)
RBC # FLD: 14.5 % — SIGNIFICANT CHANGE UP (ref 10.3–14.5)
SODIUM SERPL-SCNC: 139 MMOL/L — SIGNIFICANT CHANGE UP (ref 135–145)
SPECIMEN SOURCE: SIGNIFICANT CHANGE UP
WBC # BLD: 23.56 K/UL — HIGH (ref 3.8–10.5)
WBC # FLD AUTO: 23.56 K/UL — HIGH (ref 3.8–10.5)

## 2019-04-21 PROCEDURE — 99233 SBSQ HOSP IP/OBS HIGH 50: CPT | Mod: GC

## 2019-04-21 RX ORDER — POTASSIUM CHLORIDE 20 MEQ
40 PACKET (EA) ORAL ONCE
Qty: 0 | Refills: 0 | Status: COMPLETED | OUTPATIENT
Start: 2019-04-21 | End: 2019-04-21

## 2019-04-21 RX ADMIN — Medication 40 MILLIEQUIVALENT(S): at 08:41

## 2019-04-21 RX ADMIN — Medication 3 MILLILITER(S): at 11:13

## 2019-04-21 RX ADMIN — HEPARIN SODIUM 5000 UNIT(S): 5000 INJECTION INTRAVENOUS; SUBCUTANEOUS at 21:01

## 2019-04-21 RX ADMIN — ATORVASTATIN CALCIUM 20 MILLIGRAM(S): 80 TABLET, FILM COATED ORAL at 21:01

## 2019-04-21 RX ADMIN — Medication 2000 MILLIGRAM(S): at 13:37

## 2019-04-21 RX ADMIN — Medication 2000 MILLIGRAM(S): at 05:00

## 2019-04-21 RX ADMIN — HEPARIN SODIUM 5000 UNIT(S): 5000 INJECTION INTRAVENOUS; SUBCUTANEOUS at 05:06

## 2019-04-21 RX ADMIN — Medication 1 DROP(S): at 19:21

## 2019-04-21 RX ADMIN — Medication 3 MILLILITER(S): at 05:06

## 2019-04-21 RX ADMIN — Medication 3 MILLILITER(S): at 23:45

## 2019-04-21 RX ADMIN — Medication 3 MILLILITER(S): at 13:37

## 2019-04-21 RX ADMIN — Medication 1 DROP(S): at 06:36

## 2019-04-21 RX ADMIN — SIMETHICONE 80 MILLIGRAM(S): 80 TABLET, CHEWABLE ORAL at 12:17

## 2019-04-21 RX ADMIN — Medication 2000 MILLIGRAM(S): at 21:01

## 2019-04-21 RX ADMIN — Medication 3 MILLILITER(S): at 02:00

## 2019-04-21 RX ADMIN — HEPARIN SODIUM 5000 UNIT(S): 5000 INJECTION INTRAVENOUS; SUBCUTANEOUS at 13:37

## 2019-04-21 RX ADMIN — Medication 81 MILLIGRAM(S): at 12:16

## 2019-04-21 RX ADMIN — Medication 3 MILLILITER(S): at 19:21

## 2019-04-21 NOTE — PROGRESS NOTE ADULT - SUBJECTIVE AND OBJECTIVE BOX
INTERVAL EVENTS:  -- NAEO  -- fever of 101.1 around 3:30PM    SUBJECTIVE:  -- no complaints, stating he feels the same  -- does not endorse SOB, chills, rigors; did not feel feverish yesterday  -- tolerating PO intake; cannot tell me about his bowel movements and their quality  -- wants to leave hospital soon    MEDICATIONS:  MEDICATIONS  (STANDING):  ALBUTerol/ipratropium for Nebulization 3 milliLiter(s) Nebulizer every 4 hours  aspirin  chewable 81 milliGRAM(s) Oral daily  atorvastatin 20 milliGRAM(s) Oral at bedtime  ceFAZolin  Injectable. 2000 milliGRAM(s) IV Push every 8 hours  dextrose 5%. 1000 milliLiter(s) (50 mL/Hr) IV Continuous <Continuous>  dextrose 50% Injectable 12.5 Gram(s) IV Push once  dextrose 50% Injectable 25 Gram(s) IV Push once  dextrose 50% Injectable 25 Gram(s) IV Push once  heparin  Injectable 5000 Unit(s) SubCutaneous every 8 hours  insulin lispro (HumaLOG) corrective regimen sliding scale   SubCutaneous Before meals and at bedtime  rifampin 600 milliGRAM(s) Oral daily  simethicone 80 milliGRAM(s) Chew daily  timolol 0.5% Solution 1 Drop(s) Both EYES every 12 hours    MEDICATIONS  (PRN):  acetaminophen   Tablet .. 650 milliGRAM(s) Oral every 6 hours PRN Temp greater or equal to 38C (100.4F), Mild Pain (1 - 3)  dextrose 40% Gel 15 Gram(s) Oral once PRN Blood Glucose LESS THAN 70 milliGRAM(s)/deciliter  glucagon  Injectable 1 milliGRAM(s) IntraMuscular once PRN Glucose LESS THAN 70 milligrams/deciliter    Allergies: No Known Allergies    OBJECTIVE:  Vital Signs Last 24 Hrs  T(C): 36.7 (21 Apr 2019 08:51), Max: 38.4 (20 Apr 2019 15:36)  T(F): 98.1 (21 Apr 2019 08:51), Max: 101.1 (20 Apr 2019 15:36)  HR: 73 (21 Apr 2019 08:51) (73 - 95)  BP: 160/94 (21 Apr 2019 08:51) (139/85 - 165/84)  BP(mean): --  RR: 20 (21 Apr 2019 08:51) (18 - 20)  SpO2: 98% (21 Apr 2019 08:51) (95% - 98%)  I&O's Summary    20 Apr 2019 07:01  -  21 Apr 2019 07:00  --------------------------------------------------------  IN: 0 mL / OUT: 1190 mL / NET: -1190 mL    PHYSICAL EXAM:  Gen: NAD, sitting upright in bed w/NC in place  HEENT: NCAT, MMM, clear OP  Neck: supple, trachea at midline  CV: RRR, no m/g/r appreciated  Pulm: CTA B, no w/r/r, less tachypneic today  Abd: normoactive BS, soft, NTND (smaller overall than yesterday)  : +dias w/clear yellow urine  Ext: WWP, 2+ pulses x4; trace BLE pitting edema (symmetrical)  Neuro: AOx3, grossly nonfocal  Psych: pleasant, conversant and appropriate    LABS:                        11.9   23.56 )-----------( 300      ( 21 Apr 2019 06:13 )             36.3     04-21    139  |  106  |  24<H>  ----------------------------<  116<H>  3.7   |  23  |  0.55    Ca    8.4      21 Apr 2019 06:13  Phos  2.7     04-21  Mg     2.0     04-21    MICRODATA:  -- Culture - Blood (collected 20 Apr 2019 14:29)  Source: .Blood Blood  Preliminary Report (21 Apr 2019 03:01):    No growth at 12 hours    -- BCx (4/16) - +GPC (positive as of 4/20)    RADIOLOGY/OTHER STUDIES:  -- No new imaging.

## 2019-04-21 NOTE — PROGRESS NOTE ADULT - PROBLEM SELECTOR PLAN 1
Initially met severe sepsis criteria on admission T 102F, Leukocytosis 26, reported tachypnea. HR 90s. Lactate elevation to 4.9-> 3.2 -> 1.8. Found to have MSSA bacteremia secondary to discitis and T12/L1 epidural abscess. Continued bacteremia on 4/16 after T102F.   - ID recs appreciated  - BC 4/16: gram (+) cocci in clusters  - Will obtain surveillance cultures. Re-culture q24h if febrile.   - Continue Rifampin 600 q 24hr( biofilm penetration given hardwar) and Cefazolin 2g [id number] until (5/28/19)  -Negative TTE for endocarditis; refusing SUSANA --> will need to re-assess w/ID's help on Monday  -Gallium with uptake only in spine  -Ortho spine recs with no surgical intervention at this time    ##severe sepsis  - improved. see above for plan

## 2019-04-21 NOTE — PROGRESS NOTE ADULT - PROBLEM SELECTOR PLAN 8
Hx of HTN on amlodipine and losartan  - Hold in setting of sepsis.  - Plan to restart as BP tolerates.    #HLD  - Hx of HLD on home Lipitor  - continue home Lipitor 20mg    #sick sinus syndrome  - Hx of SSS s/p PPM, PPM interrogated on presentation and changed into MRI mode to evaluate back.

## 2019-04-21 NOTE — PROGRESS NOTE ADULT - PROBLEM SELECTOR PLAN 4
resolved: Most likely etiology secondary to ongoing infection- considering patient came in with severe sepsis.   - Infection: c/w Cefazolin and rifampin for MSSA bacteremia likely primary cause of encephalopathy  - Vascular: CTH negative for acute intracranial hemorrhage or infarct but noted chronic microangiopathic disease including bilateral lacunar infarcts, If no   - Vitamin B12 and Folate WNL    ##urinary retention  - Failed TOV x3, Musa replaced, UA sent off w/o evidence of UTI.  - Will continue with Musa for now.

## 2019-04-21 NOTE — PROGRESS NOTE ADULT - PROBLEM SELECTOR PLAN 3
Persistent tachypnea but does not feel SOB. TTE EF 60%, no vegetation. CXR  Mildly worsened bibasilar hazy opacities and worsening pleural effusion on R. s/p 2 dose lasix 20mg IVP.  - Duonebs PRN  - pulm recs appreciated  - OOB and incentive spirometer

## 2019-04-21 NOTE — PROGRESS NOTE ADULT - ASSESSMENT
Briefly, this is a 89yo gentleman with a PMH of mild dementia, SSS s/p PPM, HTN, bilateral glaucoma and pyogenic granuloma of eyelid s/p surgery who p/w BLE weakness, back pain and infectious encephalopathy i/s/o severe sepsis 2/2 MSSA bacteremia c/b epidural abscess and T12-L1 discitis.  Over the weekend remained HD stable w/[id number] mental and respiratory status; however, blood cultures slowly growing GPC (4/16 --> 4/20), prompting concern for source control.

## 2019-04-21 NOTE — PROGRESS NOTE ADULT - PROBLEM SELECTOR PLAN 2
persistent abdominal distention. was previously on bowel regimen and had several loose BMs. Still having multiple BMs-most likely due to  antibiotics.  - Abd x-ray w/ ileus  - Simethicone for gas  - Will consider C diff study if continued distention or increase in loose BM's.

## 2019-04-22 DIAGNOSIS — R14.0 ABDOMINAL DISTENSION (GASEOUS): ICD-10-CM

## 2019-04-22 LAB
-  CEFAZOLIN: SIGNIFICANT CHANGE UP
-  CLINDAMYCIN: SIGNIFICANT CHANGE UP
-  ERYTHROMYCIN: SIGNIFICANT CHANGE UP
-  LINEZOLID: SIGNIFICANT CHANGE UP
-  OXACILLIN: SIGNIFICANT CHANGE UP
-  PENICILLIN: SIGNIFICANT CHANGE UP
-  RIFAMPIN: SIGNIFICANT CHANGE UP
-  TRIMETHOPRIM/SULFAMETHOXAZOLE: SIGNIFICANT CHANGE UP
-  VANCOMYCIN: SIGNIFICANT CHANGE UP
ANION GAP SERPL CALC-SCNC: 8 MMOL/L — SIGNIFICANT CHANGE UP (ref 5–17)
BASOPHILS # BLD AUTO: 0 K/UL — SIGNIFICANT CHANGE UP (ref 0–0.2)
BASOPHILS NFR BLD AUTO: 0 % — SIGNIFICANT CHANGE UP (ref 0–2)
BUN SERPL-MCNC: 20 MG/DL — SIGNIFICANT CHANGE UP (ref 7–23)
CALCIUM SERPL-MCNC: 8.7 MG/DL — SIGNIFICANT CHANGE UP (ref 8.4–10.5)
CHLORIDE SERPL-SCNC: 105 MMOL/L — SIGNIFICANT CHANGE UP (ref 96–108)
CO2 SERPL-SCNC: 24 MMOL/L — SIGNIFICANT CHANGE UP (ref 22–31)
CREAT SERPL-MCNC: 0.47 MG/DL — LOW (ref 0.5–1.3)
CULTURE RESULTS: SIGNIFICANT CHANGE UP
EOSINOPHIL # BLD AUTO: 0 K/UL — SIGNIFICANT CHANGE UP (ref 0–0.5)
EOSINOPHIL NFR BLD AUTO: 0 % — SIGNIFICANT CHANGE UP (ref 0–6)
GLUCOSE BLDC GLUCOMTR-MCNC: 113 MG/DL — HIGH (ref 70–99)
GLUCOSE BLDC GLUCOMTR-MCNC: 119 MG/DL — HIGH (ref 70–99)
GLUCOSE BLDC GLUCOMTR-MCNC: 125 MG/DL — HIGH (ref 70–99)
GLUCOSE BLDC GLUCOMTR-MCNC: 128 MG/DL — HIGH (ref 70–99)
GLUCOSE SERPL-MCNC: 123 MG/DL — HIGH (ref 70–99)
HCT VFR BLD CALC: 37.4 % — LOW (ref 39–50)
HGB BLD-MCNC: 12.1 G/DL — LOW (ref 13–17)
LYMPHOCYTES # BLD AUTO: 1.42 K/UL — SIGNIFICANT CHANGE UP (ref 1–3.3)
LYMPHOCYTES # BLD AUTO: 7 % — LOW (ref 13–44)
MAGNESIUM SERPL-MCNC: 2 MG/DL — SIGNIFICANT CHANGE UP (ref 1.6–2.6)
MCHC RBC-ENTMCNC: 29.7 PG — SIGNIFICANT CHANGE UP (ref 27–34)
MCHC RBC-ENTMCNC: 32.4 GM/DL — SIGNIFICANT CHANGE UP (ref 32–36)
MCV RBC AUTO: 91.7 FL — SIGNIFICANT CHANGE UP (ref 80–100)
METHOD TYPE: SIGNIFICANT CHANGE UP
MONOCYTES # BLD AUTO: 1.24 K/UL — HIGH (ref 0–0.9)
MONOCYTES NFR BLD AUTO: 6.1 % — SIGNIFICANT CHANGE UP (ref 2–14)
NEUTROPHILS # BLD AUTO: 17.6 K/UL — HIGH (ref 1.8–7.4)
NEUTROPHILS NFR BLD AUTO: 86.9 % — HIGH (ref 43–77)
ORGANISM # SPEC MICROSCOPIC CNT: SIGNIFICANT CHANGE UP
ORGANISM # SPEC MICROSCOPIC CNT: SIGNIFICANT CHANGE UP
PLATELET # BLD AUTO: 320 K/UL — SIGNIFICANT CHANGE UP (ref 150–400)
POTASSIUM SERPL-MCNC: 4.1 MMOL/L — SIGNIFICANT CHANGE UP (ref 3.5–5.3)
POTASSIUM SERPL-SCNC: 4.1 MMOL/L — SIGNIFICANT CHANGE UP (ref 3.5–5.3)
RBC # BLD: 4.08 M/UL — LOW (ref 4.2–5.8)
RBC # FLD: 14.4 % — SIGNIFICANT CHANGE UP (ref 10.3–14.5)
SODIUM SERPL-SCNC: 137 MMOL/L — SIGNIFICANT CHANGE UP (ref 135–145)
SPECIMEN SOURCE: SIGNIFICANT CHANGE UP
WBC # BLD: 20.25 K/UL — HIGH (ref 3.8–10.5)
WBC # FLD AUTO: 20.25 K/UL — HIGH (ref 3.8–10.5)

## 2019-04-22 PROCEDURE — 99232 SBSQ HOSP IP/OBS MODERATE 35: CPT | Mod: GC

## 2019-04-22 PROCEDURE — 99233 SBSQ HOSP IP/OBS HIGH 50: CPT | Mod: GC

## 2019-04-22 RX ORDER — SENNA PLUS 8.6 MG/1
1 TABLET ORAL DAILY
Qty: 0 | Refills: 0 | Status: DISCONTINUED | OUTPATIENT
Start: 2019-04-22 | End: 2019-04-25

## 2019-04-22 RX ORDER — DOCUSATE SODIUM 100 MG
100 CAPSULE ORAL
Qty: 0 | Refills: 0 | Status: DISCONTINUED | OUTPATIENT
Start: 2019-04-22 | End: 2019-04-25

## 2019-04-22 RX ORDER — AMLODIPINE BESYLATE 2.5 MG/1
5 TABLET ORAL DAILY
Qty: 0 | Refills: 0 | Status: DISCONTINUED | OUTPATIENT
Start: 2019-04-22 | End: 2019-04-25

## 2019-04-22 RX ORDER — IPRATROPIUM/ALBUTEROL SULFATE 18-103MCG
3 AEROSOL WITH ADAPTER (GRAM) INHALATION EVERY 6 HOURS
Qty: 0 | Refills: 0 | Status: DISCONTINUED | OUTPATIENT
Start: 2019-04-22 | End: 2019-04-25

## 2019-04-22 RX ADMIN — Medication 2000 MILLIGRAM(S): at 06:00

## 2019-04-22 RX ADMIN — Medication 81 MILLIGRAM(S): at 12:10

## 2019-04-22 RX ADMIN — AMLODIPINE BESYLATE 5 MILLIGRAM(S): 2.5 TABLET ORAL at 12:09

## 2019-04-22 RX ADMIN — HEPARIN SODIUM 5000 UNIT(S): 5000 INJECTION INTRAVENOUS; SUBCUTANEOUS at 06:01

## 2019-04-22 RX ADMIN — Medication 1 DROP(S): at 06:02

## 2019-04-22 RX ADMIN — Medication 100 MILLIGRAM(S): at 18:07

## 2019-04-22 RX ADMIN — Medication 1 DROP(S): at 18:07

## 2019-04-22 RX ADMIN — HEPARIN SODIUM 5000 UNIT(S): 5000 INJECTION INTRAVENOUS; SUBCUTANEOUS at 13:29

## 2019-04-22 RX ADMIN — Medication 3 MILLILITER(S): at 03:16

## 2019-04-22 RX ADMIN — Medication 2000 MILLIGRAM(S): at 13:38

## 2019-04-22 RX ADMIN — SENNA PLUS 1 TABLET(S): 8.6 TABLET ORAL at 12:10

## 2019-04-22 RX ADMIN — SIMETHICONE 80 MILLIGRAM(S): 80 TABLET, CHEWABLE ORAL at 12:10

## 2019-04-22 RX ADMIN — Medication 3 MILLILITER(S): at 07:36

## 2019-04-22 NOTE — PROGRESS NOTE ADULT - ASSESSMENT
89M with PMH of SSS s/p PPM, L4-S1 post spinal fusion, L2-S1 laminectomy, HTN, Glaucoma, pyogenic granuloma of eyelid s/p surgery, dementia presenting for b/l LE weakness and back pain, found to have T12-L1 discitis and MSSA BSI.      - patient does not want SUSANA or any invasive procedures  - c/w cefazolin 2g IV q8h (possible end 5/28/19) - he likely will need subsequent suppression  - c/w rifampin 600mg PO q24h for biofilm penetration given hardware(possible 5/28/19)  - please contact ID team when close to discharge to arrange follow up with Dr. Ann  - please do not place PICC line until surveillance blood cultures negative for at least 5 days.     ID team 1 to will signoff, please call with any questions

## 2019-04-22 NOTE — PROGRESS NOTE ADULT - PROBLEM SELECTOR PLAN 1
Pulmonology consulted for tachypnea with increased oxygen requirements from baseline. Vitals and I/Os reviewed.   -Largely resolved.   - Etiology likely multifactorial including small b/l Pleural effusions b/l atelectasis leading to formation of dead space and worsening VQ mismatch as well as splinting from pain.  - POCUS this morning showing small bilateral simple effusions associated with consolidation bilaterally consistent with atelectasis. A-Line predominant pattern otherwise anteriorly.     Recommend:  -Pt states he has not but up and walking, and has not been out of bed much. Mobility and PT are very important in this case to help resolve the patients atelectasis and effusions.  - Continue Incentive Spirometry use 10x/hour while awake  - PT and Frequent ambulation.   - OOB to chair as often as tolerated.   - WBC count stable.

## 2019-04-22 NOTE — PROGRESS NOTE ADULT - SUBJECTIVE AND OBJECTIVE BOX
INFECTIOUS DISEASE CONSULT PROGRESS NOTE    INTERVAL HPI/OVERNIGHT EVENTS: BCx from 4/16 growing s. aureus. ID called regarding if PICC can be placed    ACTIVE ANTIMICROBIALS/ANTIBIOTICS:  ceFAZolin  Injectable. 2000 milliGRAM(s) IV Push every 8 hours  rifampin 600 milliGRAM(s) Oral daily      VITAL SIGNS:  ICU Vital Signs Last 24 Hrs  T(C): 36.7 (22 Apr 2019 09:06), Max: 37.1 (21 Apr 2019 16:01)  T(F): 98 (22 Apr 2019 09:06), Max: 98.7 (21 Apr 2019 16:01)  HR: 81 (22 Apr 2019 09:06) (81 - 90)  BP: 175/82 (22 Apr 2019 09:06) (150/81 - 175/82)  BP(mean): --  ABP: --  ABP(mean): --  RR: 18 (22 Apr 2019 09:06) (18 - 20)  SpO2: 100% (22 Apr 2019 09:06) (95% - 100%)      PHYSICAL EXAM:  Constitutional: elderly male comfortable in bed  Head: NC/AT  Eyes: PERRL, anicteric sclera  ENMT: no rhinorrhea; clear oropharynx; MMM  Neck: supple  Respiratory: audible wheezing b/l  Cardiovascular: +S1/S2, RRR, right chest wall PPM site w/o pain or erythema/swelling  Gastrointestinal: soft, NT/ND; intact BS  Extremities: WWP; no clubbing, cyanosis, or edema  Vascular: 2+ radial, DP/PT pulses B/L  Dermatologic: skin warm and dry; no visible rashes or lesions  Neurologic: AAOx3, 4/5 strength in lower extremities with normal sensation, 5/5 in upper extremities     LABS:                        12.1   20.25 )-----------( 320      ( 22 Apr 2019 06:38 )             37.4       04-22    137  |  105  |  20  ----------------------------<  123<H>  4.1   |  24  |  0.47<L>    Ca    8.7      22 Apr 2019 06:38  Phos  2.7     04-21  Mg     2.0     04-22            MICROBIOLOGY:    Culture - Blood (collected 04-20-19 @ 14:29)  Source: .Blood Blood  Preliminary Report (04-21-19 @ 15:01):    No growth at 1 day.        RADIOLOGY & ADDITIONAL STUDIES:

## 2019-04-22 NOTE — PROGRESS NOTE ADULT - ASSESSMENT
Briefly, this is a 89yo gentleman with a PMH of mild dementia, SSS s/p PPM, HTN, bilateral glaucoma and pyogenic granuloma of eyelid s/p surgery who p/w BLE weakness, back pain and infectious encephalopathy i/s/o severe sepsis 2/2 MSSA bacteremia c/b epidural abscess and T12-L1 discitis. Improving respiratory status.

## 2019-04-22 NOTE — CHART NOTE - NSCHARTNOTEFT_GEN_A_CORE
Admitting Diagnosis:   Patient is a 89y old  Male who presents with a chief complaint of Weakness, Altered mental status (22 Apr 2019 09:37)      PAST MEDICAL & SURGICAL HISTORY:  RBBB (right bundle branch block with left anterior fascicular block)  HLD (hyperlipidemia)  Essential hypertension  S/P lumbar spinal fusion  H/O laminectomy      Current Nutrition Order: DASH/TLC        PO Intake: Good (%) [   ]  Fair (50-75%) [  x ] Poor (<25%) [   ]    GI Issues: large brown soft BM this AM, no noted n/v/c     Pain: no pain reported, appearing comfortable     Skin Integrity: stage 1 pressure ulcer per record     Labs:   04-22    137  |  105  |  20  ----------------------------<  123<H>  4.1   |  24  |  0.47<L>    Ca    8.7      22 Apr 2019 06:38  Phos  2.7     04-21  Mg     2.0     04-22      CAPILLARY BLOOD GLUCOSE      POCT Blood Glucose.: 113 mg/dL (22 Apr 2019 08:18)  POCT Blood Glucose.: 125 mg/dL (21 Apr 2019 21:56)  POCT Blood Glucose.: 120 mg/dL (21 Apr 2019 17:37)  POCT Blood Glucose.: 97 mg/dL (21 Apr 2019 11:54)      Medications:  MEDICATIONS  (STANDING):  ALBUTerol/ipratropium for Nebulization 3 milliLiter(s) Nebulizer every 4 hours  amLODIPine   Tablet 5 milliGRAM(s) Oral daily  aspirin  chewable 81 milliGRAM(s) Oral daily  atorvastatin 20 milliGRAM(s) Oral at bedtime  ceFAZolin  Injectable. 2000 milliGRAM(s) IV Push every 8 hours  dextrose 5%. 1000 milliLiter(s) (50 mL/Hr) IV Continuous <Continuous>  dextrose 50% Injectable 12.5 Gram(s) IV Push once  dextrose 50% Injectable 25 Gram(s) IV Push once  dextrose 50% Injectable 25 Gram(s) IV Push once  docusate sodium 100 milliGRAM(s) Oral two times a day  heparin  Injectable 5000 Unit(s) SubCutaneous every 8 hours  insulin lispro (HumaLOG) corrective regimen sliding scale   SubCutaneous Before meals and at bedtime  rifampin 600 milliGRAM(s) Oral daily  senna 1 Tablet(s) Oral daily  simethicone 80 milliGRAM(s) Chew daily  timolol 0.5% Solution 1 Drop(s) Both EYES every 12 hours    MEDICATIONS  (PRN):  acetaminophen   Tablet .. 650 milliGRAM(s) Oral every 6 hours PRN Temp greater or equal to 38C (100.4F), Mild Pain (1 - 3)  dextrose 40% Gel 15 Gram(s) Oral once PRN Blood Glucose LESS THAN 70 milliGRAM(s)/deciliter  glucagon  Injectable 1 milliGRAM(s) IntraMuscular once PRN Glucose LESS THAN 70 milligrams/deciliter      Weight: 83kg     Weight Change: none, please retake     Nutrition Focused Physical Exam: Completed [   ]  Not Pertinent [ x  ]    Estimated energy needs:   ABW used for calculations as pt between % of IBW.   ABW 83kg, IBW 75kg, 110% IBW, ht 70", BMI 26.3   Nutrient needs based on [facility] standards of care for repletion in older adults, adjusted for sepsis/ infection  2075-2490kcal (25-30kcal/kg)   99-116g pro (1.2-1.4g/kg pro)   2490-2905ml (30-35ml/kg)     Subjective:   89M with PMH of SSS s/p PPM, HTN, Glaucoma in both eyes (multiple surgeries at [facility]), pyogenic granuloma of eyelid s/p surgery, dementia presenting for b/l LE weakness and back pain in setting of altered mental status found to have severe sepsis 2/2 MSSA bacteremia 2/2 epidural abscess and discitis. Mental status slowly returning to baseline per report however still A&Ox1-2 which is baseline. Sepsis now resolved, HD stable over weekend later spiking fever, noted cultures growing GPC, being managed/ further monitored at this time. Will continue to follow per protocol.     Previous Nutrition Diagnosis: Increased nutrient needs r/t infection/ stage 1 pressure ulcer AEB hypermetabolic state     Active [x   ]  Resolved [   ]    If resolved, new PES:     Goal: meet >75% EER     Recommendations:  1. Encourage intake through day, assist with feeds as needed   2. Manage pain prn   3. Monitor and replete lytes   4. Trend wts   5. Add Ensure Enlive TID (1050 kcal, 60g protein, 540mL free H2O)     Education: n/a mental status, encouraged intake     Risk Level: High [ x  ] Moderate [   ] Low [   ]

## 2019-04-22 NOTE — PROGRESS NOTE ADULT - PROBLEM SELECTOR PLAN 1
Initially met severe sepsis criteria on admission T 102F, Leukocytosis 26, reported tachypnea. HR 90s. Lactate elevation to 4.9-> 3.2 -> 1.8. Found to have MSSA bacteremia secondary to discitis and T12/L1 epidural abscess. Continued bacteremia on 4/16 after T102F.   - ID recs appreciated  - BC 4/16: gram (+) cocci in clusters  - Will obtain surveillance cultures. Re-culture q24h if febrile.   - Continue Rifampin 600 q 24hr( biofilm penetration given hardwar) and Cefazolin 2g [id number] until (5/28/19)  -Negative TTE for endocarditis; refusing SUSANA --> will need to re-assess w/ID's help on Monday  -Gallium with uptake only in spine  -Ortho spine recs with no surgical intervention at this time    ##severe sepsis  - improved. see above for plan Initially met severe sepsis criteria on admission T 102F, Leukocytosis 26, reported tachypnea. HR 90s. Lactate elevation to 4.9-> 3.2 -> 1.8. Found to have MSSA bacteremia secondary to discitis and T12/L1 epidural abscess. Continued bacteremia on 4/16 after T102F.   - ID recs appreciated  - BC 4/16: gram (+) cocci in clusters  - BC 4/20: NGTD  - Continue Rifampin 600 q 24hr( biofilm penetration given hardware) and Cefazolin 2g [id number] until (5/28/19). Will need PICC  -Negative TTE for endocarditis; refusing SUSANA --> will need to re-assess w/ID's help on Monday  -Gallium with uptake only in spine  -Ortho spine recs with no surgical intervention at this time  -improving leukocytosis    ##severe sepsis  - improved. see above for plan

## 2019-04-22 NOTE — PROGRESS NOTE ADULT - PROBLEM SELECTOR PLAN 2
persistent abdominal distention. was previously on bowel regimen and had several loose BMs. Still having multiple BMs-most likely due to  antibiotics.  - Abd x-ray w/ ileus  - Simethicone for gas  - Will consider C diff study if continued distention or increase in loose BM's. No BM yesterday night. Persistent abdominal distention. was previously on bowel regimen and had several loose BMs. Still having multiple BMs-most likely due to  antibiotics.  - Abd x-ray w/ ileus  - Simethicone for gas  - Senna and colace for continued distention.

## 2019-04-22 NOTE — PROGRESS NOTE ADULT - PROBLEM SELECTOR PLAN 2
-POCUS done again today as noted above.  - The etiology of the fluid is likely volume overload.     Recommend  - No role for thoracentesis at this time  - Would recommend PT and ambulation, OOB to chair as noted above.   - Pt net 11L neg, hold off on diuresis at this time

## 2019-04-22 NOTE — PROGRESS NOTE ADULT - PROBLEM SELECTOR PLAN 8
Hx of HTN on amlodipine and losartan  - Hold in setting of sepsis.  - Plan to restart as BP tolerates.    #HLD  - Hx of HLD on home Lipitor  - continue home Lipitor 20mg    #sick sinus syndrome  - Hx of SSS s/p PPM, PPM interrogated on presentation and changed into MRI mode to evaluate back. Hx of HTN on amlodipine and losartan  Continued HTN. Will restart amlodipine  - Plan to restart losartan as BP tolerates.    #HLD  - Hx of HLD on home Lipitor  - continue home Lipitor 20mg    #sick sinus syndrome  - Hx of SSS s/p PPM, PPM interrogated on presentation and changed into MRI mode to evaluate back.

## 2019-04-22 NOTE — PROGRESS NOTE ADULT - SUBJECTIVE AND OBJECTIVE BOX
SUBJECTIVE / INTERVAL HPI: PO. Patient seen and examined at bedside. Patient without new complaints. back pain is present but only with movement. He states he is tired of being in the hospital.  No BMs o/n.     VITAL SIGNS:  Vital Signs Last 24 Hrs  T(C): 36.7 (22 Apr 2019 09:06), Max: 37.1 (21 Apr 2019 16:01)  T(F): 98 (22 Apr 2019 09:06), Max: 98.7 (21 Apr 2019 16:01)  HR: 81 (22 Apr 2019 09:06) (81 - 90)  BP: 175/82 (22 Apr 2019 09:06) (150/81 - 175/82)  BP(mean): --  RR: 18 (22 Apr 2019 09:06) (18 - 20)  SpO2: 100% (22 Apr 2019 09:06) (95% - 100%)    PHYSICAL EXAM:    General: sitting in chair and slumping to the left and in no acute distress  HEENT: normocephalic, atraumatic, PERRL, anicteric sclera; no conjunctival pallor, mucus membranes moist  Neck: supple, no masses  Cardiovascular: +S1/S2, regular rate and rhythm; no murmurs, no rub, no gallop  Respiratory: (+) slight crackles at R mid lung field-unchanged, no rhonchi, no wheeze, no rales  Gastrointestinal: soft, active bowel sounds, non-tender, (+) distended  Extremities: Warm, dry; no edema, clubbing or cyanosis  Vascular: 2+ radial, DP pulses bilaterally  Neurological: AAOx3; no focal deficits. strength: 4/5 BLE-improved RLE strength.     MEDICATIONS:  MEDICATIONS  (STANDING):  ALBUTerol/ipratropium for Nebulization 3 milliLiter(s) Nebulizer every 4 hours  amLODIPine   Tablet 5 milliGRAM(s) Oral daily  aspirin  chewable 81 milliGRAM(s) Oral daily  atorvastatin 20 milliGRAM(s) Oral at bedtime  ceFAZolin  Injectable. 2000 milliGRAM(s) IV Push every 8 hours  dextrose 5%. 1000 milliLiter(s) (50 mL/Hr) IV Continuous <Continuous>  dextrose 50% Injectable 12.5 Gram(s) IV Push once  dextrose 50% Injectable 25 Gram(s) IV Push once  dextrose 50% Injectable 25 Gram(s) IV Push once  docusate sodium 100 milliGRAM(s) Oral two times a day  heparin  Injectable 5000 Unit(s) SubCutaneous every 8 hours  insulin lispro (HumaLOG) corrective regimen sliding scale   SubCutaneous Before meals and at bedtime  rifampin 600 milliGRAM(s) Oral daily  senna 1 Tablet(s) Oral daily  simethicone 80 milliGRAM(s) Chew daily  timolol 0.5% Solution 1 Drop(s) Both EYES every 12 hours    MEDICATIONS  (PRN):  acetaminophen   Tablet .. 650 milliGRAM(s) Oral every 6 hours PRN Temp greater or equal to 38C (100.4F), Mild Pain (1 - 3)  dextrose 40% Gel 15 Gram(s) Oral once PRN Blood Glucose LESS THAN 70 milliGRAM(s)/deciliter  glucagon  Injectable 1 milliGRAM(s) IntraMuscular once PRN Glucose LESS THAN 70 milligrams/deciliter      ALLERGIES:  Allergies    No Known Allergies    Intolerances        LABS:                        12.1   20.25 )-----------( 320      ( 22 Apr 2019 06:38 )             37.4     04-22    137  |  105  |  20  ----------------------------<  123<H>  4.1   |  24  |  0.47<L>    Ca    8.7      22 Apr 2019 06:38  Phos  2.7     04-21  Mg     2.0     04-22          CAPILLARY BLOOD GLUCOSE      POCT Blood Glucose.: 113 mg/dL (22 Apr 2019 08:18)      RADIOLOGY & ADDITIONAL TESTS: Reviewed.

## 2019-04-22 NOTE — PROGRESS NOTE ADULT - PROBLEM SELECTOR PLAN 4
resolved: Most likely etiology secondary to ongoing infection- considering patient came in with severe sepsis.   - Infection: c/w Cefazolin and rifampin for MSSA bacteremia likely primary cause of encephalopathy  - Vascular: CTH negative for acute intracranial hemorrhage or infarct but noted chronic microangiopathic disease including bilateral lacunar infarcts, If no   - Vitamin B12 and Folate WNL    ##urinary retention  - Failed TOV x3, Musa replaced, UA sent off w/o evidence of UTI.  - Will continue with Musa for now. resolving: Most likely etiology secondary to ongoing infection- considering patient came in with severe sepsis.   - Infection: c/w Cefazolin and rifampin for MSSA bacteremia likely primary cause of encephalopathy  - Vascular: CTH negative for acute intracranial hemorrhage or infarct but noted chronic microangiopathic disease including bilateral lacunar infarcts, If no   - Vitamin B12 and Folate WNL    ##urinary retention  Failed TOV x3, Musa replaced, UA sent off w/o evidence of UTI.  -Attempt TOV today.

## 2019-04-22 NOTE — PROGRESS NOTE ADULT - SUBJECTIVE AND OBJECTIVE BOX
Interval Events:  Patient seen and examined at bedside.    MEDICATIONS:  Pulmonary:  ALBUTerol/ipratropium for Nebulization 3 milliLiter(s) Nebulizer every 4 hours    Antimicrobials:  ceFAZolin  Injectable. 2000 milliGRAM(s) IV Push every 8 hours  rifampin 600 milliGRAM(s) Oral daily    Anticoagulants:  aspirin  chewable 81 milliGRAM(s) Oral daily  heparin  Injectable 5000 Unit(s) SubCutaneous every 8 hours    Cardiac:  amLODIPine   Tablet 5 milliGRAM(s) Oral daily    Endocrine:  atorvastatin 20 milliGRAM(s) Oral at bedtime  dextrose 40% Gel 15 Gram(s) Oral once PRN  dextrose 50% Injectable 12.5 Gram(s) IV Push once  dextrose 50% Injectable 25 Gram(s) IV Push once  dextrose 50% Injectable 25 Gram(s) IV Push once  glucagon  Injectable 1 milliGRAM(s) IntraMuscular once PRN  insulin lispro (HumaLOG) corrective regimen sliding scale   SubCutaneous Before meals and at bedtime    Allergies    No Known Allergies    Intolerances        Vital Signs Last 24 Hrs  T(C): 36.7 (22 Apr 2019 09:06), Max: 37.1 (21 Apr 2019 16:01)  T(F): 98 (22 Apr 2019 09:06), Max: 98.7 (21 Apr 2019 16:01)  HR: 81 (22 Apr 2019 09:06) (81 - 90)  BP: 175/82 (22 Apr 2019 09:06) (150/81 - 175/82)  BP(mean): --  RR: 18 (22 Apr 2019 09:06) (18 - 20)  SpO2: 100% (22 Apr 2019 09:06) (95% - 100%)    04-21 @ 07:01 - 04-22 @ 07:00  --------------------------------------------------------  IN: 0 mL / OUT: 840 mL / NET: -840 mL    04-22 @ 07:01  - 04-22 @ 09:38  --------------------------------------------------------  IN: 0 mL / OUT: 600 mL / NET: -600 mL          LABS:      CBC Full  -  ( 22 Apr 2019 06:38 )  WBC Count : 20.25 K/uL  RBC Count : 4.08 M/uL  Hemoglobin : 12.1 g/dL  Hematocrit : 37.4 %  Platelet Count - Automated : 320 K/uL  Mean Cell Volume : 91.7 fl  Mean Cell Hemoglobin : 29.7 pg  Mean Cell Hemoglobin Concentration : 32.4 gm/dL  Auto Neutrophil # : 17.60 K/uL  Auto Lymphocyte # : 1.42 K/uL  Auto Monocyte # : 1.24 K/uL  Auto Eosinophil # : 0.00 K/uL  Auto Basophil # : 0.00 K/uL  Auto Neutrophil % : 86.9 %  Auto Lymphocyte % : 7.0 %  Auto Monocyte % : 6.1 %  Auto Eosinophil % : 0.0 %  Auto Basophil % : 0.0 %    04-22    137  |  105  |  20  ----------------------------<  123<H>  4.1   |  24  |  0.47<L>    Ca    8.7      22 Apr 2019 06:38  Phos  2.7     04-21  Mg     2.0     04-22                        RADIOLOGY & ADDITIONAL STUDIES (The following images were personally reviewed): Interval Events:  Patient seen and examined at bedside. The patient notes that he as has a mild cough, its not productive. Otherwise he has no acute complaints, denies fever or malaise.     MEDICATIONS:  Pulmonary:  ALBUTerol/ipratropium for Nebulization 3 milliLiter(s) Nebulizer every 4 hours    Antimicrobials:  ceFAZolin  Injectable. 2000 milliGRAM(s) IV Push every 8 hours  rifampin 600 milliGRAM(s) Oral daily    Anticoagulants:  aspirin  chewable 81 milliGRAM(s) Oral daily  heparin  Injectable 5000 Unit(s) SubCutaneous every 8 hours    Cardiac:  amLODIPine   Tablet 5 milliGRAM(s) Oral daily    Endocrine:  atorvastatin 20 milliGRAM(s) Oral at bedtime  dextrose 40% Gel 15 Gram(s) Oral once PRN  dextrose 50% Injectable 12.5 Gram(s) IV Push once  dextrose 50% Injectable 25 Gram(s) IV Push once  dextrose 50% Injectable 25 Gram(s) IV Push once  glucagon  Injectable 1 milliGRAM(s) IntraMuscular once PRN  insulin lispro (HumaLOG) corrective regimen sliding scale   SubCutaneous Before meals and at bedtime    Allergies    No Known Allergies    Intolerances        Vital Signs Last 24 Hrs  T(C): 36.7 (22 Apr 2019 09:06), Max: 37.1 (21 Apr 2019 16:01)  T(F): 98 (22 Apr 2019 09:06), Max: 98.7 (21 Apr 2019 16:01)  HR: 81 (22 Apr 2019 09:06) (81 - 90)  BP: 175/82 (22 Apr 2019 09:06) (150/81 - 175/82)  BP(mean): --  RR: 18 (22 Apr 2019 09:06) (18 - 20)  SpO2: 100% (22 Apr 2019 09:06) (95% - 100%)    PHYSICAL EXAM:  Constitutional: NAD, A&Ox2  Eyes: PERRLA, EOMI; R eyelid lag noted  ENMT: MMM, purplish lesion on R lower lip  Neck: No JVD, No LAD  Respiratory: Decreased air entry with bronchial breath sounds in b/l mid lung fields, no egophony, decreased BS in b/l bases  Cardiovascular: RRR, no murmurs, rubs, gallops  Gastrointestinal: Mildly distended and tympanic, NT  Extremities: Warm, no clubbing, cyanosis, 1+ non-pitting edema b/l  Vascular: 2+ distal pulses, equal  Neurological: CN II-XII grossly intact  Musculoskeletal: 5/5 strength x 4 extremities    04-21 @ 07:01 - 04-22 @ 07:00  --------------------------------------------------------  IN: 0 mL / OUT: 840 mL / NET: -840 mL    04-22 @ 07:01 - 04-22 @ 09:38  --------------------------------------------------------  IN: 0 mL / OUT: 600 mL / NET: -600 mL          LABS:      CBC Full  -  ( 22 Apr 2019 06:38 )  WBC Count : 20.25 K/uL  RBC Count : 4.08 M/uL  Hemoglobin : 12.1 g/dL  Hematocrit : 37.4 %  Platelet Count - Automated : 320 K/uL  Mean Cell Volume : 91.7 fl  Mean Cell Hemoglobin : 29.7 pg  Mean Cell Hemoglobin Concentration : 32.4 gm/dL  Auto Neutrophil # : 17.60 K/uL  Auto Lymphocyte # : 1.42 K/uL  Auto Monocyte # : 1.24 K/uL  Auto Eosinophil # : 0.00 K/uL  Auto Basophil # : 0.00 K/uL  Auto Neutrophil % : 86.9 %  Auto Lymphocyte % : 7.0 %  Auto Monocyte % : 6.1 %  Auto Eosinophil % : 0.0 %  Auto Basophil % : 0.0 %    04-22    137  |  105  |  20  ----------------------------<  123<H>  4.1   |  24  |  0.47<L>    Ca    8.7      22 Apr 2019 06:38  Phos  2.7     04-21  Mg     2.0     04-22                        RADIOLOGY & ADDITIONAL STUDIES (The following images were personally reviewed):

## 2019-04-23 LAB
ANION GAP SERPL CALC-SCNC: 12 MMOL/L — SIGNIFICANT CHANGE UP (ref 5–17)
BASOPHILS # BLD AUTO: 0.04 K/UL — SIGNIFICANT CHANGE UP (ref 0–0.2)
BASOPHILS NFR BLD AUTO: 0.2 % — SIGNIFICANT CHANGE UP (ref 0–2)
BUN SERPL-MCNC: 19 MG/DL — SIGNIFICANT CHANGE UP (ref 7–23)
CALCIUM SERPL-MCNC: 8.9 MG/DL — SIGNIFICANT CHANGE UP (ref 8.4–10.5)
CHLORIDE SERPL-SCNC: 102 MMOL/L — SIGNIFICANT CHANGE UP (ref 96–108)
CO2 SERPL-SCNC: 23 MMOL/L — SIGNIFICANT CHANGE UP (ref 22–31)
CREAT SERPL-MCNC: 0.45 MG/DL — LOW (ref 0.5–1.3)
CULTURE RESULTS: SIGNIFICANT CHANGE UP
EOSINOPHIL # BLD AUTO: 0.16 K/UL — SIGNIFICANT CHANGE UP (ref 0–0.5)
EOSINOPHIL NFR BLD AUTO: 0.9 % — SIGNIFICANT CHANGE UP (ref 0–6)
EXTRA GREEN TOP TUBE: SIGNIFICANT CHANGE UP
GLUCOSE BLDC GLUCOMTR-MCNC: 102 MG/DL — HIGH (ref 70–99)
GLUCOSE BLDC GLUCOMTR-MCNC: 108 MG/DL — HIGH (ref 70–99)
GLUCOSE BLDC GLUCOMTR-MCNC: 97 MG/DL — SIGNIFICANT CHANGE UP (ref 70–99)
GLUCOSE BLDC GLUCOMTR-MCNC: 98 MG/DL — SIGNIFICANT CHANGE UP (ref 70–99)
GLUCOSE SERPL-MCNC: 106 MG/DL — HIGH (ref 70–99)
HCT VFR BLD CALC: 39.9 % — SIGNIFICANT CHANGE UP (ref 39–50)
HGB BLD-MCNC: 12.4 G/DL — LOW (ref 13–17)
IMM GRANULOCYTES NFR BLD AUTO: 3 % — HIGH (ref 0–1.5)
LYMPHOCYTES # BLD AUTO: 1.44 K/UL — SIGNIFICANT CHANGE UP (ref 1–3.3)
LYMPHOCYTES # BLD AUTO: 8.4 % — LOW (ref 13–44)
MAGNESIUM SERPL-MCNC: 1.9 MG/DL — SIGNIFICANT CHANGE UP (ref 1.6–2.6)
MCHC RBC-ENTMCNC: 28.8 PG — SIGNIFICANT CHANGE UP (ref 27–34)
MCHC RBC-ENTMCNC: 31.1 GM/DL — LOW (ref 32–36)
MCV RBC AUTO: 92.6 FL — SIGNIFICANT CHANGE UP (ref 80–100)
MONOCYTES # BLD AUTO: 1.3 K/UL — HIGH (ref 0–0.9)
MONOCYTES NFR BLD AUTO: 7.6 % — SIGNIFICANT CHANGE UP (ref 2–14)
NEUTROPHILS # BLD AUTO: 13.74 K/UL — HIGH (ref 1.8–7.4)
NEUTROPHILS NFR BLD AUTO: 79.9 % — HIGH (ref 43–77)
NRBC # BLD: 0 /100 WBCS — SIGNIFICANT CHANGE UP (ref 0–0)
ORGANISM # SPEC MICROSCOPIC CNT: SIGNIFICANT CHANGE UP
ORGANISM # SPEC MICROSCOPIC CNT: SIGNIFICANT CHANGE UP
PHOSPHATE SERPL-MCNC: 2.4 MG/DL — LOW (ref 2.5–4.5)
PLATELET # BLD AUTO: 284 K/UL — SIGNIFICANT CHANGE UP (ref 150–400)
POTASSIUM SERPL-MCNC: 4.4 MMOL/L — SIGNIFICANT CHANGE UP (ref 3.5–5.3)
POTASSIUM SERPL-SCNC: 4.4 MMOL/L — SIGNIFICANT CHANGE UP (ref 3.5–5.3)
RBC # BLD: 4.31 M/UL — SIGNIFICANT CHANGE UP (ref 4.2–5.8)
RBC # FLD: 14.5 % — SIGNIFICANT CHANGE UP (ref 10.3–14.5)
SODIUM SERPL-SCNC: 137 MMOL/L — SIGNIFICANT CHANGE UP (ref 135–145)
SPECIMEN SOURCE: SIGNIFICANT CHANGE UP
WBC # BLD: 17.2 K/UL — HIGH (ref 3.8–10.5)
WBC # FLD AUTO: 17.2 K/UL — HIGH (ref 3.8–10.5)

## 2019-04-23 PROCEDURE — 99233 SBSQ HOSP IP/OBS HIGH 50: CPT | Mod: GC

## 2019-04-23 RX ORDER — LOSARTAN POTASSIUM 100 MG/1
100 TABLET, FILM COATED ORAL DAILY
Qty: 0 | Refills: 0 | Status: DISCONTINUED | OUTPATIENT
Start: 2019-04-23 | End: 2019-04-25

## 2019-04-23 RX ADMIN — Medication 100 MILLIGRAM(S): at 07:33

## 2019-04-23 RX ADMIN — Medication 81 MILLIGRAM(S): at 11:38

## 2019-04-23 RX ADMIN — Medication 2000 MILLIGRAM(S): at 22:11

## 2019-04-23 RX ADMIN — AMLODIPINE BESYLATE 5 MILLIGRAM(S): 2.5 TABLET ORAL at 07:31

## 2019-04-23 RX ADMIN — Medication 2000 MILLIGRAM(S): at 16:43

## 2019-04-23 RX ADMIN — Medication 1 DROP(S): at 18:05

## 2019-04-23 RX ADMIN — SENNA PLUS 1 TABLET(S): 8.6 TABLET ORAL at 11:38

## 2019-04-23 RX ADMIN — HEPARIN SODIUM 5000 UNIT(S): 5000 INJECTION INTRAVENOUS; SUBCUTANEOUS at 00:04

## 2019-04-23 RX ADMIN — ATORVASTATIN CALCIUM 20 MILLIGRAM(S): 80 TABLET, FILM COATED ORAL at 00:04

## 2019-04-23 RX ADMIN — HEPARIN SODIUM 5000 UNIT(S): 5000 INJECTION INTRAVENOUS; SUBCUTANEOUS at 13:52

## 2019-04-23 RX ADMIN — HEPARIN SODIUM 5000 UNIT(S): 5000 INJECTION INTRAVENOUS; SUBCUTANEOUS at 22:11

## 2019-04-23 RX ADMIN — Medication 10 MILLIGRAM(S): at 18:34

## 2019-04-23 RX ADMIN — Medication 1 DROP(S): at 07:32

## 2019-04-23 RX ADMIN — SIMETHICONE 80 MILLIGRAM(S): 80 TABLET, CHEWABLE ORAL at 11:38

## 2019-04-23 RX ADMIN — HEPARIN SODIUM 5000 UNIT(S): 5000 INJECTION INTRAVENOUS; SUBCUTANEOUS at 07:32

## 2019-04-23 RX ADMIN — LOSARTAN POTASSIUM 100 MILLIGRAM(S): 100 TABLET, FILM COATED ORAL at 09:08

## 2019-04-23 RX ADMIN — ATORVASTATIN CALCIUM 20 MILLIGRAM(S): 80 TABLET, FILM COATED ORAL at 22:10

## 2019-04-23 RX ADMIN — Medication 2000 MILLIGRAM(S): at 00:04

## 2019-04-23 RX ADMIN — Medication 100 MILLIGRAM(S): at 18:04

## 2019-04-23 RX ADMIN — Medication 2000 MILLIGRAM(S): at 07:30

## 2019-04-23 NOTE — PROGRESS NOTE ADULT - PROBLEM SELECTOR PLAN 3
Persistent tachypnea but does not feel SOB. TTE EF 60%, no vegetation. CXR  Mildly worsened bibasilar hazy opacities and worsening pleural effusion on R. s/p 2 dose lasix 20mg IVP.  - Duonebs PRN  - pulm recs appreciated  - OOB and incentive spirometer  - Wearing nasal cannula but no supplemental O2. Will remove-unclear if patient is wearing for comfort.

## 2019-04-23 NOTE — PROGRESS NOTE ADULT - PROBLEM SELECTOR PLAN 2
No BM yesterday night. Persistent abdominal distention. was previously on bowel regimen and had several loose BMs. Still having multiple BMs-most likely due to  antibiotics.  - Abd x-ray w/ ileus  - Simethicone for gas  - Senna and colace for continued distention. Will add dulcolax for BMs today.

## 2019-04-23 NOTE — PROGRESS NOTE ADULT - PROBLEM SELECTOR PLAN 4
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  -Failed TOV last night and was straight cath-ed for 500cc. 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

## 2019-04-23 NOTE — PROGRESS NOTE ADULT - PROBLEM SELECTOR PLAN 8
Hx of HTN on amlodipine and losartan  -restarted on amlodipine 5mg and losartan 100mg PO.   -uptitrate amlodipine if SBP persistently elevated    #HLD  - Hx of HLD on home Lipitor  - continue home Lipitor 20mg    #sick sinus syndrome  - Hx of SSS s/p PPM, PPM interrogated on presentation and changed into MRI mode to evaluate back.

## 2019-04-23 NOTE — PROGRESS NOTE ADULT - SUBJECTIVE AND OBJECTIVE BOX
SUBJECTIVE / INTERVAL HPI: PO. Patient seen and examined at bedside. Patient complaints remain unchanged. He reports no pain unless he is moving. But remarks he feels confused. When prompted he is able to state he is here because of his back infection. However, unable to answer questions regarding BMs and urination.     VITAL SIGNS:  Vital Signs Last 24 Hrs  T(C): 36.6 (23 Apr 2019 08:47), Max: 37.4 (22 Apr 2019 21:50)  T(F): 97.8 (23 Apr 2019 08:47), Max: 99.3 (22 Apr 2019 21:50)  HR: 76 (23 Apr 2019 08:47) (76 - 91)  BP: 169/98 (23 Apr 2019 08:47) (162/80 - 176/84)  BP(mean): --  RR: 18 (23 Apr 2019 08:47) (18 - 18)  SpO2: 94% (23 Apr 2019 08:47) (92% - 95%)    PHYSICAL EXAM:    General: sitting in bed and slumping to the left and in no acute distress  HEENT: normocephalic, atraumatic, PERRL, anicteric sclera; no conjunctival pallor, mucus membranes moist  Neck: supple, no masses  Cardiovascular: +S1/S2, regular rate and rhythm; no murmurs, no rub, no gallop  Respiratory: (+) slight crackles at R mid lung field-unchanged, no rhonchi, no wheeze, no rales  Gastrointestinal: soft, active bowel sounds, non-tender, (+) distended  Extremities: Warm, dry; no edema, clubbing or cyanosis, (+) increased edema to the R arm. No pain reported.   Vascular: 2+ radial, DP pulses bilaterally  Neurological: AAOx3; no focal deficits. strength: 4/5 BLE-improved RLE strength.     MEDICATIONS:  MEDICATIONS  (STANDING):  amLODIPine   Tablet 5 milliGRAM(s) Oral daily  aspirin  chewable 81 milliGRAM(s) Oral daily  atorvastatin 20 milliGRAM(s) Oral at bedtime  ceFAZolin  Injectable. 2000 milliGRAM(s) IV Push every 8 hours  dextrose 5%. 1000 milliLiter(s) (50 mL/Hr) IV Continuous <Continuous>  dextrose 50% Injectable 12.5 Gram(s) IV Push once  dextrose 50% Injectable 25 Gram(s) IV Push once  dextrose 50% Injectable 25 Gram(s) IV Push once  docusate sodium 100 milliGRAM(s) Oral two times a day  heparin  Injectable 5000 Unit(s) SubCutaneous every 8 hours  insulin lispro (HumaLOG) corrective regimen sliding scale   SubCutaneous Before meals and at bedtime  losartan 100 milliGRAM(s) Oral daily  rifampin 600 milliGRAM(s) Oral daily  senna 1 Tablet(s) Oral daily  simethicone 80 milliGRAM(s) Chew daily  timolol 0.5% Solution 1 Drop(s) Both EYES every 12 hours    MEDICATIONS  (PRN):  acetaminophen   Tablet .. 650 milliGRAM(s) Oral every 6 hours PRN Temp greater or equal to 38C (100.4F), Mild Pain (1 - 3)  ALBUTerol/ipratropium for Nebulization 3 milliLiter(s) Nebulizer every 6 hours PRN Shortness of Breath and/or Wheezing  dextrose 40% Gel 15 Gram(s) Oral once PRN Blood Glucose LESS THAN 70 milliGRAM(s)/deciliter  glucagon  Injectable 1 milliGRAM(s) IntraMuscular once PRN Glucose LESS THAN 70 milligrams/deciliter      ALLERGIES:  Allergies    No Known Allergies    Intolerances        LABS:                        12.4   17.20 )-----------( 284      ( 23 Apr 2019 06:59 )             39.9     04-23    137  |  102  |  19  ----------------------------<  106<H>  4.4   |  23  |  0.45<L>    Ca    8.9      23 Apr 2019 06:59  Phos  2.4     04-23  Mg     1.9     04-23          CAPILLARY BLOOD GLUCOSE      POCT Blood Glucose.: 102 mg/dL (23 Apr 2019 08:21)      RADIOLOGY & ADDITIONAL TESTS: Reviewed.

## 2019-04-23 NOTE — PROGRESS NOTE ADULT - PROBLEM SELECTOR PLAN 1
Initially met severe sepsis criteria on admission T 102F, Leukocytosis 26, reported tachypnea. HR 90s. Lactate elevation to 4.9-> 3.2 -> 1.8. Found to have MSSA bacteremia secondary to discitis and T12/L1 epidural abscess. Continued bacteremia on 4/16 after T102F w/ 1 culture bottle growing gram (+) cocci in clusters.   - ID recs appreciated  - BC 4/20: NGTD. If cultures remain negative for 5 days, able to place PICC  - Continue Rifampin 600 q 24hr( biofilm penetration given hardware) and Cefazolin 2g [id number] until (5/28/19). Will need PICC  -Negative TTE for endocarditis; refusing SUSANA  -Ortho spine recs with no surgical intervention at this time  -improving leukocytosis    ##severe sepsis  - improved. see above for plan

## 2019-04-24 LAB
GLUCOSE BLDC GLUCOMTR-MCNC: 130 MG/DL — HIGH (ref 70–99)
GLUCOSE BLDC GLUCOMTR-MCNC: 133 MG/DL — HIGH (ref 70–99)
GLUCOSE BLDC GLUCOMTR-MCNC: 96 MG/DL — SIGNIFICANT CHANGE UP (ref 70–99)
GLUCOSE BLDC GLUCOMTR-MCNC: 98 MG/DL — SIGNIFICANT CHANGE UP (ref 70–99)

## 2019-04-24 PROCEDURE — 99233 SBSQ HOSP IP/OBS HIGH 50: CPT | Mod: GC

## 2019-04-24 RX ORDER — ATORVASTATIN CALCIUM 80 MG/1
1 TABLET, FILM COATED ORAL
Qty: 0 | Refills: 0 | DISCHARGE
Start: 2019-04-24

## 2019-04-24 RX ORDER — SENNA PLUS 8.6 MG/1
1 TABLET ORAL
Qty: 0 | Refills: 0 | DISCHARGE
Start: 2019-04-24

## 2019-04-24 RX ORDER — IPRATROPIUM/ALBUTEROL SULFATE 18-103MCG
3 AEROSOL WITH ADAPTER (GRAM) INHALATION
Qty: 0 | Refills: 0 | COMMUNITY
Start: 2019-04-24

## 2019-04-24 RX ORDER — LOSARTAN POTASSIUM 100 MG/1
1 TABLET, FILM COATED ORAL
Qty: 0 | Refills: 0 | DISCHARGE
Start: 2019-04-24

## 2019-04-24 RX ORDER — SIMETHICONE 80 MG/1
1 TABLET, CHEWABLE ORAL
Qty: 0 | Refills: 0 | DISCHARGE
Start: 2019-04-24

## 2019-04-24 RX ORDER — CEFAZOLIN SODIUM 1 G
2 VIAL (EA) INJECTION
Qty: 0 | Refills: 0 | DISCHARGE
Start: 2019-04-24

## 2019-04-24 RX ORDER — AMLODIPINE BESYLATE 2.5 MG/1
1 TABLET ORAL
Qty: 0 | Refills: 0 | DISCHARGE
Start: 2019-04-24

## 2019-04-24 RX ORDER — ACETAMINOPHEN 500 MG
2 TABLET ORAL
Qty: 0 | Refills: 0 | DISCHARGE
Start: 2019-04-24

## 2019-04-24 RX ORDER — DOCUSATE SODIUM 100 MG
1 CAPSULE ORAL
Qty: 0 | Refills: 0 | DISCHARGE
Start: 2019-04-24

## 2019-04-24 RX ORDER — CEFAZOLIN SODIUM 1 G
2 VIAL (EA) INJECTION
Qty: 0 | Refills: 0 | COMMUNITY
Start: 2019-04-24

## 2019-04-24 RX ORDER — ASPIRIN/CALCIUM CARB/MAGNESIUM 324 MG
1 TABLET ORAL
Qty: 0 | Refills: 0 | DISCHARGE
Start: 2019-04-24

## 2019-04-24 RX ADMIN — HEPARIN SODIUM 5000 UNIT(S): 5000 INJECTION INTRAVENOUS; SUBCUTANEOUS at 14:31

## 2019-04-24 RX ADMIN — Medication 3 MILLILITER(S): at 22:40

## 2019-04-24 RX ADMIN — Medication 100 MILLIGRAM(S): at 06:55

## 2019-04-24 RX ADMIN — LOSARTAN POTASSIUM 100 MILLIGRAM(S): 100 TABLET, FILM COATED ORAL at 06:55

## 2019-04-24 RX ADMIN — SIMETHICONE 80 MILLIGRAM(S): 80 TABLET, CHEWABLE ORAL at 12:04

## 2019-04-24 RX ADMIN — Medication 100 MILLIGRAM(S): at 18:12

## 2019-04-24 RX ADMIN — AMLODIPINE BESYLATE 5 MILLIGRAM(S): 2.5 TABLET ORAL at 06:55

## 2019-04-24 RX ADMIN — Medication 1 DROP(S): at 18:12

## 2019-04-24 RX ADMIN — ATORVASTATIN CALCIUM 20 MILLIGRAM(S): 80 TABLET, FILM COATED ORAL at 22:40

## 2019-04-24 RX ADMIN — HEPARIN SODIUM 5000 UNIT(S): 5000 INJECTION INTRAVENOUS; SUBCUTANEOUS at 06:55

## 2019-04-24 RX ADMIN — Medication 2000 MILLIGRAM(S): at 06:54

## 2019-04-24 RX ADMIN — HEPARIN SODIUM 5000 UNIT(S): 5000 INJECTION INTRAVENOUS; SUBCUTANEOUS at 22:40

## 2019-04-24 RX ADMIN — SENNA PLUS 1 TABLET(S): 8.6 TABLET ORAL at 12:03

## 2019-04-24 RX ADMIN — Medication 81 MILLIGRAM(S): at 12:03

## 2019-04-24 RX ADMIN — Medication 2000 MILLIGRAM(S): at 14:31

## 2019-04-24 RX ADMIN — Medication 1 DROP(S): at 06:56

## 2019-04-24 NOTE — PROGRESS NOTE ADULT - PROBLEM SELECTOR PLAN 2
Passing gas and large BM yesterday. Persistent abdominal distention. Improves with BMs.   - Abd x-ray w/ ileus  - Simethicone for gas  - Senna and colace for continued distention

## 2019-04-24 NOTE — PROGRESS NOTE ADULT - PROBLEM SELECTOR PLAN 4
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 Retained 1100cc yesterday, dias replaced  -Continue dias.

## 2019-04-24 NOTE — PROGRESS NOTE ADULT - SUBJECTIVE AND OBJECTIVE BOX
SUBJECTIVE / INTERVAL HPI: PO. Patient seen and examined at bedside. Patient without complaints. States he is getting along okay with back pain. Unable to tell me if he's had any BMs.     VITAL SIGNS:  Vital Signs Last 24 Hrs  T(C): 37.1 (24 Apr 2019 05:07), Max: 37.2 (23 Apr 2019 16:20)  T(F): 98.8 (24 Apr 2019 05:07), Max: 98.9 (23 Apr 2019 16:20)  HR: 82 (24 Apr 2019 05:07) (76 - 95)  BP: 147/75 (24 Apr 2019 05:07) (121/75 - 178/89)  BP(mean): --  RR: 18 (23 Apr 2019 22:00) (18 - 18)  SpO2: 92% (24 Apr 2019 05:07) (92% - 95%)    PHYSICAL EXAM:    General: sitting in bed and slumping to the left and in no acute distress  HEENT: normocephalic, atraumatic, PERRL, anicteric sclera; no conjunctival pallor, mucus membranes moist  Neck: supple, no masses  Cardiovascular: +S1/S2, regular rate and rhythm; no murmurs, no rub, no gallop  Respiratory: (+) slight crackles at R mid lung field-unchanged, no rhonchi, no wheeze, no rales  Gastrointestinal: soft, active bowel sounds, non-tender, (+) distended but also improving compared to yesterday  Extremities: Warm, dry; no edema, clubbing or cyanosis, (+) edema to R arm improving compared to yesterday. No pain reported.   Vascular: 2+ radial, DP pulses bilaterally  Neurological: AAOx3; no focal deficits. strength: 4+/5 RLE, 4/5: LLE. improved RLE strength.       MEDICATIONS:  MEDICATIONS  (STANDING):  amLODIPine   Tablet 5 milliGRAM(s) Oral daily  aspirin  chewable 81 milliGRAM(s) Oral daily  atorvastatin 20 milliGRAM(s) Oral at bedtime  ceFAZolin  Injectable. 2000 milliGRAM(s) IV Push every 8 hours  dextrose 5%. 1000 milliLiter(s) (50 mL/Hr) IV Continuous <Continuous>  dextrose 50% Injectable 12.5 Gram(s) IV Push once  dextrose 50% Injectable 25 Gram(s) IV Push once  dextrose 50% Injectable 25 Gram(s) IV Push once  docusate sodium 100 milliGRAM(s) Oral two times a day  heparin  Injectable 5000 Unit(s) SubCutaneous every 8 hours  insulin lispro (HumaLOG) corrective regimen sliding scale   SubCutaneous Before meals and at bedtime  losartan 100 milliGRAM(s) Oral daily  rifampin 600 milliGRAM(s) Oral daily  senna 1 Tablet(s) Oral daily  simethicone 80 milliGRAM(s) Chew daily  timolol 0.5% Solution 1 Drop(s) Both EYES every 12 hours    MEDICATIONS  (PRN):  acetaminophen   Tablet .. 650 milliGRAM(s) Oral every 6 hours PRN Temp greater or equal to 38C (100.4F), Mild Pain (1 - 3)  ALBUTerol/ipratropium for Nebulization 3 milliLiter(s) Nebulizer every 6 hours PRN Shortness of Breath and/or Wheezing  dextrose 40% Gel 15 Gram(s) Oral once PRN Blood Glucose LESS THAN 70 milliGRAM(s)/deciliter  glucagon  Injectable 1 milliGRAM(s) IntraMuscular once PRN Glucose LESS THAN 70 milligrams/deciliter      ALLERGIES:  Allergies    No Known Allergies    Intolerances        LABS:                        12.4   17.20 )-----------( 284      ( 23 Apr 2019 06:59 )             39.9     04-23    137  |  102  |  19  ----------------------------<  106<H>  4.4   |  23  |  0.45<L>    Ca    8.9      23 Apr 2019 06:59  Phos  2.4     04-23  Mg     1.9     04-23          CAPILLARY BLOOD GLUCOSE      POCT Blood Glucose.: 96 mg/dL (24 Apr 2019 08:16)      RADIOLOGY & ADDITIONAL TESTS: Reviewed.

## 2019-04-24 NOTE — PROGRESS NOTE ADULT - PROBLEM SELECTOR PLAN 1
Initially met severe sepsis criteria on admission T 102F, Leukocytosis 26, reported tachypnea. HR 90s. Lactate elevation to 4.9-> 3.2 -> 1.8. Found to have MSSA bacteremia secondary to discitis and T12/L1 epidural abscess. Continued bacteremia on 4/16 after T102F w/ 1 culture bottle growing gram (+) cocci in clusters.   - ID recs appreciated  - BC 4/20: NGTD. If cultures remain negative for 5 days, able to place PICC  - Continue Rifampin 600 q 24hr( biofilm penetration given hardware) and Cefazolin 2g [id number] until (5/28/19). Will need PICC  -Negative TTE for endocarditis; refusing SUSANA  -Ortho spine recs with no surgical intervention at this time  -improving leukocytosis    ##severe sepsis  - improved. see above for plan

## 2019-04-24 NOTE — PROGRESS NOTE ADULT - NSHPATTENDINGPLANDISCUSS_GEN_ALL_CORE
primary team
Dr. Escamilla
primary team
PULMONARY
housestaff
resident team.
hs
resident team and patient.
resident team, pt's friend/partner and patient.
hs
hs

## 2019-04-25 ENCOUNTER — TRANSCRIPTION ENCOUNTER (OUTPATIENT)
Age: 84
End: 2019-04-25

## 2019-04-25 VITALS
TEMPERATURE: 98 F | OXYGEN SATURATION: 97 % | SYSTOLIC BLOOD PRESSURE: 160 MMHG | RESPIRATION RATE: 17 BRPM | HEART RATE: 79 BPM | DIASTOLIC BLOOD PRESSURE: 84 MMHG

## 2019-04-25 LAB
CULTURE RESULTS: SIGNIFICANT CHANGE UP
GLUCOSE BLDC GLUCOMTR-MCNC: 130 MG/DL — HIGH (ref 70–99)
GLUCOSE BLDC GLUCOMTR-MCNC: 99 MG/DL — SIGNIFICANT CHANGE UP (ref 70–99)
SPECIMEN SOURCE: SIGNIFICANT CHANGE UP

## 2019-04-25 PROCEDURE — 74018 RADEX ABDOMEN 1 VIEW: CPT

## 2019-04-25 PROCEDURE — 72149 MRI LUMBAR SPINE W/DYE: CPT

## 2019-04-25 PROCEDURE — 82248 BILIRUBIN DIRECT: CPT

## 2019-04-25 PROCEDURE — 36410 VNPNXR 3YR/> PHY/QHP DX/THER: CPT

## 2019-04-25 PROCEDURE — 86140 C-REACTIVE PROTEIN: CPT

## 2019-04-25 PROCEDURE — 83605 ASSAY OF LACTIC ACID: CPT

## 2019-04-25 PROCEDURE — 87040 BLOOD CULTURE FOR BACTERIA: CPT

## 2019-04-25 PROCEDURE — 93306 TTE W/DOPPLER COMPLETE: CPT

## 2019-04-25 PROCEDURE — 72131 CT LUMBAR SPINE W/O DYE: CPT

## 2019-04-25 PROCEDURE — 96368 THER/DIAG CONCURRENT INF: CPT

## 2019-04-25 PROCEDURE — 87086 URINE CULTURE/COLONY COUNT: CPT

## 2019-04-25 PROCEDURE — A9556: CPT

## 2019-04-25 PROCEDURE — 85652 RBC SED RATE AUTOMATED: CPT

## 2019-04-25 PROCEDURE — 82553 CREATINE MB FRACTION: CPT

## 2019-04-25 PROCEDURE — 97530 THERAPEUTIC ACTIVITIES: CPT

## 2019-04-25 PROCEDURE — 99285 EMERGENCY DEPT VISIT HI MDM: CPT | Mod: 25

## 2019-04-25 PROCEDURE — 84295 ASSAY OF SERUM SODIUM: CPT

## 2019-04-25 PROCEDURE — 87186 SC STD MICRODIL/AGAR DIL: CPT

## 2019-04-25 PROCEDURE — 36415 COLL VENOUS BLD VENIPUNCTURE: CPT

## 2019-04-25 PROCEDURE — 99239 HOSP IP/OBS DSCHRG MGMT >30: CPT

## 2019-04-25 PROCEDURE — 96365 THER/PROPH/DIAG IV INF INIT: CPT | Mod: XU

## 2019-04-25 PROCEDURE — 72147 MRI CHEST SPINE W/DYE: CPT

## 2019-04-25 PROCEDURE — 80076 HEPATIC FUNCTION PANEL: CPT

## 2019-04-25 PROCEDURE — 83735 ASSAY OF MAGNESIUM: CPT

## 2019-04-25 PROCEDURE — 93005 ELECTROCARDIOGRAM TRACING: CPT | Mod: XU

## 2019-04-25 PROCEDURE — 82962 GLUCOSE BLOOD TEST: CPT

## 2019-04-25 PROCEDURE — 85027 COMPLETE CBC AUTOMATED: CPT

## 2019-04-25 PROCEDURE — 81001 URINALYSIS AUTO W/SCOPE: CPT

## 2019-04-25 PROCEDURE — 82550 ASSAY OF CK (CPK): CPT

## 2019-04-25 PROCEDURE — 76937 US GUIDE VASCULAR ACCESS: CPT

## 2019-04-25 PROCEDURE — 78802 RP LOCLZJ TUM WHBDY 1 D IMG: CPT

## 2019-04-25 PROCEDURE — 87150 DNA/RNA AMPLIFIED PROBE: CPT

## 2019-04-25 PROCEDURE — 96375 TX/PRO/DX INJ NEW DRUG ADDON: CPT | Mod: XU

## 2019-04-25 PROCEDURE — 80053 COMPREHEN METABOLIC PANEL: CPT

## 2019-04-25 PROCEDURE — 72142 MRI NECK SPINE W/DYE: CPT

## 2019-04-25 PROCEDURE — 84443 ASSAY THYROID STIM HORMONE: CPT

## 2019-04-25 PROCEDURE — 85610 PROTHROMBIN TIME: CPT

## 2019-04-25 PROCEDURE — 72132 CT LUMBAR SPINE W/DYE: CPT

## 2019-04-25 PROCEDURE — 85025 COMPLETE CBC W/AUTO DIFF WBC: CPT

## 2019-04-25 PROCEDURE — 80074 ACUTE HEPATITIS PANEL: CPT

## 2019-04-25 PROCEDURE — A9585: CPT

## 2019-04-25 PROCEDURE — 70450 CT HEAD/BRAIN W/O DYE: CPT

## 2019-04-25 PROCEDURE — 84132 ASSAY OF SERUM POTASSIUM: CPT

## 2019-04-25 PROCEDURE — 84100 ASSAY OF PHOSPHORUS: CPT

## 2019-04-25 PROCEDURE — 71045 X-RAY EXAM CHEST 1 VIEW: CPT

## 2019-04-25 PROCEDURE — 85730 THROMBOPLASTIN TIME PARTIAL: CPT

## 2019-04-25 PROCEDURE — 74177 CT ABD & PELVIS W/CONTRAST: CPT

## 2019-04-25 PROCEDURE — 97161 PT EVAL LOW COMPLEX 20 MIN: CPT

## 2019-04-25 PROCEDURE — 51702 INSERT TEMP BLADDER CATH: CPT

## 2019-04-25 PROCEDURE — 83880 ASSAY OF NATRIURETIC PEPTIDE: CPT

## 2019-04-25 PROCEDURE — 36569 INSJ PICC 5 YR+ W/O IMAGING: CPT

## 2019-04-25 PROCEDURE — 80307 DRUG TEST PRSMV CHEM ANLYZR: CPT

## 2019-04-25 PROCEDURE — 80048 BASIC METABOLIC PNL TOTAL CA: CPT

## 2019-04-25 PROCEDURE — 82330 ASSAY OF CALCIUM: CPT

## 2019-04-25 PROCEDURE — 82607 VITAMIN B-12: CPT

## 2019-04-25 PROCEDURE — 84484 ASSAY OF TROPONIN QUANT: CPT

## 2019-04-25 PROCEDURE — 82803 BLOOD GASES ANY COMBINATION: CPT

## 2019-04-25 PROCEDURE — 94640 AIRWAY INHALATION TREATMENT: CPT

## 2019-04-25 PROCEDURE — 83036 HEMOGLOBIN GLYCOSYLATED A1C: CPT

## 2019-04-25 RX ORDER — ATORVASTATIN CALCIUM 80 MG/1
1 TABLET, FILM COATED ORAL
Qty: 0 | Refills: 0 | COMMUNITY

## 2019-04-25 RX ORDER — AMLODIPINE BESYLATE 2.5 MG/1
1 TABLET ORAL
Qty: 0 | Refills: 0 | COMMUNITY

## 2019-04-25 RX ORDER — LOSARTAN POTASSIUM 100 MG/1
1 TABLET, FILM COATED ORAL
Qty: 0 | Refills: 0 | COMMUNITY

## 2019-04-25 RX ORDER — CHLORHEXIDINE GLUCONATE 213 G/1000ML
1 SOLUTION TOPICAL
Qty: 0 | Refills: 0 | Status: DISCONTINUED | OUTPATIENT
Start: 2019-04-25 | End: 2019-04-25

## 2019-04-25 RX ORDER — ASPIRIN/CALCIUM CARB/MAGNESIUM 324 MG
1 TABLET ORAL
Qty: 0 | Refills: 0 | COMMUNITY

## 2019-04-25 RX ORDER — SODIUM CHLORIDE 9 MG/ML
10 INJECTION INTRAMUSCULAR; INTRAVENOUS; SUBCUTANEOUS
Qty: 0 | Refills: 0 | Status: DISCONTINUED | OUTPATIENT
Start: 2019-04-25 | End: 2019-04-25

## 2019-04-25 RX ADMIN — Medication 81 MILLIGRAM(S): at 11:35

## 2019-04-25 RX ADMIN — SENNA PLUS 1 TABLET(S): 8.6 TABLET ORAL at 11:35

## 2019-04-25 RX ADMIN — Medication 2000 MILLIGRAM(S): at 16:15

## 2019-04-25 RX ADMIN — LOSARTAN POTASSIUM 100 MILLIGRAM(S): 100 TABLET, FILM COATED ORAL at 06:27

## 2019-04-25 RX ADMIN — AMLODIPINE BESYLATE 5 MILLIGRAM(S): 2.5 TABLET ORAL at 06:27

## 2019-04-25 RX ADMIN — HEPARIN SODIUM 5000 UNIT(S): 5000 INJECTION INTRAVENOUS; SUBCUTANEOUS at 06:27

## 2019-04-25 RX ADMIN — SIMETHICONE 80 MILLIGRAM(S): 80 TABLET, CHEWABLE ORAL at 11:36

## 2019-04-25 RX ADMIN — Medication 2000 MILLIGRAM(S): at 00:39

## 2019-04-25 RX ADMIN — Medication 1 DROP(S): at 06:27

## 2019-04-25 RX ADMIN — Medication 2000 MILLIGRAM(S): at 07:24

## 2019-04-25 NOTE — PROGRESS NOTE ADULT - PROBLEM SELECTOR PROBLEM 3
Tachypnea
Epidural abscess
Rhabdomyolysis
Rhabdomyolysis
Tachypnea
Epidural abscess
Infectious encephalopathy
Rhabdomyolysis
Tachypnea
Epidural abscess
Rhabdomyolysis
Rhabdomyolysis

## 2019-04-25 NOTE — PROGRESS NOTE ADULT - SUBJECTIVE AND OBJECTIVE BOX
SUBJECTIVE / INTERVAL HPI: PO. Patient seen and examined at bedside. Patient without current complaints. unable to note BMs. Asking, "what do I do now?"    VITAL SIGNS:  Vital Signs Last 24 Hrs  T(C): 37.1 (25 Apr 2019 05:26), Max: 37.1 (25 Apr 2019 05:26)  T(F): 98.7 (25 Apr 2019 05:26), Max: 98.7 (25 Apr 2019 05:26)  HR: 79 (25 Apr 2019 05:26) (78 - 88)  BP: 146/67 (25 Apr 2019 05:26) (146/67 - 158/77)  BP(mean): --  RR: 18 (25 Apr 2019 05:26) (18 - 18)  SpO2: 97% (25 Apr 2019 05:26) (92% - 97%)    PHYSICAL EXAM:    General: sitting in bed and slumping to the right and in no acute distress  HEENT: normocephalic, atraumatic, PERRL, anicteric sclera; no conjunctival pallor, mucus membranes moist  Neck: supple, no masses  Cardiovascular: +S1/S2, regular rate and rhythm; no murmurs, no rub, no gallop  Respiratory: (+) slight crackles at R mid lung field-unchanged, no rhonchi, no wheeze, no rales  Gastrointestinal: soft, active bowel sounds, non-tender, (+) distended  Extremities: Warm, dry; no edema, clubbing or cyanosis,  Vascular: 2+ radial, DP pulses bilaterally  Neurological: AAOx3; no focal deficits. strength: 4+/5 RLE, 4/5: LLE    MEDICATIONS:  MEDICATIONS  (STANDING):  amLODIPine   Tablet 5 milliGRAM(s) Oral daily  aspirin  chewable 81 milliGRAM(s) Oral daily  atorvastatin 20 milliGRAM(s) Oral at bedtime  ceFAZolin  Injectable. 2000 milliGRAM(s) IV Push every 8 hours  dextrose 5%. 1000 milliLiter(s) (50 mL/Hr) IV Continuous <Continuous>  dextrose 50% Injectable 12.5 Gram(s) IV Push once  dextrose 50% Injectable 25 Gram(s) IV Push once  dextrose 50% Injectable 25 Gram(s) IV Push once  docusate sodium 100 milliGRAM(s) Oral two times a day  heparin  Injectable 5000 Unit(s) SubCutaneous every 8 hours  insulin lispro (HumaLOG) corrective regimen sliding scale   SubCutaneous Before meals and at bedtime  losartan 100 milliGRAM(s) Oral daily  rifampin 600 milliGRAM(s) Oral daily  senna 1 Tablet(s) Oral daily  simethicone 80 milliGRAM(s) Chew daily  timolol 0.5% Solution 1 Drop(s) Both EYES every 12 hours    MEDICATIONS  (PRN):  acetaminophen   Tablet .. 650 milliGRAM(s) Oral every 6 hours PRN Temp greater or equal to 38C (100.4F), Mild Pain (1 - 3)  ALBUTerol/ipratropium for Nebulization 3 milliLiter(s) Nebulizer every 6 hours PRN Shortness of Breath and/or Wheezing  dextrose 40% Gel 15 Gram(s) Oral once PRN Blood Glucose LESS THAN 70 milliGRAM(s)/deciliter  glucagon  Injectable 1 milliGRAM(s) IntraMuscular once PRN Glucose LESS THAN 70 milligrams/deciliter      ALLERGIES:  Allergies    No Known Allergies    Intolerances        LABS:              CAPILLARY BLOOD GLUCOSE      POCT Blood Glucose.: 99 mg/dL (25 Apr 2019 08:00)      RADIOLOGY & ADDITIONAL TESTS: Reviewed.

## 2019-04-25 NOTE — CONSULT NOTE ADULT - REASON FOR ADMISSION
Weakness, Altered mental status
AMS, B/L LE weakness
Weakness, Altered mental status

## 2019-04-25 NOTE — PROGRESS NOTE ADULT - PROBLEM SELECTOR PROBLEM 8
Sick sinus syndrome
Essential hypertension
Sick sinus syndrome
Essential hypertension
Sick sinus syndrome
Sick sinus syndrome
Essential hypertension
Sick sinus syndrome
Sick sinus syndrome

## 2019-04-25 NOTE — PROGRESS NOTE ADULT - PROVIDER SPECIALTY LIST ADULT
Hospitalist
Infectious Disease
Internal Medicine
Neurology
Neurology
Orthopedics
Orthopedics
Pulmonology
Pulmonology
Rehab Medicine
Rehab Medicine
Pulmonology
Internal Medicine
Internal Medicine

## 2019-04-25 NOTE — PROGRESS NOTE ADULT - PROBLEM SELECTOR PROBLEM 9
Nutrition, metabolism, and development symptoms
Nutrition, metabolism, and development symptoms
Need for prophylactic measure
Nutrition, metabolism, and development symptoms

## 2019-04-25 NOTE — PROGRESS NOTE ADULT - PROBLEM SELECTOR PLAN 1
Initially met severe sepsis criteria on admission T 102F, Leukocytosis 26, reported tachypnea. HR 90s. Lactate elevation to 4.9-> 3.2 -> 1.8. Found to have MSSA bacteremia secondary to discitis and T12/L1 epidural abscess. Continued bacteremia on 4/16 after T102F w/ 1 culture bottle growing gram (+) cocci in clusters.   - ID recs appreciated  - BC 4/20: NGTD. If cultures remain negative for 5 days, able to place PICC. Day 5 today.   - Continue Rifampin 600 q 24hr( biofilm penetration given hardware) and Cefazolin 2g [id number] until at least 5/28/19. Will need PICC and ID f/u to determine duration as patient refusing SUSANA  -Negative TTE for endocarditis; refusing SUSANA  -Ortho spine recs with no surgical intervention at this time  -improving leukocytosis    ##severe sepsis  - improved. see above for plan

## 2019-04-25 NOTE — PROCEDURE NOTE - NSINFORMCONSENT_GEN_A_CORE
Benefits, risks, and possible complications of procedure explained to patient/caregiver who verbalized understanding and gave verbal consent.
by patient's hcp Mir/Benefits, risks, and possible complications of procedure explained to patient/caregiver who verbalized understanding and gave written consent.

## 2019-04-25 NOTE — PROGRESS NOTE ADULT - PROBLEM SELECTOR PLAN 6
Noted back pain on presentation. Initial CT concerning for findings of developing osteo or discitis. MRI with small epidural abscess and discitis. past surgical hx of posterior spinal fusion from L4 to S1 and prior L2-S1 laminectomy, found to have disc bulging T12 L1 on MRI likely source of pain  - F/u Ortho-spine - no intervention at this time  - see abo
Hx of HTN on amlodipine and losartan  - Hold in setting of sepsis  - Plan to restart as BP tolerates
Hx of HTN on amlodipine and losartan  - Hold in setting of sepsis  - Plan to restart as BP tolerates
Noted back pain on presentation. Initial CT concerning for findings of developing osteo or discitis. MRI with small epidural abscess and discitis. past surgical hx of posterior spinal fusion from L4 to S1 and prior L2-S1 laminectomy, found to have disc bulging T12 L1 on MRI likely source of pain  - f/u Ortho-spine outpatient as no intervention at this time  - see above
Hx of HTN on amlodipine and losartan  - Hold in setting of sepsis  - Plan to restart as BP tolerates
Noted back pain on presentation. Initial CT concerning for findings of developing osteo or discitis. MRI with small epidural abscess and discitis. past surgical hx of posterior spinal fusion from L4 to S1 and prior L2-S1 laminectomy, found to have disc bulging T12 L1 on MRI likely source of pain  - f/u Ortho-spine outpatient as no intervention at this time  - see above
Hx of HTN on amlodipine and losartan  - Hold in setting of sepsis  - Plan to restart as BP tolerates
Hx of HTN on amlodipine and losartan  - Hold in setting of sepsis  - Plan to restart as BP tolerates

## 2019-04-25 NOTE — PROGRESS NOTE ADULT - PROBLEM SELECTOR PROBLEM 4
Metabolic encephalopathy
Metabolic encephalopathy
Transaminitis
Transaminitis
Metabolic encephalopathy
Rhabdomyolysis
Transaminitis

## 2019-04-25 NOTE — PROGRESS NOTE ADULT - ASSESSMENT
Briefly, this is a 89yo gentleman with a PMH of mild dementia, SSS s/p PPM, HTN, bilateral glaucoma and pyogenic granuloma of eyelid s/p surgery who p/w BLE weakness, back pain and infectious encephalopathy i/s/o severe sepsis 2/2 MSSA bacteremia c/b epidural abscess and T12-L1 discitis. Pending clearance of bacteremia

## 2019-04-25 NOTE — PROCEDURE NOTE - NSPROCDETAILS_GEN_ALL_CORE
location identified, draped/prepped, sterile technique used/supine position/ultrasound guidance/ultrasound assessment/sterile dressing applied/sterile technique, catheter placed

## 2019-04-25 NOTE — PROGRESS NOTE ADULT - REASON FOR ADMISSION
Weakness, Altered mental status

## 2019-04-25 NOTE — PROGRESS NOTE ADULT - PROBLEM SELECTOR PLAN 5
Resolved. Has not been walking much per patient. CK at 7744 downtrending on AM labs  will continue to monitor fluid status    #transaminitis   Resolved. Initially presented w/ transaminitis likely in setting of rhabdomyolysis
Noted back pain on presentation. Initial CT concerning for findings of developing osteo or discitis. MRI with small epidural abscess and discitis. past surgical hx of posterior spinal fusion from L4 to S1 and prior L2-S1 laminectomy, found to have disc bulging T12 L1 on MRI likely source of pain  - F/u Ortho-spine - no intervention at this time  - Neuro recs
Noted back pain on presentation. Initial CT concerning for findings of developing osteo or discitis. MRI with small epidural abscess and discitis. past surgical hx of posterior spinal fusion from L4 to S1 and prior L2-S1 laminectomy, found to have disc bulging T12 L1 on MRI likely source of pain  - F/u Ortho-spine - no intervention at this time  - Neuro recs
Resolved. Has not been walking much per patient. CK at 7744 downtrending on AM labs.  - will continue to monitor fluid status    #transaminitis   - Resolved. Initially presented w/ transaminitis likely in setting of rhabdomyolysis
Noted back pain on presentation. Initial CT concerning for findings of developing osteo or discitis. MRI with small epidural abscess and discitis. past surgical hx of posterior spinal fusion from L4 to S1 and prior L2-S1 laminectomy, found to have disc bulging T12 L1 on MRI likely source of pain  - F/u Ortho-spine - no intervention at this time  - Neuro recs
Resolved. Has not been walking much per patient. CK at 7744 downtrending on AM labs.  - will continue to monitor fluid status    #transaminitis   - Resolved. Initially presented w/ transaminitis likely in setting of rhabdomyolysis
Noted back pain on presentation. Initial CT concerning for findings of developing osteo or discitis. MRI with small epidural abscess and discitis. past surgical hx of posterior spinal fusion from L4 to S1 and prior L2-S1 laminectomy, found to have disc bulging T12 L1 on MRI likely source of pain  - F/u Ortho-spine - no intervention at this time  - Neuro recs
Noted back pain on presentation. Initial CT concerning for findings of developing osteo or discitis. MRI with small epidural abscess and discitis. past surgical hx of posterior spinal fusion from L4 to S1 and prior L2-S1 laminectomy, found to have disc bulging T12 L1 on MRI likely source of pain  - F/u Ortho-spine - no intervention at this time  - Neuro recs

## 2019-04-25 NOTE — PROGRESS NOTE ADULT - PROBLEM SELECTOR PROBLEM 5
Rhabdomyolysis
Back pain
Back pain
Rhabdomyolysis
Back pain
Rhabdomyolysis
Transaminitis
Back pain
Back pain

## 2019-04-25 NOTE — PROGRESS NOTE ADULT - PROBLEM SELECTOR PROBLEM 1
Severe sepsis
Tachypnea
MSSA (methicillin susceptible Staphylococcus aureus) infection
Severe sepsis
Severe sepsis
Tachypnea
Severe sepsis
Tachypnea
Severe sepsis
MSSA (methicillin susceptible Staphylococcus aureus) infection
Severe sepsis

## 2019-04-25 NOTE — PROGRESS NOTE ADULT - PROBLEM SELECTOR PLAN 9
F: s/p 2L NS bolus   E: Replete electrolytes to maintain K>4 and Mg>2  N: DASH/TLC
F: none   E: Replete electrolytes to maintain K>4 and Mg>2  N: DASH/TLC
VTE: No HSQ pending potential biopsy    DNR/DNI (MOLST in chart)    Dispo: Admit to [facility] for severe sepsis for Discitis, rhabdomyolysis
F: none   E: Replete electrolytes to maintain K>4 and Mg>2  N: DASH/TLC

## 2019-04-25 NOTE — DISCHARGE NOTE NURSING/CASE MANAGEMENT/SOCIAL WORK - NSDCDPATPORTLINK_GEN_ALL_CORE
You can access the [facility] Patient Portal, offered by [facility], by registering with the following website: [email or web address]

## 2019-04-25 NOTE — PROGRESS NOTE ADULT - PROBLEM SELECTOR PROBLEM 7
Spinal abscess
HLD (hyperlipidemia)
Spinal abscess
HLD (hyperlipidemia)
Spinal abscess
HLD (hyperlipidemia)
HLD (hyperlipidemia)

## 2019-04-25 NOTE — CONSULT NOTE ADULT - SUBJECTIVE AND OBJECTIVE BOX
Vascular Access Service Consult Note    89yMaleHEALTH ISSUES - PROBLEM Dx:  Abdominal distention: Abdominal distention  Spinal abscess: Spinal abscess  Discitis: Discitis  Diarrhea: Diarrhea  MSSA (methicillin susceptible Staphylococcus aureus) infection: You were found to have bacteria in your blood stream due to the abscess in your back. You were treated with IV antibiotics and you will continue on IV antibiotic (Cefazolin) until May 28th. You are also on Rifampin (another antibiotic) because you have a pacemaker. Please follow up with Dr. Ann (the infectious disease doctor) as scheduled.  Epidural abscess: Epidural abscess  Pleural effusion: Pleural effusion  Tachypnea: Tachypnea  Pneumonia  Pneumonia  Pneumonia  Pneumonia  Infectious encephalopathy: Infectious encephalopathy  Need for prophylactic measure: Need for prophylactic measure  Nutrition, metabolism, and development symptoms: Nutrition, metabolism, and development symptoms  Sick sinus syndrome: Consistent with your past medical history, please continue to take your medications as prescribed and continue to follow up with your Cardiologist for continued managment of this condition  HLD (hyperlipidemia): Consistent with your past medical history, please continue to take your medications as prescribed and continue to follow up with your primary care physician for continued managment of this condition  Essential hypertension: Consistent with your past medical history, please continue to take your medications as prescribed and continue to follow up with your primary care physician for continued managment of this condition  Back pain: Back pain  Transaminitis: Transaminitis  Rhabdomyolysis: Rhabdomyolysis  Metabolic encephalopathy: Metabolic encephalopathy  Severe sepsis: Severe sepsis             Diagnosis: pna    Indications for Vascular Access (Check all that apply)  [ x ]  Antibiotic Therapy       Antibiotic Prescribed:   cefazolin until 5/28           [  ]  IV Hydration  [  ]  Total Parenteral Nutrition  [  ]  Chemotherapy  [  ]  Difficult Venous Access  [  ]  CVP monitoring  [  ]  Medications with high potential for tissue necrosis on extravasation  [  ]  Other    Screening (Check all that apply)  Previous Radiation to chest  [  ] Yes      [ x ]  No  Breast Cancer                          [  ] Left     [  ]  Right    [ x ]  No  Lymph Node Dissection         [  ] Left     [  ]  Right    [x  ]  No  Pacemaker or ICD                   [ x ] Left     [  ]  Right    [  ]  No  Upper Extremity DVT             [  ] Left     [  ]  Right    [  ]  No  Chronic Kidney Disease         [  ]  Yes     [ x ]  No  Hemodialysis                           [  ]  Yes     [x  ]  No  AV Fistula/ Graft                     [  ]  Left    [  ]  Right    [x  ]  No  Temp>101F in past 24 H       [  ]  Yes     [ x ]  No  H/O PICC/Midline                   [  ]  Yes     [ x ]  No    Lab data:        blood cultures 4/20 ngtd            I have reviewed the chart, interviewed and examined the patient and determined that this patient:  [  ] Is a candidate for a PICC line  [x  ] Is a candidate for a Midline  [  ] Is not a candidate for vascular access device (reason)    Lumens:    [x  ] Single  [  ] Double

## 2019-04-25 NOTE — PROGRESS NOTE ADULT - PROBLEM SELECTOR PLAN 4
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 Not on formulary and patient family has not brought to the hospital.     ##urinary retention  Failed TOV x2 again. Retained 1100cc yesterday, dias replaced  -Continue dias.

## 2019-04-25 NOTE — CONSULT NOTE ADULT - CONSULT REASON
Rehab evaluation
Respiratory failure, Sepsis
SAB, discitis
Tachypnea
back pain/weakness
evaluation for vascular access
weakness

## 2019-05-01 ENCOUNTER — APPOINTMENT (OUTPATIENT)
Dept: UROLOGY | Facility: CLINIC | Age: 84
End: 2019-05-01

## 2019-05-01 DIAGNOSIS — H40.9 UNSPECIFIED GLAUCOMA: ICD-10-CM

## 2019-05-01 DIAGNOSIS — I49.5 SICK SINUS SYNDROME: ICD-10-CM

## 2019-05-01 DIAGNOSIS — R65.20 SEVERE SEPSIS WITHOUT SEPTIC SHOCK: ICD-10-CM

## 2019-05-01 DIAGNOSIS — M62.82 RHABDOMYOLYSIS: ICD-10-CM

## 2019-05-01 DIAGNOSIS — B95.61 METHICILLIN SUSCEPTIBLE STAPHYLOCOCCUS AUREUS INFECTION AS THE CAUSE OF DISEASES CLASSIFIED ELSEWHERE: ICD-10-CM

## 2019-05-01 DIAGNOSIS — M46.45 DISCITIS, UNSPECIFIED, THORACOLUMBAR REGION: ICD-10-CM

## 2019-05-01 DIAGNOSIS — R50.9 FEVER, UNSPECIFIED: ICD-10-CM

## 2019-05-01 DIAGNOSIS — A41.9 SEPSIS, UNSPECIFIED ORGANISM: ICD-10-CM

## 2019-05-01 DIAGNOSIS — G93.41 METABOLIC ENCEPHALOPATHY: ICD-10-CM

## 2019-05-07 ENCOUNTER — INPATIENT (INPATIENT)
Facility: HOSPITAL | Age: 84
LOS: 2 days | Discharge: NURSING, REHAB OR LONG-TERM CARE | DRG: 843 | End: 2019-05-10
Attending: INTERNAL MEDICINE | Admitting: INTERNAL MEDICINE
Payer: MEDICARE

## 2019-05-07 VITALS
RESPIRATION RATE: 20 BRPM | HEART RATE: 90 BPM | TEMPERATURE: 99 F | DIASTOLIC BLOOD PRESSURE: 60 MMHG | OXYGEN SATURATION: 95 % | SYSTOLIC BLOOD PRESSURE: 96 MMHG

## 2019-05-07 DIAGNOSIS — M46.40 DISCITIS, UNSPECIFIED, SITE UNSPECIFIED: ICD-10-CM

## 2019-05-07 DIAGNOSIS — E46 UNSPECIFIED PROTEIN-CALORIE MALNUTRITION: ICD-10-CM

## 2019-05-07 DIAGNOSIS — E78.5 HYPERLIPIDEMIA, UNSPECIFIED: ICD-10-CM

## 2019-05-07 DIAGNOSIS — Z98.890 OTHER SPECIFIED POSTPROCEDURAL STATES: Chronic | ICD-10-CM

## 2019-05-07 DIAGNOSIS — E87.1 HYPO-OSMOLALITY AND HYPONATREMIA: ICD-10-CM

## 2019-05-07 DIAGNOSIS — Z98.1 ARTHRODESIS STATUS: Chronic | ICD-10-CM

## 2019-05-07 DIAGNOSIS — I10 ESSENTIAL (PRIMARY) HYPERTENSION: ICD-10-CM

## 2019-05-07 DIAGNOSIS — R63.8 OTHER SYMPTOMS AND SIGNS CONCERNING FOOD AND FLUID INTAKE: ICD-10-CM

## 2019-05-07 DIAGNOSIS — E87.79 OTHER FLUID OVERLOAD: ICD-10-CM

## 2019-05-07 DIAGNOSIS — H40.9 UNSPECIFIED GLAUCOMA: ICD-10-CM

## 2019-05-07 DIAGNOSIS — G30.9 ALZHEIMER'S DISEASE, UNSPECIFIED: ICD-10-CM

## 2019-05-07 LAB
ALBUMIN SERPL ELPH-MCNC: 2 G/DL — LOW (ref 3.3–5)
ALP SERPL-CCNC: 107 U/L — SIGNIFICANT CHANGE UP (ref 40–120)
ALT FLD-CCNC: 9 U/L — LOW (ref 10–45)
ANION GAP SERPL CALC-SCNC: 5 MMOL/L — SIGNIFICANT CHANGE UP (ref 5–17)
APPEARANCE UR: ABNORMAL
AST SERPL-CCNC: 24 U/L — SIGNIFICANT CHANGE UP (ref 10–40)
BILIRUB SERPL-MCNC: 0.3 MG/DL — SIGNIFICANT CHANGE UP (ref 0.2–1.2)
BILIRUB UR-MCNC: NEGATIVE — SIGNIFICANT CHANGE UP
BUN SERPL-MCNC: 12 MG/DL — SIGNIFICANT CHANGE UP (ref 7–23)
CALCIUM SERPL-MCNC: 8.2 MG/DL — LOW (ref 8.4–10.5)
CALCIUM UR-MCNC: 10.2 MG/DL — SIGNIFICANT CHANGE UP
CHLORIDE SERPL-SCNC: 90 MMOL/L — LOW (ref 96–108)
CHLORIDE UR-SCNC: 71 MMOL/L — SIGNIFICANT CHANGE UP
CO2 SERPL-SCNC: 33 MMOL/L — HIGH (ref 22–31)
COLOR SPEC: ABNORMAL
CREAT SERPL-MCNC: 0.48 MG/DL — LOW (ref 0.5–1.3)
DIFF PNL FLD: ABNORMAL
GLUCOSE SERPL-MCNC: 101 MG/DL — HIGH (ref 70–99)
GLUCOSE UR QL: NEGATIVE — SIGNIFICANT CHANGE UP
KETONES UR-MCNC: ABNORMAL MG/DL
LEUKOCYTE ESTERASE UR-ACNC: ABNORMAL
NITRITE UR-MCNC: NEGATIVE — SIGNIFICANT CHANGE UP
OSMOLALITY UR: 371 MOSMOL/KG — SIGNIFICANT CHANGE UP (ref 100–650)
PH UR: 6 — SIGNIFICANT CHANGE UP (ref 5–8)
PHOSPHATE 24H UR-MCNC: 29.3 MG/DL — SIGNIFICANT CHANGE UP
POTASSIUM SERPL-MCNC: 5 MMOL/L — SIGNIFICANT CHANGE UP (ref 3.5–5.3)
POTASSIUM SERPL-SCNC: 5 MMOL/L — SIGNIFICANT CHANGE UP (ref 3.5–5.3)
POTASSIUM UR-SCNC: 56 MMOL/L — SIGNIFICANT CHANGE UP
PROT SERPL-MCNC: 5.7 G/DL — LOW (ref 6–8.3)
PROT UR-MCNC: ABNORMAL MG/DL
SODIUM SERPL-SCNC: 128 MMOL/L — LOW (ref 135–145)
SP GR SPEC: 1.01 — SIGNIFICANT CHANGE UP (ref 1–1.03)
UROBILINOGEN FLD QL: 1 E.U./DL — SIGNIFICANT CHANGE UP

## 2019-05-07 PROCEDURE — 93010 ELECTROCARDIOGRAM REPORT: CPT

## 2019-05-07 PROCEDURE — 71045 X-RAY EXAM CHEST 1 VIEW: CPT | Mod: 26

## 2019-05-07 PROCEDURE — 99285 EMERGENCY DEPT VISIT HI MDM: CPT | Mod: 25

## 2019-05-07 RX ORDER — TIMOLOL 0.5 %
1 DROPS OPHTHALMIC (EYE)
Qty: 0 | Refills: 0 | COMMUNITY

## 2019-05-07 RX ORDER — ALBUMIN HUMAN 25 %
50 VIAL (ML) INTRAVENOUS ONCE
Qty: 0 | Refills: 0 | Status: COMPLETED | OUTPATIENT
Start: 2019-05-07 | End: 2019-05-07

## 2019-05-07 RX ORDER — FUROSEMIDE 40 MG
40 TABLET ORAL ONCE
Qty: 0 | Refills: 0 | Status: COMPLETED | OUTPATIENT
Start: 2019-05-07 | End: 2019-05-07

## 2019-05-07 RX ORDER — ASPIRIN/CALCIUM CARB/MAGNESIUM 324 MG
81 TABLET ORAL DAILY
Qty: 0 | Refills: 0 | Status: DISCONTINUED | OUTPATIENT
Start: 2019-05-07 | End: 2019-05-09

## 2019-05-07 RX ORDER — CEFAZOLIN SODIUM 1 G
2000 VIAL (EA) INJECTION EVERY 8 HOURS
Qty: 0 | Refills: 0 | Status: DISCONTINUED | OUTPATIENT
Start: 2019-05-07 | End: 2019-05-09

## 2019-05-07 RX ORDER — DOCUSATE SODIUM 100 MG
100 CAPSULE ORAL
Qty: 0 | Refills: 0 | Status: DISCONTINUED | OUTPATIENT
Start: 2019-05-07 | End: 2019-05-09

## 2019-05-07 RX ORDER — ACETAMINOPHEN 500 MG
650 TABLET ORAL EVERY 6 HOURS
Qty: 0 | Refills: 0 | Status: DISCONTINUED | OUTPATIENT
Start: 2019-05-07 | End: 2019-05-07

## 2019-05-07 RX ORDER — SIMETHICONE 80 MG/1
80 TABLET, CHEWABLE ORAL DAILY
Qty: 0 | Refills: 0 | Status: DISCONTINUED | OUTPATIENT
Start: 2019-05-07 | End: 2019-05-09

## 2019-05-07 RX ORDER — SENNA PLUS 8.6 MG/1
1 TABLET ORAL DAILY
Qty: 0 | Refills: 0 | Status: DISCONTINUED | OUTPATIENT
Start: 2019-05-07 | End: 2019-05-09

## 2019-05-07 RX ORDER — ACETAMINOPHEN 500 MG
650 TABLET ORAL EVERY 6 HOURS
Qty: 0 | Refills: 0 | Status: DISCONTINUED | OUTPATIENT
Start: 2019-05-07 | End: 2019-05-09

## 2019-05-07 RX ORDER — LOSARTAN POTASSIUM 100 MG/1
100 TABLET, FILM COATED ORAL DAILY
Qty: 0 | Refills: 0 | Status: DISCONTINUED | OUTPATIENT
Start: 2019-05-07 | End: 2019-05-09

## 2019-05-07 RX ORDER — IPRATROPIUM/ALBUTEROL SULFATE 18-103MCG
3 AEROSOL WITH ADAPTER (GRAM) INHALATION EVERY 6 HOURS
Qty: 0 | Refills: 0 | Status: DISCONTINUED | OUTPATIENT
Start: 2019-05-07 | End: 2019-05-10

## 2019-05-07 RX ORDER — ATORVASTATIN CALCIUM 80 MG/1
20 TABLET, FILM COATED ORAL AT BEDTIME
Qty: 0 | Refills: 0 | Status: DISCONTINUED | OUTPATIENT
Start: 2019-05-07 | End: 2019-05-09

## 2019-05-07 RX ORDER — CEFAZOLIN SODIUM 1 G
2000 VIAL (EA) INJECTION EVERY 8 HOURS
Qty: 0 | Refills: 0 | Status: DISCONTINUED | OUTPATIENT
Start: 2019-05-07 | End: 2019-05-07

## 2019-05-07 RX ORDER — TIMOLOL 0.5 %
1 DROPS OPHTHALMIC (EYE)
Qty: 0 | Refills: 0 | Status: DISCONTINUED | OUTPATIENT
Start: 2019-05-07 | End: 2019-05-10

## 2019-05-07 RX ADMIN — Medication 1 DROP(S): at 22:29

## 2019-05-07 RX ADMIN — Medication 50 MILLILITER(S): at 22:29

## 2019-05-07 RX ADMIN — Medication 2000 MILLIGRAM(S): at 22:29

## 2019-05-07 RX ADMIN — Medication 40 MILLIGRAM(S): at 21:32

## 2019-05-07 NOTE — ED PROVIDER NOTE - CLINICAL SUMMARY MEDICAL DECISION MAKING FREE TEXT BOX
Patient in ED with concern for possible CHF exacerbation which is later relayed to me by family at bedside.  Patient appears well otherwise.  CXR w evidence of volume overload.  GIven lasix and will plan for admission.  I spoke w cardio fellow who is in agreement.  Pt ready for admission.

## 2019-05-07 NOTE — ED ADULT NURSE NOTE - PMH
Dementia    Essential hypertension    Glaucoma    HLD (hyperlipidemia)    RBBB (right bundle branch block with left anterior fascicular block)

## 2019-05-07 NOTE — ED PROVIDER NOTE - CONSTITUTIONAL, MLM
normal... Well appearing, well nourished, awake, alert, oriented to person, and in no apparent distress

## 2019-05-07 NOTE — H&P ADULT - PROBLEM SELECTOR PLAN 8
F: No IVF  E: Replete PRN  N: DASH/TLC + Ensure TID  P: HSQ  C: DNR/DNI  D: Admit to [facility] for management of fluid overload status -Continue Atorvastatin

## 2019-05-07 NOTE — H&P ADULT - ASSESSMENT
89M who presents with fluid overload status. Given that patient's previous echocardiogram showed normal function and he has hypoalbuminemia likelihood of CHF exacerbation is low. Most likely this is from decreased oncotic pressure. Patient has poor nutritional intake as per family friend at bedside but unclear if that would be only cause of hypoalbuminemia. Other liver synthetic function appears normal and no protein is observed in urine. Patient should be admitted for diuresis (+/- albumin) for optimization.

## 2019-05-07 NOTE — H&P ADULT - PROBLEM SELECTOR PLAN 4
-Hold Norvasc  -Continue losartan 100mg w/ MSSA bacteremia on previous admission  -Continue Cefazolin 2g q8h End 5/28/19, may be extended by Dr. Ann  -Continue Rifampin 600mg Qd End 5/28/19, may be extended by Dr. Ann  -Alert ID of patient readmission in AM

## 2019-05-07 NOTE — ED ADULT NURSE NOTE - OBJECTIVE STATEMENT
Pt. is DNR/DNI, sent from NH for "worsening CHF and hyponatremia," per NH papers. Pt. had Na+ 129 from labs yesterday. Pt. is A&Ox2, denies any pain or discomfort. Pt. on 4L O2 NC at NH and in ED. RUE 18g midline noted. +2 pitting edema of b/l hands and R foot, no edema of L foot. 16F dias draining dark yellow urine. Pt. wearing diaper from NH, blanchable sacral redness, sacral skin intact.

## 2019-05-07 NOTE — ED ADULT NURSE NOTE - INTERVENTIONS DEFINITIONS
Monitor for mental status changes and reorient to person, place, and time/Stretcher in lowest position, wheels locked, appropriate side rails in place/Instruct patient to call for assistance

## 2019-05-07 NOTE — ED PROVIDER NOTE - OBJECTIVE STATEMENT
89 year old male with history of HTN, HLD, RBBB, Glaucoma presents to ED with concern for hyponatremia and exacerbation of underlying CHF as per nursing home documentation.  Patient is awake and alert on ED arrival, though a poor historian and unable to provide much of his own history.  On questioning, patient denies any acute complaints or concerns and seems slightly confused throughout review of systems.  Additional history is limited.

## 2019-05-07 NOTE — ED PROVIDER NOTE - CARE PLAN
Principal Discharge DX:	Hyponatremia  Secondary Diagnosis:	Encounter for general medical examination Principal Discharge DX:	Acute congestive heart failure, unspecified heart failure type  Secondary Diagnosis:	Hyponatremia

## 2019-05-07 NOTE — H&P ADULT - ATTENDING COMMENTS
See resident note written above, for details. I reviewed the documentation.  I reviewed vitals, labs, medications, cardiac studies and additional imaging 5/8/19.  I agree with the resident's findings and plans as written above with the following additions/amendments:    89WM DNR/DNI with Dementia, SSS s/p PPM, HTN, and recent admission for MSSA bacteremia 2/2 discitis on Cefazolin and Rifampin who presents with fluid overload status in context of hypoalbuminemia. Patient with acute aspiration episode in ED with food particles and liquid suctioned from oropharynx. Patient placed on HFNC. HCP at bedside noted to comment on how he had desired for patient to pass at home instead of in the hospital. ICU consult called, patient stabilized for floor with continued discussion of goals of care.   Plan for:  Lasix 40mg IV BID   HFNC, avoid BIPAP if still suctioning food particles given risk of worsening aspiration/impaction  Coreg 6.25 BID  Holding amlodipine d/t peripheral edema  Obtain TTE for structural assessment  Strict I/O, use handheld urinal for output measurement  Daily STANDING weights to be recorded and tracked  ACE wrap lower extremities  Dietician/Nutrition consultation for CHF/cardiac diet reinforcement  Palliative care consultation to assist with further clarification of goals of care, DNR/DNI MOLST in EMR   KAN Branch.  Cardiology Attending See resident note written above, for details. I reviewed the documentation.  I reviewed vitals, labs, medications, cardiac studies and additional imaging 5/8/19.  I agree with the resident's findings and plans as written above with the following additions/amendments:    89WM DNR/DNI with Dementia, SSS s/p PPM, HTN, and recent admission for MSSA bacteremia 2/2 discitis on Cefazolin and Rifampin who presents with fluid overload status in context of hypoalbuminemia. Patient with acute aspiration episode in ED with food particles and liquid suctioned from oropharynx. Patient placed on HFNC. HCP at bedside noted to comment on how he had desired for patient to pass at home instead of in the hospital. ICU consult called, patient stabilized for floor with continued discussion of goals of care.   Plan for:  Lasix 40mg IV BID   HFNC, avoid BIPAP if still suctioning food particles given risk of worsening aspiration/impaction  Coreg 6.25 BID  Holding amlodipine d/t peripheral edema  Obtain TTE for structural assessment  Strict I/O, use handheld urinal for output measurement  Daily STANDING weights to be recorded and tracked  ACE wrap lower extremities  Dietician/Nutrition consultation  Palliative care consultation to assist with further clarification of goals of care, DNR/DNI MOLST in EMR   KAN Branch.  Cardiology Attending

## 2019-05-07 NOTE — H&P ADULT - NSICDXPASTMEDICALHX_GEN_ALL_CORE_FT
PAST MEDICAL HISTORY:  Dementia     Essential hypertension     Glaucoma     HLD (hyperlipidemia)     RBBB (right bundle branch block with left anterior fascicular block)

## 2019-05-07 NOTE — ED PROVIDER NOTE - NS ED MD DISPO SPECIAL CONSIDERATION1
PACU ANESTHESIA ADMISSION NOTE      Procedure: Exploratory laparotomy for bowel obstruction  Lysis of adhesions for small bowel obstruction    Post op diagnosis:  Small bowel obstruction due to adhesions      ____  Intubated  TV:______       Rate: ______      FiO2: ______    __x__  Patent Airway    _x___  Full return of protective reflexes    ___x_  Full recovery from anesthesia / back to baseline status    Vitals:  T(C): 37.2 (02-18-18 @ 21:08), Max: 37.2 (02-18-18 @ 20:33)  HR: 106 (02-18-18 @ 21:08) (85 - 107)  BP: 177/86 (02-18-18 @ 21:08) (143/70 - 179/84)  RR: 17 (02-18-18 @ 21:08) (17 - 20)  SpO2: 98% (02-18-18 @ 21:08) (95% - 98%)    Mental Status:  __x__ Awake   ____x_ Alert   _____ Drowsy   _____ Sedated    Nausea/Vomiting:  __x__ NO  ______Yes,   See Post - Op Orders          Pain Scale (0-10):  ___5__    Treatment: ____ None    ___x_ See Post - Op/PCA Orders    Post - Operative Fluids:   ____ Oral   __x__ See Post - Op Orders    Plan: Discharge:   ____Home       __x___Floor     _____Critical Care    _____  Other:_________________    Comments: None

## 2019-05-07 NOTE — H&P ADULT - PROBLEM SELECTOR PLAN 6
-On outpatient stimulant Galantamine  -Med Rec?  -If patient to continue here >24h will need formulary exchange will discuss with pharmacy in AM -Continue Timolol drops

## 2019-05-07 NOTE — H&P ADULT - PROBLEM SELECTOR PLAN 9
F: No IVF  E: Replete PRN  N: DASH/TLC + Ensure TID  P: HSQ  C: DNR/DNI  D: Admit to [facility] for management of fluid overload status

## 2019-05-07 NOTE — ED ADULT TRIAGE NOTE - CHIEF COMPLAINT QUOTE
Patient BIBA from NH for low sodium level 129 , also c/o sob . History of CHF . Denies any chest pain .

## 2019-05-07 NOTE — H&P ADULT - PROBLEM SELECTOR PLAN 7
-Continue Atorvastatin -On outpatient stimulant Galantamine  -Med Rec?  -If patient to continue here >24h will need formulary exchange will discuss with pharmacy in AM

## 2019-05-07 NOTE — H&P ADULT - NSHPPHYSICALEXAM_GEN_ALL_CORE
VITALS  Vital Signs Last 24 Hrs  T(C): 37.3 (07 May 2019 15:53), Max: 37.3 (07 May 2019 15:53)  T(F): 99.2 (07 May 2019 15:53), Max: 99.2 (07 May 2019 15:53)  HR: 92 (07 May 2019 18:48) (90 - 92)  BP: 135/85 (07 May 2019 18:48) (96/60 - 135/85)  BP(mean): --  RR: 20 (07 May 2019 18:48) (20 - 20)  SpO2: 96% (07 May 2019 18:48) (95% - 96%)    I&O's Summary      CAPILLARY BLOOD GLUCOSE      PHYSICAL EXAM  General: A&Ox1; lying in bed sleeping, drool on chin and chest, difficult to arouse to participate in exam but responds to voice and follows commands  Head: NC/AT; PERRL; EOMI; anicteric sclera  Neck: Supple; no JVD  Respiratory: Bibasilar crackles  Cardiovascular: Regular rhythm/rate; S1/S2; no gallops or murmurs auscultated  Gastrointestinal: Soft; NTND w/out rebound tenderness or guarding; bowel sounds normal  Extremities: Upper extremities with +1 pitting edema and non-pitting edema. Lower extremity with +2 pitting edema in thigh, calves with trace edema  Neurological:  CNII-XII grossly intact; no obvious focal deficits

## 2019-05-07 NOTE — H&P ADULT - PROBLEM SELECTOR PLAN 1
-Echocardiogram in AM  -Continue Diuresis prn with Lasix and Albumin  -Gave 50cc 25% Albumin following dose of lasix given in ED to help mobilize extravascular fluid  -Likely the cause of hyponatremia, will continue to monitor sodium level  -Will hold Norvasc in setting of diffuse edema, unlikely to be cause but could be contributing  -Will need good BP control, Sys<130mmHg to aid in keeping hydrostatic pressures down

## 2019-05-07 NOTE — H&P ADULT - PROBLEM SELECTOR PROBLEM 7
HLD (hyperlipidemia) Alzheimer's dementia without behavioral disturbance, unspecified timing of dementia onset

## 2019-05-07 NOTE — H&P ADULT - HISTORY OF PRESENT ILLNESS
89M with SSS s/p PPM, HTN, Glaucoma in both eyes (multiple surgeries at [facility]), pyogenic granuloma of eyelid s/p surgery, dementia, recent admission with MSSA bacteremia 2/2 discitis who presents from [facility] with increasing work of breathing and fluid overload. Patient is not able to provide much history, history obtained from chart. Patient has been getting progressively more hypoxic at , O2 was started and increased to 4LNC there. Work up there concerning for fluid overload so patient was sent to [facility] ED.    He currently denies any complaints.     In the ED T99.2, HR90, BP96/60, RR20, 95% on 4LNC.. He was given Lasix 40 IVP.

## 2019-05-08 LAB
ALBUMIN SERPL ELPH-MCNC: 2.2 G/DL — LOW (ref 3.3–5)
ALBUMIN SERPL ELPH-MCNC: 2.3 G/DL — LOW (ref 3.3–5)
ALP SERPL-CCNC: 100 U/L — SIGNIFICANT CHANGE UP (ref 40–120)
ALP SERPL-CCNC: 161 U/L — HIGH (ref 40–120)
ALT FLD-CCNC: 11 U/L — SIGNIFICANT CHANGE UP (ref 10–45)
ALT FLD-CCNC: 7 U/L — LOW (ref 10–45)
ANION GAP SERPL CALC-SCNC: 7 MMOL/L — SIGNIFICANT CHANGE UP (ref 5–17)
ANION GAP SERPL CALC-SCNC: 7 MMOL/L — SIGNIFICANT CHANGE UP (ref 5–17)
AST SERPL-CCNC: 20 U/L — SIGNIFICANT CHANGE UP (ref 10–40)
AST SERPL-CCNC: 29 U/L — SIGNIFICANT CHANGE UP (ref 10–40)
BILIRUB SERPL-MCNC: 0.3 MG/DL — SIGNIFICANT CHANGE UP (ref 0.2–1.2)
BILIRUB SERPL-MCNC: 0.5 MG/DL — SIGNIFICANT CHANGE UP (ref 0.2–1.2)
BUN SERPL-MCNC: 12 MG/DL — SIGNIFICANT CHANGE UP (ref 7–23)
BUN SERPL-MCNC: 16 MG/DL — SIGNIFICANT CHANGE UP (ref 7–23)
CALCIUM SERPL-MCNC: 8.2 MG/DL — LOW (ref 8.4–10.5)
CALCIUM SERPL-MCNC: 8.3 MG/DL — LOW (ref 8.4–10.5)
CHLORIDE SERPL-SCNC: 90 MMOL/L — LOW (ref 96–108)
CHLORIDE SERPL-SCNC: 90 MMOL/L — LOW (ref 96–108)
CO2 SERPL-SCNC: 35 MMOL/L — HIGH (ref 22–31)
CO2 SERPL-SCNC: 35 MMOL/L — HIGH (ref 22–31)
CREAT SERPL-MCNC: 0.45 MG/DL — LOW (ref 0.5–1.3)
CREAT SERPL-MCNC: 0.65 MG/DL — SIGNIFICANT CHANGE UP (ref 0.5–1.3)
CULTURE RESULTS: NO GROWTH — SIGNIFICANT CHANGE UP
GLUCOSE SERPL-MCNC: 127 MG/DL — HIGH (ref 70–99)
GLUCOSE SERPL-MCNC: 91 MG/DL — SIGNIFICANT CHANGE UP (ref 70–99)
HCT VFR BLD CALC: 36.2 % — LOW (ref 39–50)
HGB BLD-MCNC: 11.4 G/DL — LOW (ref 13–17)
MAGNESIUM SERPL-MCNC: 1.9 MG/DL — SIGNIFICANT CHANGE UP (ref 1.6–2.6)
MAGNESIUM UR-MCNC: 12.8 MG/DL — SIGNIFICANT CHANGE UP
MCHC RBC-ENTMCNC: 29.5 PG — SIGNIFICANT CHANGE UP (ref 27–34)
MCHC RBC-ENTMCNC: 31.5 GM/DL — LOW (ref 32–36)
MCV RBC AUTO: 93.5 FL — SIGNIFICANT CHANGE UP (ref 80–100)
NRBC # BLD: 0 /100 WBCS — SIGNIFICANT CHANGE UP (ref 0–0)
OSMOLALITY UR: 342 MOSMOL/KG — SIGNIFICANT CHANGE UP (ref 100–650)
PLATELET # BLD AUTO: 268 K/UL — SIGNIFICANT CHANGE UP (ref 150–400)
POTASSIUM SERPL-MCNC: 4.5 MMOL/L — SIGNIFICANT CHANGE UP (ref 3.5–5.3)
POTASSIUM SERPL-MCNC: 4.8 MMOL/L — SIGNIFICANT CHANGE UP (ref 3.5–5.3)
POTASSIUM SERPL-SCNC: 4.5 MMOL/L — SIGNIFICANT CHANGE UP (ref 3.5–5.3)
POTASSIUM SERPL-SCNC: 4.8 MMOL/L — SIGNIFICANT CHANGE UP (ref 3.5–5.3)
PROT SERPL-MCNC: 5.6 G/DL — LOW (ref 6–8.3)
PROT SERPL-MCNC: 5.8 G/DL — LOW (ref 6–8.3)
RBC # BLD: 3.87 M/UL — LOW (ref 4.2–5.8)
RBC # FLD: 13.8 % — SIGNIFICANT CHANGE UP (ref 10.3–14.5)
SODIUM SERPL-SCNC: 132 MMOL/L — LOW (ref 135–145)
SODIUM SERPL-SCNC: 132 MMOL/L — LOW (ref 135–145)
SPECIMEN SOURCE: SIGNIFICANT CHANGE UP
WBC # BLD: 9.21 K/UL — SIGNIFICANT CHANGE UP (ref 3.8–10.5)
WBC # FLD AUTO: 9.21 K/UL — SIGNIFICANT CHANGE UP (ref 3.8–10.5)

## 2019-05-08 PROCEDURE — 99223 1ST HOSP IP/OBS HIGH 75: CPT

## 2019-05-08 PROCEDURE — 71045 X-RAY EXAM CHEST 1 VIEW: CPT | Mod: 26

## 2019-05-08 RX ORDER — HEPARIN SODIUM 5000 [USP'U]/ML
5000 INJECTION INTRAVENOUS; SUBCUTANEOUS EVERY 8 HOURS
Qty: 0 | Refills: 0 | Status: DISCONTINUED | OUTPATIENT
Start: 2019-05-08 | End: 2019-05-09

## 2019-05-08 RX ORDER — ALBUMIN HUMAN 25 %
50 VIAL (ML) INTRAVENOUS ONCE
Qty: 0 | Refills: 0 | Status: COMPLETED | OUTPATIENT
Start: 2019-05-08 | End: 2019-05-08

## 2019-05-08 RX ORDER — MAGNESIUM SULFATE 500 MG/ML
1 VIAL (ML) INJECTION ONCE
Qty: 0 | Refills: 0 | Status: COMPLETED | OUTPATIENT
Start: 2019-05-08 | End: 2019-05-08

## 2019-05-08 RX ORDER — IPRATROPIUM/ALBUTEROL SULFATE 18-103MCG
3 AEROSOL WITH ADAPTER (GRAM) INHALATION ONCE
Qty: 0 | Refills: 0 | Status: COMPLETED | OUTPATIENT
Start: 2019-05-08 | End: 2019-05-08

## 2019-05-08 RX ORDER — FUROSEMIDE 40 MG
40 TABLET ORAL ONCE
Qty: 0 | Refills: 0 | Status: COMPLETED | OUTPATIENT
Start: 2019-05-08 | End: 2019-05-08

## 2019-05-08 RX ORDER — FUROSEMIDE 40 MG
60 TABLET ORAL EVERY 12 HOURS
Qty: 0 | Refills: 0 | Status: DISCONTINUED | OUTPATIENT
Start: 2019-05-08 | End: 2019-05-10

## 2019-05-08 RX ORDER — DONEPEZIL HYDROCHLORIDE 10 MG/1
10 TABLET, FILM COATED ORAL AT BEDTIME
Qty: 0 | Refills: 0 | Status: DISCONTINUED | OUTPATIENT
Start: 2019-05-08 | End: 2019-05-09

## 2019-05-08 RX ORDER — FUROSEMIDE 40 MG
40 TABLET ORAL EVERY 12 HOURS
Qty: 0 | Refills: 0 | Status: DISCONTINUED | OUTPATIENT
Start: 2019-05-08 | End: 2019-05-08

## 2019-05-08 RX ORDER — ALBUMIN HUMAN 25 %
50 VIAL (ML) INTRAVENOUS ONCE
Qty: 0 | Refills: 0 | Status: DISCONTINUED | OUTPATIENT
Start: 2019-05-08 | End: 2019-05-08

## 2019-05-08 RX ADMIN — Medication 2000 MILLIGRAM(S): at 14:54

## 2019-05-08 RX ADMIN — HEPARIN SODIUM 5000 UNIT(S): 5000 INJECTION INTRAVENOUS; SUBCUTANEOUS at 14:54

## 2019-05-08 RX ADMIN — Medication 1 DROP(S): at 06:42

## 2019-05-08 RX ADMIN — Medication 100 MILLIGRAM(S): at 06:42

## 2019-05-08 RX ADMIN — SIMETHICONE 80 MILLIGRAM(S): 80 TABLET, CHEWABLE ORAL at 12:00

## 2019-05-08 RX ADMIN — Medication 50 MILLILITER(S): at 21:41

## 2019-05-08 RX ADMIN — Medication 100 GRAM(S): at 09:23

## 2019-05-08 RX ADMIN — Medication 2000 MILLIGRAM(S): at 06:42

## 2019-05-08 RX ADMIN — HEPARIN SODIUM 5000 UNIT(S): 5000 INJECTION INTRAVENOUS; SUBCUTANEOUS at 21:03

## 2019-05-08 RX ADMIN — LOSARTAN POTASSIUM 100 MILLIGRAM(S): 100 TABLET, FILM COATED ORAL at 06:42

## 2019-05-08 RX ADMIN — Medication 81 MILLIGRAM(S): at 12:00

## 2019-05-08 RX ADMIN — Medication 2000 MILLIGRAM(S): at 21:02

## 2019-05-08 RX ADMIN — Medication 40 MILLIGRAM(S): at 09:22

## 2019-05-08 RX ADMIN — Medication 3 MILLILITER(S): at 15:15

## 2019-05-08 RX ADMIN — Medication 60 MILLIGRAM(S): at 21:04

## 2019-05-08 RX ADMIN — Medication 50 MILLILITER(S): at 09:23

## 2019-05-08 RX ADMIN — Medication 1 DROP(S): at 17:00

## 2019-05-08 NOTE — PROGRESS NOTE ADULT - PROBLEM SELECTOR PLAN 4
w/ MSSA bacteremia on previous admission  -Continue Cefazolin 2g q8h End 5/28/19, may be extended by Dr. Ann  -Continue Rifampin 600mg Qd End 5/28/19, may be extended by Dr. Ann  -Dr. Ann aware, appreciate recs

## 2019-05-08 NOTE — CONSULT NOTE ADULT - SUBJECTIVE AND OBJECTIVE BOX
HPI:  89M with SSS s/p PPM, HTN, Glaucoma in both eyes (multiple surgeries at [facility]), pyogenic granuloma of eyelid s/p surgery, dementia, recent admission with MSSA bacteremia 2/2 discitis who presents from [facility] with increasing work of breathing and fluid overload. Patient is not able to provide much history, history obtained from chart. Patient has been getting progressively more hypoxic at , O2 was started and increased to 4LNC there. Work up there concerning for fluid overload so patient was sent to [facility] ED.    He currently denies any complaints.     In the ED T99.2, HR90, BP96/60, RR20, 95% on 4LNC.. He was given Lasix 40 IVP. (07 May 2019 22:30)      PAST MEDICAL & SURGICAL HISTORY:  Glaucoma  Dementia  RBBB (right bundle branch block with left anterior fascicular block)  HLD (hyperlipidemia)  Essential hypertension  S/P lumbar spinal fusion  H/O laminectomy        REVIEW OF SYSTEMS:    General:	 no weakness; no fevers, no chills  Skin/Breast: no rash  Respiratory and Thorax: +SOB, no cough  Cardiovascular:	No chest pain  Gastrointestinal:	 no nausea, vomiting , diarrhea  Genitourinary:	no dysuria, no difficulty urinating, no hematuria  Musculoskeletal:	no weakness, no joint swelling/pain  Neurological:	no focal weakness/numbness  Endocrine:	no polyuria, no polydipsia      ANTIBIOTICS:  MEDICATIONS  (STANDING):  albumin human 25% IVPB 50 milliLiter(s) IV Intermittent once  aspirin  chewable 81 milliGRAM(s) Oral daily  atorvastatin 20 milliGRAM(s) Oral at bedtime  ceFAZolin  Injectable. 2000 milliGRAM(s) IV Push every 8 hours  docusate sodium 100 milliGRAM(s) Oral two times a day  donepezil 10 milliGRAM(s) Oral at bedtime  furosemide   Injectable 40 milliGRAM(s) IV Push every 12 hours  heparin  Injectable 5000 Unit(s) SubCutaneous every 8 hours  losartan 100 milliGRAM(s) Oral daily  rifampin 600 milliGRAM(s) Oral daily  senna 1 Tablet(s) Oral daily  simethicone 80 milliGRAM(s) Chew daily  timolol 0.5% Solution 1 Drop(s) Both EYES two times a day    MEDICATIONS  (PRN):  acetaminophen   Tablet .. 650 milliGRAM(s) Oral every 6 hours PRN Temp greater or equal to 38C (100.4F), Moderate Pain (4 - 6)  ALBUTerol/ipratropium for Nebulization 3 milliLiter(s) Nebulizer every 6 hours PRN Shortness of Breath and/or Wheezing      Allergies    No Known Allergies    Intolerances        SOCIAL HISTORY:    FAMILY HISTORY:      Vital Signs Last 24 Hrs  T(C): 37.3 (08 May 2019 04:03), Max: 37.3 (07 May 2019 15:53)  T(F): 99.1 (08 May 2019 04:03), Max: 99.2 (07 May 2019 15:53)  HR: 79 (08 May 2019 09:12) (79 - 92)  BP: 141/65 (08 May 2019 09:12) (96/60 - 147/81)  BP(mean): --  RR: 16 (08 May 2019 09:12) (16 - 20)  SpO2: 98% (08 May 2019 09:12) (94% - 98%)    PHYSICAL EXAM:  Constitutional:Well-developed, well nourished  Eyes:ODESSA, EOMI  Ear/Nose/Throat: no oral lesion, no sinus tenderness on percussion	  Neck:no JVD, no lymphadenopathy, supple  Respiratory: CTA ebla  Cardiovascular: S1S2 RRR, no murmurs  Gastrointestinal:soft, (+) BS, no HSM  Extremities:no e/e/c; RUE PICC site c/d/i  Vascular: DP Pulse:	right normal; left normal            LABS:                        11.4   9.21  )-----------( 268      ( 08 May 2019 05:40 )             36.2     05-08    132<L>  |  90<L>  |  12  ----------------------------<  91  4.8   |  35<H>  |  0.45<L>    Ca    8.3<L>      08 May 2019 05:40  Mg     1.9     05-08    TPro  5.8<L>  /  Alb  2.2<L>  /  TBili  0.3  /  DBili  x   /  AST  20  /  ALT  7<L>  /  AlkPhos  100  05-08      Urinalysis Basic - ( 07 May 2019 17:08 )    Color: x / Appearance: x / SG: x / pH: x  Gluc: x / Ketone: x  / Bili: x / Urobili: x   Blood: x / Protein: x / Nitrite: x   Leuk Esterase: x / RBC: > 10 /HPF / WBC 5-10 /HPF   Sq Epi: x / Non Sq Epi: 5-10 /HPF / Bacteria: Present /HPF        MICROBIOLOGY: reviewed  RADIOLOGY & ADDITIONAL STUDIES: reviewed

## 2019-05-08 NOTE — CHART NOTE - NSCHARTNOTEFT_GEN_A_CORE
Around 2pm MD called to bedside because pt's health care proxy (Mir Selby) heard the pt gurgling after he ate. Nurses entered the room and noted pt had increased work of breathing and desaturation to 70s. Pt was suctioned with visible food materials and liquid removed. Pt was put on non rebreather. MD saw pt at bedside. He appeared diaphoretic with increased work of breathing, /90s. Pt was afebrile at the time. CXR showed no significant change from previous. Pt was escalated from non-breather Around 2pm MD called to bedside because pt's health care proxy (Mir Selby) heard the pt gurgling after he ate. Nurses entered the room and noted pt had increased work of breathing and desaturation to 70s. Pt was suctioned with visible food materials and liquid removed. Pt was put on non rebreather. MD saw pt at bedside. He appeared diaphoretic with increased work of breathing, /90s. Pt was afebrile at the time. CXR showed no significant change from previous. Pt was escalated from non-breather to high flow nasal cannula. ICU consult called.     Vital Signs Last 12 Hrs  T(F): 99.1 (05-08-19 @ 04:03), Max: 99.1 (05-08-19 @ 04:03)  HR: 97 (05-08-19 @ 15:06) (79 - 102)  BP: 164/75 (05-08-19 @ 14:53) (137/67 - 192/91)  BP(mean): --  RR: 20 (05-08-19 @ 15:06) (16 - 25)  SpO2: 96% (05-08-19 @ 15:06) (95% - 98%)    VITAL SIGNS:  T(C): 37.3 (05-08-19 @ 04:03), Max: 37.3 (05-07-19 @ 15:53)  T(F): 99.1 (05-08-19 @ 04:03), Max: 99.2 (05-07-19 @ 15:53)  HR: 97 (05-08-19 @ 15:06) (79 - 102)  BP: 164/75 (05-08-19 @ 14:53) (96/60 - 192/91)  BP(mean): --  RR: 20 (05-08-19 @ 15:06) (16 - 25)  SpO2: 96% (05-08-19 @ 15:06) (94% - 98%)  Wt(kg): --    PHYSICAL EXAM:    Constitutional: Elderly male in respiratory distress, diaphoretic  Eyes: PERRL, EOMI, anicteric sclera  ENT: no nasal discharge; uvula midline, no oropharyngeal erythema or exudates; MMM  Neck: supple; no JVD or thyromegaly  Respiratory: Tachypneic, decreased breath sounds, wheezing in upper lung fields, accessory muscle use  Cardiac: +S1/S2; tachycardic, no M/R/G; PMI non-displaced  Gastrointestinal: abdomen soft, NT/ND; no rebound or guarding; +BSx4  Genitourinary: normal external genitalia  Back: spine midline, no bony tenderness or step-offs; no CVAT B/L  Extremities: WWP, no clubbing or cyanosis; 2+ peripheral edema b/l lower extremities  Musculoskeletal: NROM x4; no joint swelling, tenderness or erythema  Vascular: 2+ radial, femoral, DP/PT pulses B/L  Dermatologic: skin warm, dry and intact; no rashes, wounds, or scars  Neurologic: AAOx1; CNII-XII grossly intact; no focal deficits    LABS:                         11.4   9.21  )-----------( 268      ( 08 May 2019 05:40 )             36.2     05-08    132<L>  |  90<L>  |  12  ----------------------------<  91  4.8   |  35<H>  |  0.45<L>    Ca    8.3<L>      08 May 2019 05:40  Mg     1.9     05-08    TPro  5.8<L>  /  Alb  2.2<L>  /  TBili  0.3  /  DBili  x   /  AST  20  /  ALT  7<L>  /  AlkPhos  100  05-08      Urinalysis Basic - ( 07 May 2019 17:08 )    Color: x / Appearance: x / SG: x / pH: x  Gluc: x / Ketone: x  / Bili: x / Urobili: x   Blood: x / Protein: x / Nitrite: x   Leuk Esterase: x / RBC: > 10 /HPF / WBC 5-10 /HPF   Sq Epi: x / Non Sq Epi: 5-10 /HPF / Bacteria: Present /HPF      CARDIAC MARKERS ( 07 May 2019 16:38 )  x     / 0.01 ng/mL / x     / x     / x        A/P:    89M who presents with fluid overload status w/ aspiration event after eating.    #Aspiration  Pt was suctioned at bedside with removal of food material and liquid. Pt was placed on high flow nasal cannula. Discussion with health care proxy at bedside confirmed pt's goals of care.  - consider escalation to BiPAP if pt continues to exhibit increased work of breathing  - pt may develop fever in response to pneumonitis, but consider unasyn to cover anaerobes if pt spikes  - palliative care consult in AM  - NPO pending improvement in respiratory status FROM/no deformity, pain or tenderness. no restriction of movement

## 2019-05-08 NOTE — CONSULT NOTE ADULT - SUBJECTIVE AND OBJECTIVE BOX
HPI:  89M with SSS s/p PPM, dementia, recent admission with MSSA bacteremia 2/2 discitis who presents from [facility] with increasing work of breathing and fluid overload. Patient is not able to provide much history, history obtained from chart. Patient has been getting progressively more hypoxic at , O2 was started and increased to 4LNC there. This morning patient had witnessed aspiration event and developed progressively worsening shortness of breath and respiratory distress. Discussed with health care proxy and confirmed with MOLST form that patient is DNR/DNI.     PAST MEDICAL & SURGICAL HISTORY:  Glaucoma  Dementia  RBBB (right bundle branch block with left anterior fascicular block)  HLD (hyperlipidemia)  Essential hypertension  S/P lumbar spinal fusion  H/O laminectomy    MEDICATIONS:  MEDICATIONS  (STANDING):  albumin human 25% IVPB 50 milliLiter(s) IV Intermittent once  aspirin  chewable 81 milliGRAM(s) Oral daily  atorvastatin 20 milliGRAM(s) Oral at bedtime  ceFAZolin  Injectable. 2000 milliGRAM(s) IV Push every 8 hours  docusate sodium 100 milliGRAM(s) Oral two times a day  donepezil 10 milliGRAM(s) Oral at bedtime  furosemide   Injectable 60 milliGRAM(s) IV Push every 12 hours  heparin  Injectable 5000 Unit(s) SubCutaneous every 8 hours  losartan 100 milliGRAM(s) Oral daily  rifampin 600 milliGRAM(s) Oral daily  senna 1 Tablet(s) Oral daily  simethicone 80 milliGRAM(s) Chew daily  timolol 0.5% Solution 1 Drop(s) Both EYES two times a day    MEDICATIONS  (PRN):  acetaminophen   Tablet .. 650 milliGRAM(s) Oral every 6 hours PRN Temp greater or equal to 38C (100.4F), Moderate Pain (4 - 6)  ALBUTerol/ipratropium for Nebulization 3 milliLiter(s) Nebulizer every 6 hours PRN Shortness of Breath and/or Wheezing      ALLERGIES:  Allergies    No Known Allergies    Intolerances        VITAL SIGNS:  Vital Signs Last 24 Hrs  T(C): 37.3 (08 May 2019 04:03), Max: 37.3 (07 May 2019 15:53)  T(F): 99.1 (08 May 2019 04:03), Max: 99.2 (07 May 2019 15:53)  HR: 97 (08 May 2019 15:06) (79 - 102)  BP: 164/75 (08 May 2019 14:53) (96/60 - 192/91)  BP(mean): --  RR: 20 (08 May 2019 15:06) (16 - 25)  SpO2: 96% (08 May 2019 15:06) (94% - 98%)    05-07-19 @ 07:01  -  05-08-19 @ 07:00  --------------------------------------------------------  IN:  Total IN: 0 mL    OUT:    Indwelling Catheter - Urethral: 1160 mL  Total OUT: 1160 mL    Total NET: -1160 mL      05-08-19 @ 07:01  -  05-08-19 @ 15:22  --------------------------------------------------------  IN:  Total IN: 0 mL    OUT:    Indwelling Catheter - Urethral: 1200 mL  Total OUT: 1200 mL    Total NET: -1200 mL          PHYSICAL EXAM:  Constitutional: respiratory distress, tachypneic, coughing   Head: NC/AT  Eyes: PERRL, EOMI, anicteric sclera  ENT: no nasal discharge; uvula midline, no oropharyngeal erythema or exudates; MMM  Neck: supple; no JVD or thyromegaly  Respiratory: decreased breath sounds throughout, dull to percussion. tachypneic, sternocleidomastoid use. tripoding   Cardiac: +S1/S2/S3; tachycardia; no M/R/G; PMI non-displaced  Gastrointestinal: abdomen soft, NT/ND; no rebound or guarding; +BSx4  Extremities: b/l 2+ edema   Musculoskeletal: NROM x4; no joint swelling, tenderness or erythema  Vascular: 2+ radial, femoral, DP/PT pulses B/L  Dermatologic: skin warm, dry and intact; no rashes, wounds, or scars    LABS:                        11.4   9.21  )-----------( 268      ( 08 May 2019 05:40 )             36.2     05-08    132<L>  |  90<L>  |  12  ----------------------------<  91  4.8   |  35<H>  |  0.45<L>    Ca    8.3<L>      08 May 2019 05:40  Mg     1.9     05-08    TPro  5.8<L>  /  Alb  2.2<L>  /  TBili  0.3  /  DBili  x   /  AST  20  /  ALT  7<L>  /  AlkPhos  100  05-08      Urinalysis Basic - ( 07 May 2019 17:08 )    Color: x / Appearance: x / SG: x / pH: x  Gluc: x / Ketone: x  / Bili: x / Urobili: x   Blood: x / Protein: x / Nitrite: x   Leuk Esterase: x / RBC: > 10 /HPF / WBC 5-10 /HPF   Sq Epi: x / Non Sq Epi: 5-10 /HPF / Bacteria: Present /HPF      CARDIAC MARKERS ( 07 May 2019 16:38 )  x     / 0.01 ng/mL / x     / x     / x          CAPILLARY BLOOD GLUCOSE          Serum Pro-Brain Natriuretic Peptide: 320 pg/mL (05-07-19 @ 16:38)      RADIOLOGY & ADDITIONAL TESTS: Reviewed.

## 2019-05-08 NOTE — PROGRESS NOTE ADULT - SUBJECTIVE AND OBJECTIVE BOX
OVERNIGHT EVENTS: PO    SUBJECTIVE: Pt seen and examined at bedside. Pt appears somnolent, awakens to verbal stimuli but not compliant with full ROS.     Vital Signs Last 12 Hrs  T(F): 99.1 (05-08-19 @ 04:03), Max: 99.1 (05-08-19 @ 04:03)  HR: 79 (05-08-19 @ 09:12) (79 - 82)  BP: 141/65 (05-08-19 @ 09:12) (137/67 - 141/65)  BP(mean): --  RR: 16 (05-08-19 @ 09:12) (16 - 18)  SpO2: 98% (05-08-19 @ 09:12) (95% - 98%)  I&O's Summary    07 May 2019 07:01  -  08 May 2019 07:00  --------------------------------------------------------  IN: 0 mL / OUT: 1160 mL / NET: -1160 mL    PHYSICAL EXAM:  Constitutional: Elderly Male, laying in bed, grossly anasarcic, NAD  HEENT: NC/AT, PERRLA, EOMI, no conjunctival pallor or scleral icterus, MMM  Neck: Supple, no JVD  Respiratory: Poor inspiratory effort, bibasilar crackles.  Cardiovascular: RRR, normal S1 and S2, no m/r/g.   Gastrointestinal: +BS, edematous, nontender, no organomegaly noted  Extremities: wwp; gross edema b/l upper and lower extremities  Vascular: Pulses equal and strong throughout.   Neurological: AAOx3, no CN deficits, strength and sensation intact throughout.   Skin: No gross skin abnormalities or rashes        LABS:                        11.4   9.21  )-----------( 268      ( 08 May 2019 05:40 )             36.2     05-08    132<L>  |  90<L>  |  12  ----------------------------<  91  4.8   |  35<H>  |  0.45<L>    Ca    8.3<L>      08 May 2019 05:40  Mg     1.9     05-08    TPro  5.8<L>  /  Alb  2.2<L>  /  TBili  0.3  /  DBili  x   /  AST  20  /  ALT  7<L>  /  AlkPhos  100  05-08      Urinalysis Basic - ( 07 May 2019 17:08 )    Color: x / Appearance: x / SG: x / pH: x  Gluc: x / Ketone: x  / Bili: x / Urobili: x   Blood: x / Protein: x / Nitrite: x   Leuk Esterase: x / RBC: > 10 /HPF / WBC 5-10 /HPF   Sq Epi: x / Non Sq Epi: 5-10 /HPF / Bacteria: Present /HPF        RADIOLOGY & ADDITIONAL TESTS:    MEDICATIONS  (STANDING):  albumin human 25% IVPB 50 milliLiter(s) IV Intermittent once  aspirin  chewable 81 milliGRAM(s) Oral daily  atorvastatin 20 milliGRAM(s) Oral at bedtime  ceFAZolin  Injectable. 2000 milliGRAM(s) IV Push every 8 hours  docusate sodium 100 milliGRAM(s) Oral two times a day  furosemide   Injectable 40 milliGRAM(s) IV Push every 12 hours  heparin  Injectable 5000 Unit(s) SubCutaneous every 8 hours  losartan 100 milliGRAM(s) Oral daily  rifampin 600 milliGRAM(s) Oral daily  senna 1 Tablet(s) Oral daily  simethicone 80 milliGRAM(s) Chew daily  timolol 0.5% Solution 1 Drop(s) Both EYES two times a day    MEDICATIONS  (PRN):  acetaminophen   Tablet .. 650 milliGRAM(s) Oral every 6 hours PRN Temp greater or equal to 38C (100.4F), Moderate Pain (4 - 6)  ALBUTerol/ipratropium for Nebulization 3 milliLiter(s) Nebulizer every 6 hours PRN Shortness of Breath and/or Wheezing

## 2019-05-08 NOTE — PROGRESS NOTE ADULT - ASSESSMENT
89M who presents with fluid overload status. Given that patient's previous echocardiogram showed normal function and he has hypoalbuminemia likelihood of CHF exacerbation is low. Most likely this is from decreased oncotic pressure. Patient has poor nutritional intake as per family friend at bedside but unclear if that would be only cause of hypoalbuminemia. Other liver synthetic function appears normal and no protein is observed in urine.

## 2019-05-08 NOTE — PROVIDER CONTACT NOTE (OTHER) - ACTION/TREATMENT ORDERED:
placed on NRB sat 97% at this time, NT suction successful. CXR ordered and obtained. Dr Witt and team at bedside.

## 2019-05-09 ENCOUNTER — APPOINTMENT (OUTPATIENT)
Dept: INFECTIOUS DISEASE | Facility: CLINIC | Age: 84
End: 2019-05-09

## 2019-05-09 DIAGNOSIS — Z71.89 OTHER SPECIFIED COUNSELING: ICD-10-CM

## 2019-05-09 DIAGNOSIS — R06.02 SHORTNESS OF BREATH: ICD-10-CM

## 2019-05-09 DIAGNOSIS — T17.908A UNSPECIFIED FOREIGN BODY IN RESPIRATORY TRACT, PART UNSPECIFIED CAUSING OTHER INJURY, INITIAL ENCOUNTER: ICD-10-CM

## 2019-05-09 DIAGNOSIS — Z51.5 ENCOUNTER FOR PALLIATIVE CARE: ICD-10-CM

## 2019-05-09 PROCEDURE — 74019 RADEX ABDOMEN 2 VIEWS: CPT | Mod: 26

## 2019-05-09 PROCEDURE — 71045 X-RAY EXAM CHEST 1 VIEW: CPT | Mod: 26

## 2019-05-09 PROCEDURE — 99497 ADVNCD CARE PLAN 30 MIN: CPT | Mod: 25

## 2019-05-09 PROCEDURE — 99223 1ST HOSP IP/OBS HIGH 75: CPT | Mod: GC

## 2019-05-09 PROCEDURE — 99233 SBSQ HOSP IP/OBS HIGH 50: CPT

## 2019-05-09 RX ORDER — MORPHINE SULFATE 50 MG/1
1 CAPSULE, EXTENDED RELEASE ORAL
Refills: 0 | Status: DISCONTINUED | OUTPATIENT
Start: 2019-05-09 | End: 2019-05-10

## 2019-05-09 RX ORDER — CHLORHEXIDINE GLUCONATE 213 G/1000ML
1 SOLUTION TOPICAL
Refills: 0 | Status: DISCONTINUED | OUTPATIENT
Start: 2019-05-09 | End: 2019-05-10

## 2019-05-09 RX ADMIN — LOSARTAN POTASSIUM 100 MILLIGRAM(S): 100 TABLET, FILM COATED ORAL at 09:51

## 2019-05-09 RX ADMIN — Medication 60 MILLIGRAM(S): at 09:50

## 2019-05-09 RX ADMIN — HEPARIN SODIUM 5000 UNIT(S): 5000 INJECTION INTRAVENOUS; SUBCUTANEOUS at 05:44

## 2019-05-09 RX ADMIN — CHLORHEXIDINE GLUCONATE 1 APPLICATION(S): 213 SOLUTION TOPICAL at 12:54

## 2019-05-09 RX ADMIN — MORPHINE SULFATE 1 MILLIGRAM(S): 50 CAPSULE, EXTENDED RELEASE ORAL at 20:30

## 2019-05-09 RX ADMIN — MORPHINE SULFATE 1 MILLIGRAM(S): 50 CAPSULE, EXTENDED RELEASE ORAL at 19:50

## 2019-05-09 RX ADMIN — Medication 1 DROP(S): at 19:48

## 2019-05-09 RX ADMIN — Medication 1 DROP(S): at 05:22

## 2019-05-09 RX ADMIN — Medication 60 MILLIGRAM(S): at 22:24

## 2019-05-09 RX ADMIN — MORPHINE SULFATE 1 MILLIGRAM(S): 50 CAPSULE, EXTENDED RELEASE ORAL at 17:18

## 2019-05-09 RX ADMIN — Medication 81 MILLIGRAM(S): at 12:48

## 2019-05-09 RX ADMIN — Medication 2000 MILLIGRAM(S): at 05:21

## 2019-05-09 RX ADMIN — MORPHINE SULFATE 1 MILLIGRAM(S): 50 CAPSULE, EXTENDED RELEASE ORAL at 17:13

## 2019-05-09 RX ADMIN — MORPHINE SULFATE 1 MILLIGRAM(S): 50 CAPSULE, EXTENDED RELEASE ORAL at 15:42

## 2019-05-09 NOTE — DIETITIAN INITIAL EVALUATION ADULT. - ENERGY NEEDS
ABW used for calculations as pt between % of IBW.   ABW 83kg, IBW 75kg, 110% IBW, ht 70", BMI 26.3 (all from prior admission, please record current)  Nutrient needs based on [facility] standards of care for maintenance in older adults, fluid per team 2/2 fluid overload

## 2019-05-09 NOTE — PROGRESS NOTE ADULT - SUBJECTIVE AND OBJECTIVE BOX
OVERNIGHT EVENTS: PO, tolerated HFNC well.    SUBJECTIVE: Pt seen and examined at bedside. On HFNC. Endorses improvement in breathing but still seems altered. Denies fever, chest pain, abdominal pain, n/v/d/c.     Vital Signs Last 12 Hrs  T(F): 98.7 (05-09-19 @ 04:45), Max: 98.7 (05-09-19 @ 04:45)  HR: 92 (05-09-19 @ 09:19) (85 - 92)  BP: 206/92 (05-09-19 @ 09:19) (152/76 - 206/92)  BP(mean): 134 (05-09-19 @ 09:19) (108 - 134)  RR: 36 (05-09-19 @ 09:19) (30 - 36)  SpO2: 99% (05-09-19 @ 09:19) (96% - 99%)  I&O's Summary    08 May 2019 07:01  -  09 May 2019 07:00  --------------------------------------------------------  IN: 100 mL / OUT: 2150 mL / NET: -2050 mL    09 May 2019 07:01  -  09 May 2019 13:34  --------------------------------------------------------  IN: 0 mL / OUT: 150 mL / NET: -150 mL        PHYSICAL EXAM:  Constitutional: Elderly Male, laying in bed, grossly anasarcic, NAD, HFNC in place  HEENT: NC/AT, PERRLA, EOMI, no conjunctival pallor or scleral icterus, MMM  Neck: Supple, no JVD  Respiratory: Poor inspiratory effort, decreased breath sounds b/l bases to mid lung field.  Cardiovascular: RRR, normal S1 and S2, no m/r/g.   Gastrointestinal: +BS, distended, nontender, no organomegaly noted  Extremities: wwp; gross edema b/l upper and lower extremities  Vascular: Pulses equal and strong throughout.   Neurological: AAOx1 (name), no CN deficits, strength and sensation intact throughout.   Skin: No gross skin abnormalities or rashes        LABS:                        11.4   9.21  )-----------( 268      ( 08 May 2019 05:40 )             36.2     05-08    132<L>  |  90<L>  |  16  ----------------------------<  127<H>  4.5   |  35<H>  |  0.65    Ca    8.2<L>      08 May 2019 18:31  Mg     1.9     05-08    TPro  5.6<L>  /  Alb  2.3<L>  /  TBili  0.5  /  DBili  x   /  AST  29  /  ALT  11  /  AlkPhos  161<H>  05-08      Urinalysis Basic - ( 07 May 2019 17:08 )    Color: x / Appearance: x / SG: x / pH: x  Gluc: x / Ketone: x  / Bili: x / Urobili: x   Blood: x / Protein: x / Nitrite: x   Leuk Esterase: x / RBC: > 10 /HPF / WBC 5-10 /HPF   Sq Epi: x / Non Sq Epi: 5-10 /HPF / Bacteria: Present /HPF        RADIOLOGY & ADDITIONAL TESTS:    MEDICATIONS  (STANDING):  aspirin  chewable 81 milliGRAM(s) Oral daily  atorvastatin 20 milliGRAM(s) Oral at bedtime  ceFAZolin  Injectable. 2000 milliGRAM(s) IV Push every 8 hours  chlorhexidine 2% Cloths 1 Application(s) Topical <User Schedule>  docusate sodium 100 milliGRAM(s) Oral two times a day  donepezil 10 milliGRAM(s) Oral at bedtime  furosemide   Injectable 60 milliGRAM(s) IV Push every 12 hours  heparin  Injectable 5000 Unit(s) SubCutaneous every 8 hours  losartan 100 milliGRAM(s) Oral daily  rifampin 600 milliGRAM(s) Oral daily  senna 1 Tablet(s) Oral daily  simethicone 80 milliGRAM(s) Chew daily  timolol 0.5% Solution 1 Drop(s) Both EYES two times a day    MEDICATIONS  (PRN):  acetaminophen   Tablet .. 650 milliGRAM(s) Oral every 6 hours PRN Temp greater or equal to 38C (100.4F), Moderate Pain (4 - 6)  ALBUTerol/ipratropium for Nebulization 3 milliLiter(s) Nebulizer every 6 hours PRN Shortness of Breath and/or Wheezing

## 2019-05-09 NOTE — PROGRESS NOTE ADULT - ATTENDING COMMENTS
See resident note written above, for details. I reviewed the documentation.  I reviewed vitals, labs, medications, cardiac studies and additional imaging 5/8/19.  I agree with the resident's findings and plans as written above with the following additions/amendments:  89WM DNR/DNI with Dementia, SSS s/p PPM, HTN, and recent admission for MSSA bacteremia 2/2 discitis on Cefazolin and Rifampin who presents with fluid overload status in context of hypoalbuminemia. Patient with acute aspiration episode in ED with food particles and liquid suctioned from oropharynx. Patient placed on HFNC. HCP at bedside noted to comment on how he had desired for patient to pass at home instead of in the hospital. ICU consult called, patient stabilized for floor with continued discussion of goals of care.   Plan for:  Lasix 40mg IV BID   HFNC, avoid BIPAP if still suctioning food particles given risk of worsening aspiration/impaction  Coreg 6.25 BID  Holding amlodipine d/t peripheral edema  Obtain TTE for structural assessment  ACE wrap lower extremities  Dietician/Nutrition consultation  Palliative care consultation to assist with further clarification of goals of care, DNR/DNI MOLST in EMR   KAN Branch.  Cardiology Attending
See resident note written above, for details. I reviewed the documentation.  I reviewed vitals, labs, medications, cardiac studies and additional imaging 5/9/19.  I agree with the resident's findings and plans as written above with the following additions/amendments:  89WM DNR/DNI with Dementia, SSS s/p PPM, HTN, and recent admission for MSSA bacteremia 2/2 discitis on Cefazolin and Rifampin who presents with fluid overload status in context of hypoalbuminemia. Patient with acute aspiration episode in ED. Made NPO for protection of airway and placed on HFNC.   Plan for:  Transition to 40% Ventimask in preparation to transition to inpatient hospice. Heppner MMW  Low dose morphine prn air hunger  DC labs as no longer monitoring  Palliative care assisting with placement  KAN Branch.  Cardiology Attending

## 2019-05-09 NOTE — CONSULT NOTE ADULT - PROBLEM SELECTOR RECOMMENDATION 9
- Echocardiogram pending  - lasix 40mg IV BID w/ albumin 25% 50cc  - Likely the cause of hyponatremia, will continue to monitor sodium level  - Will hold Norvasc in setting of diffuse edema, unlikely to be cause but could be contributing  - Will need good BP control, Sys<130mmHg to aid in keeping hydrostatic pressures down. - Echocardiogram pending  - lasix 40mg IV BID w/ albumin 25% 50cc given for diuresis  - Will need good BP control, Sys<130mmHg to aid in keeping hydrostatic pressures down. - Echocardiogram pending  - lasix 40mg IV BID w/ albumin 25% 50cc given for diuresis  - Will need good BP control, Sys<130mmHg to aid in keeping hydrostatic pressures down.  - recommend Morphine IV 1mg q2hr and q1hr PRN breakthrough for respiratory distress.

## 2019-05-09 NOTE — DIETITIAN INITIAL EVALUATION ADULT. - OTHER INFO
89M with SSS s/p PPM, HTN, Glaucoma in both eyes (multiple surgeries at [facility]), pyogenic granuloma of eyelid s/p surgery, dementia, recent admission with MSSA bacteremia 2/2 discitis who presents from [facility] with increasing work of breathing and fluid overload. Patient is not able to provide much history, history obtained from chart. Patient has been getting progressively more hypoxic at , O2 was started and increased to 4LNC there. Recent admission in April for MSSA discitis. Course c/b aspiration event requiring escalation from NC to HFNC. Pt noted with hypoalbuminemia likely 2/2 fluid overload and poor nutritional status. Pal care following pt to discuss GOC with HCP, stating he would like pt to pass at home but no further decisions have yet been made. Known to nutrition services from last admission wt noted at 83kg, recommend recording ht and wt to accuratley asses current nutritional status/ assess wt loss as pt unable to provide UBW - paged team, no visual s/sx of wasting however at risk for malnutrition, will follow. NPO at this time pending further evaluation, recommend SLP eval. No noted n/v/c, diarrhea noted, skin with nathaly 13, does not appear to be in pain however unable to assess d/t mental status, no family at bedside at time of visit, remains on HFNC. Will continue to follow and align nutrition with GOC at all times.

## 2019-05-09 NOTE — DIETITIAN INITIAL EVALUATION ADULT. - PROBLEM SELECTOR PLAN 4
w/ MSSA bacteremia on previous admission  -Continue Cefazolin 2g q8h End 5/28/19, may be extended by Dr. Ann  -Continue Rifampin 600mg Qd End 5/28/19, may be extended by Dr. Ann  -Alert ID of patient readmission in AM

## 2019-05-09 NOTE — DIETITIAN INITIAL EVALUATION ADULT. - PROBLEM SELECTOR PLAN 7
-On outpatient stimulant Galantamine  -Med Rec?  -If patient to continue here >24h will need formulary exchange will discuss with pharmacy in AM

## 2019-05-09 NOTE — CONSULT NOTE ADULT - PROBLEM SELECTOR RECOMMENDATION 3
-See above  -Nutrition consult  -Supplement meals  -Appetite stimulant may be needed. - See above  - Nutrition consult  - Supplement meals  - Appetite stimulant may be needed. - See above  - Nutrition consult - NPO for now  - Supplement meals  - HCP does not want NGT placed for pt

## 2019-05-09 NOTE — CONSULT NOTE ADULT - PROBLEM SELECTOR RECOMMENDATION 4
Pt on HFNC after aspiration event yesterday  - avoiding BiPAP for now due to the aspiration  - wean as tolerates Pt on HFNC after aspiration event yesterday  - avoiding BiPAP for now due to the aspiration  - recommend Morphine IV 1mg q2hr and q1hr PRN breakthrough for respiratory distress.  - will need to be weaned off of HFNC before he can go to [facility] hospice.

## 2019-05-09 NOTE — PROGRESS NOTE ADULT - ASSESSMENT
89M w/ PMHx dementia, SSS s/p PPM, HTN, recent admission for MSSA discitis on Ancef and Rifampin who presents with fluid overload status likely 2/2 hypoalbuminemia with course c/b aspiration event requiring escalation from NC to HFNC.

## 2019-05-09 NOTE — CONSULT NOTE ADULT - PROBLEM SELECTOR RECOMMENDATION 2
w/ MSSA bacteremia on previous admission  - Continue Cefazolin 2g q8h End 5/28/19, may be extended by Dr. Ann  - Continue Rifampin 600mg Qd End 5/28/19, may be extended by Dr. Ann  - Dr. Ann aware: after 5/28, will likely need to transition to cephalexin 500mg PO q8h for chronic suppressive therapy for 3 months  - pt will follow with Dr. Ann as outpatient w/ MSSA bacteremia on previous admission  - Continue Cefazolin 2g q8h ending 5/28/19  - Continue Rifampin 600mg Qd ending 5/28/19  - Dr. Ann aware: after 5/28, will likely need to transition to cephalexin 500mg PO q8h for chronic suppressive therapy for 3 months  - pt will follow with Dr. Ann as outpatient w/ MSSA bacteremia on previous admission  - Per ID Continue Cefazolin 2g q8h ending 5/28/19  - Per ID Continue Rifampin 600mg Qd ending 5/28/19  - Dr. Ann aware: after 5/28, will likely need to transition to cephalexin 500mg PO q8h for chronic suppressive therapy for 3 months  - pt will follow with Dr. Ann as outpatient  - After discussion with HCP, decision was made to discontinue antibiotics and have pt go to inpatient hospice care as HCP made it known that pt would not want to have an extensive treatment plan at this point in his disease course

## 2019-05-09 NOTE — CONSULT NOTE ADULT - ASSESSMENT
89M with SSS s/p PPM, dementia, recent admission with MSSA bacteremia 2/2 discitis who presents with acute on chronic respiratory failure due to aspiration event. Suctioned at bedside but unable to clear all patient's secretions. Discussed respiratory failure with HCP who reiterated that patient's goals of care were to limit invasive procedures or burdensome procedures. MOLST form in the chart confirming DNR/DNI. No role for medical telemetry or ICU at this time. Can consider broader spectrum abx if patient develops evidence of aspiration PNA. Can trial BiPAP if patient tolerates/within their goals of care.
89M who presents with fluid overload status. Given that patient's previous echocardiogram showed normal function and he has hypoalbuminemia likelihood of CHF exacerbation is low. Most likely this is from decreased oncotic pressure. Patient has poor nutritional intake as per family friend at bedside but unclear if that would be only cause of hypoalbuminemia. Other liver synthetic function appears normal and no protein is observed in urine.
89 yo male with PPM, L4-S1 PSF, L2-S1 laminectomy > 10 years ago, p/w LBP, found to have T12-L1 discitis and MSSA BSI. Of note, gallium scan did not show ICD involvement and SUSANA not pursued due to patient's goals of care during last admission. Now here with volume overload.  - please check ESR and CRP with next AM labs  - weekly CBC, CMP, ESR, CRP to be faxed to my office [phone]  - continue cefazolin 2g IV q8h + rifampin 600mg PO [id number] at least 6 week course through 5/28/19; following this, anticipate transition to cephalexin 500mg PO q8h for chronic suppressive therapy X 3 months (i.e. until the end of August)    Will reschedule outpatient f/u with me    Please reconsult with ?  ID Team 2

## 2019-05-09 NOTE — CONSULT NOTE ADULT - PROBLEM SELECTOR RECOMMENDATION 6
Pt with MOLST and HCP in chart  - DNR/DNI  - HCP, Mir Nba, [phone]  - HCP mentioned to primary team that he would have liked the pt to pass at home rather than in the hospital as hospital  - pending discussion with HCP about GOC and future placement. Pt with MOLST and HCP in chart  - DNR/DNI  - HCP, Mir Arango, [phone]  - Meeting with HCP, Mir Arango, was held and he made ti clear that the pt would not want to have an extensive workup and that he would like to pursue a inpatient hospice admission for the continuation of care.  - abx stopped  - oral medications stopped  - pending [facility] hospice  - no escalation of care In addition to E and M visit documented above an Advance care planning meeting was held  Start time: 1;30 PM  End time: 2:00 PM  Total time:  30 minutes    A face to face meeting to discuss advance care planning was held today regarding: MIR MARLEY  Primary decision maker: Mir Arango  Alternate/surrogate:  Discussed advance directives including, but not limited to, healthcare proxy and code status.  Decision regarding code status: DNR/DNI  Documentation completed today:    Pt with MOLST and HCP in chart  - DNR/DNI  - HCP, Mir Arango, [phone]  - Meeting with HCP, Mir Arango, was held and he made ti clear that the pt would not want to have an extensive workup and that he would like to pursue a inpatient hospice admission for the continuation of care.  - abx stopped  - oral medications stopped  - pending [facility] hospice  - no escalation of care

## 2019-05-09 NOTE — CONSULT NOTE ADULT - SUBJECTIVE AND OBJECTIVE BOX
MIR MARLEY          MRN-[id number]            (mm/dd/yyyy)    HPI:  89 y/o M with SSS s/p PPM, HTN, Glaucoma in both eyes (multiple surgeries at [facility]), pyogenic granuloma of eyelid s/p surgery, dementia, recent admission with MSSA bacteremia 2/2 discitis who presents from [facility] with increasing work of breathing and fluid overload. Patient is not able to provide much history, history obtained from chart. Patient has been getting progressively more hypoxic at , O2 was started and increased to 4LNC there. Work up there concerning for fluid overload so patient was sent to [facility] ED.    In the ED T99.2, HR90, BP96/60, RR20, 95% on 4LNC.. He was given Lasix 40 IVP. (07 May 2019 22:30)    Pt seen at bedside this morning was more awake and alert compared to yesterday per primary team. Pt was able to answer basic questions, but could not into many details. Pt denied any pain or difficulty breathing. Further discussion with HCP is pending.      PAST MEDICAL & SURGICAL HISTORY:  Glaucoma  Dementia  RBBB (right bundle branch block with left anterior fascicular block)  HLD (hyperlipidemia)  Essential hypertension  S/P lumbar spinal fusion  H/O laminectomy      FAMILY HISTORY:   Reviewed and found non contributory in mother or father    SOCIAL HISTORY: Pt is a former smoker, quit about 30 years ago. Pt rarely drank alcohol. Pt was a  in [place].    ROS:    Limited due to: Alzheimer's Disease                    Dyspnea (Monica 0-10):  7                     N/V (Y/N): N                             Secretions (Y/N): N                Agitation(Y/N): N  Pain (Y/N): N      -Provocation/Palliation:  -Quality/Quantity:  -Radiating:  -Severity:  -Timing/Frequency:  -Impact on ADLs:    General:  Denied pain  HEENT:    Denied  Neck:  Denied  CVS:  Denied chest pain  Resp:  Denied respiratory distress  GI:  Denied n/v  :  Denied  Musc:  Denied  Neuro:  Denied  Psych:  Denied  Skin:  Denied  Lymph:  Denied    Allergies    No Known Allergies    Intolerances      Opiate Naive (Y/N):   -iStop reviewed (Y/N): (Ref#:           )    Medications:      MEDICATIONS  (STANDING):  aspirin  chewable 81 milliGRAM(s) Oral daily  atorvastatin 20 milliGRAM(s) Oral at bedtime  ceFAZolin  Injectable. 2000 milliGRAM(s) IV Push every 8 hours  chlorhexidine 2% Cloths 1 Application(s) Topical <User Schedule>  docusate sodium 100 milliGRAM(s) Oral two times a day  donepezil 10 milliGRAM(s) Oral at bedtime  furosemide   Injectable 60 milliGRAM(s) IV Push every 12 hours  heparin  Injectable 5000 Unit(s) SubCutaneous every 8 hours  losartan 100 milliGRAM(s) Oral daily  rifampin 600 milliGRAM(s) Oral daily  senna 1 Tablet(s) Oral daily  simethicone 80 milliGRAM(s) Chew daily  timolol 0.5% Solution 1 Drop(s) Both EYES two times a day    MEDICATIONS  (PRN):  acetaminophen   Tablet .. 650 milliGRAM(s) Oral every 6 hours PRN Temp greater or equal to 38C (100.4F), Moderate Pain (4 - 6)  ALBUTerol/ipratropium for Nebulization 3 milliLiter(s) Nebulizer every 6 hours PRN Shortness of Breath and/or Wheezing      Labs:    CBC:                        11.4   9.21  )-----------( 268      ( 08 May 2019 05:40 )             36.2     CMP:        132<L>  |  90<L>  |  16  ----------------------------<  127<H>  4.5   |  35<H>  |  0.65    Ca    8.2<L>      08 May 2019 18:31  Mg     1.9         TPro  5.6<L>  /  Alb  2.3<L>  /  TBili  0.5  /  DBili  x   /  AST  29  /  ALT  11  /  AlkPhos  161<H>        Urinalysis Basic - ( 07 May 2019 17:08 )    Color: x / Appearance: x / SG: x / pH: x  Gluc: x / Ketone: x  / Bili: x / Urobili: x   Blood: x / Protein: x / Nitrite: x   Leuk Esterase: x / RBC: > 10 /HPF / WBC 5-10 /HPF   Sq Epi: x / Non Sq Epi: 5-10 /HPF / Bacteria: Present /HPF        Imaging:  Reviewed    PEx:  T(C): 37.1 (19 @ 04:45), Max: 37.4 (19 @ 22:09)  HR: 92 (19 @ 09:19) (81 - 102)  BP: 206/92 (19 @ 09:19) (139/74 - 206/92)  RR: 36 (19 @ 09:19) (18 - 36)  SpO2: 99% (19 @ 09:19) (95% - 99%)  Wt(kg): --  Daily     Daily Weight in k.4 (09 May 2019 04:45)  CAPILLARY BLOOD GLUCOSE        I&O's Summary    08 May 2019 07:  -  09 May 2019 07:00  --------------------------------------------------------  IN: 100 mL / OUT: 2150 mL / NET: -2050 mL    09 May 2019 07:  -  09 May 2019 12:37  --------------------------------------------------------  IN: 0 mL / OUT: 150 mL / NET: -150 mL        General: Elderly male with HFNC in place, anasarca present  HEENT: NC/AT, no conjunctival pallor or scleral icterus, MMM  Neck: Supple, no jvd  Respiratory: HFCN in place, bibasilar crackles heard  Cardiovascular: S1/S2, regular rhythm, tachycardic no murmurs heard.   Gastrointestinal: soft, nontender, nondistended, bowel sounds present x4  Extremities: warm, well perfused; gross edema b/l upper and lower extremities  Vascular: Pulses equal and strong throughout.   Neurological: AAOx3, no CN deficits, motor and sensory grossly intact throughout.   Skin: No gross skin abnormalities or rashes  Preadmit Karnofsky:  40%           Current Karnofsky:    20%  Cachexia (Y/N): N  BMI:    Advanced Directives:     DNR/DNI     MOLST in chart     DPOA     Living Will     Decision maker: HCP Mir Arango [phone]  Legal surrogate: HCP Mir Arango [phone]    GOALS OF CARE DISCUSSION       Palliative care info/counseling provided	           Family meeting       Advanced Directives addressed please see Advance Care Planning Note	           See previous Palliative Medicine Note       Documentation of GOC: 	          REFERRALS	        Palliative Med        Unit SW/Case Mgmt              Speech/Swallow       Patient/Family Support       Massage Therapy       Music Therapy       Hospice       Nutrition       Ethics       PT/OT MIR MARLEY          MRN-[id number]            (mm/dd/yyyy)    HPI:  89 y/o M with SSS s/p PPM, HTN, Glaucoma in both eyes (multiple surgeries at [facility]), pyogenic granuloma of eyelid s/p surgery, dementia, recent admission with MSSA bacteremia 2/2 discitis who presents from [facility] with increasing work of breathing and fluid overload. Patient is not able to provide much history, history obtained from chart. Patient has been getting progressively more hypoxic at , O2 was started and increased to 4LNC there. Work up there concerning for fluid overload so patient was sent to [facility] ED.    In the ED T99.2, HR90, BP96/60, RR20, 95% on 4LNC.. He was given Lasix 40 IVP. (07 May 2019 22:30)    Pt seen at bedside this morning was more awake and alert compared to yesterday per primary team. Pt was able to answer basic questions, but could not into many details. Pt denied any pain or difficulty breathing. Upon discussion with HCP, HCP made it clear that Pt would not have wanted this extensive workup. HCP would like to pursue an inpatient hospice course for the patient.      PAST MEDICAL & SURGICAL HISTORY:  Glaucoma  Dementia  RBBB (right bundle branch block with left anterior fascicular block)  HLD (hyperlipidemia)  Essential hypertension  S/P lumbar spinal fusion  H/O laminectomy      FAMILY HISTORY:   Reviewed and found non contributory in mother or father    SOCIAL HISTORY: Pt is a former smoker, quit about 30 years ago. Pt rarely drank alcohol. Pt was a  in [place].    ROS:    Limited due to: Alzheimer's Disease                    Dyspnea (Monica 0-10):  7                     N/V (Y/N): N                             Secretions (Y/N): N                Agitation(Y/N): N  Pain (Y/N): N      -Provocation/Palliation:  -Quality/Quantity:  -Radiating:  -Severity:  -Timing/Frequency:  -Impact on ADLs:    General:  Denied pain  HEENT:    Denied  Neck:  Denied  CVS:  Denied chest pain  Resp:  Denied respiratory distress  GI:  Denied n/v  :  Denied  Musc:  Denied  Neuro:  Denied  Psych:  Denied  Skin:  Denied  Lymph:  Denied    Allergies    No Known Allergies    Intolerances      Opiate Naive (Y/N):   -iStop reviewed (Y/N): (Ref#:           )    Medications:      MEDICATIONS  (STANDING):  aspirin  chewable 81 milliGRAM(s) Oral daily  atorvastatin 20 milliGRAM(s) Oral at bedtime  ceFAZolin  Injectable. 2000 milliGRAM(s) IV Push every 8 hours  chlorhexidine 2% Cloths 1 Application(s) Topical <User Schedule>  docusate sodium 100 milliGRAM(s) Oral two times a day  donepezil 10 milliGRAM(s) Oral at bedtime  furosemide   Injectable 60 milliGRAM(s) IV Push every 12 hours  heparin  Injectable 5000 Unit(s) SubCutaneous every 8 hours  losartan 100 milliGRAM(s) Oral daily  rifampin 600 milliGRAM(s) Oral daily  senna 1 Tablet(s) Oral daily  simethicone 80 milliGRAM(s) Chew daily  timolol 0.5% Solution 1 Drop(s) Both EYES two times a day    MEDICATIONS  (PRN):  acetaminophen   Tablet .. 650 milliGRAM(s) Oral every 6 hours PRN Temp greater or equal to 38C (100.4F), Moderate Pain (4 - 6)  ALBUTerol/ipratropium for Nebulization 3 milliLiter(s) Nebulizer every 6 hours PRN Shortness of Breath and/or Wheezing      Labs:    CBC:                        11.4   9.21  )-----------( 268      ( 08 May 2019 05:40 )             36.2     CMP:        132<L>  |  90<L>  |  16  ----------------------------<  127<H>  4.5   |  35<H>  |  0.65    Ca    8.2<L>      08 May 2019 18:31  Mg     1.9         TPro  5.6<L>  /  Alb  2.3<L>  /  TBili  0.5  /  DBili  x   /  AST  29  /  ALT  11  /  AlkPhos  161<H>        Urinalysis Basic - ( 07 May 2019 17:08 )    Color: x / Appearance: x / SG: x / pH: x  Gluc: x / Ketone: x  / Bili: x / Urobili: x   Blood: x / Protein: x / Nitrite: x   Leuk Esterase: x / RBC: > 10 /HPF / WBC 5-10 /HPF   Sq Epi: x / Non Sq Epi: 5-10 /HPF / Bacteria: Present /HPF        Imaging:  Reviewed    PEx:  T(C): 37.1 (19 @ 04:45), Max: 37.4 (19 @ 22:09)  HR: 92 (19 @ 09:19) (81 - 102)  BP: 206/92 (19 @ 09:19) (139/74 - 206/92)  RR: 36 (19 @ 09:19) (18 - 36)  SpO2: 99% (19 @ 09:19) (95% - 99%)  Wt(kg): --  Daily     Daily Weight in k.4 (09 May 2019 04:45)  CAPILLARY BLOOD GLUCOSE        I&O's Summary    08 May 2019 07:01  -  09 May 2019 07:00  --------------------------------------------------------  IN: 100 mL / OUT: 2150 mL / NET: -2050 mL    09 May 2019 07:01  -  09 May 2019 12:37  --------------------------------------------------------  IN: 0 mL / OUT: 150 mL / NET: -150 mL        General: Elderly male with HFNC in place, anasarca present  HEENT: NC/AT, no conjunctival pallor or scleral icterus, MMM  Neck: Supple, no jvd  Respiratory: HFCN in place, bibasilar crackles heard  Cardiovascular: S1/S2, regular rhythm, tachycardic no murmurs heard.   Gastrointestinal: soft, nontender, nondistended, bowel sounds present x4  Extremities: warm, well perfused; gross edema b/l upper and lower extremities  Vascular: Pulses equal and strong throughout.   Neurological: AAOx3, no CN deficits, motor and sensory grossly intact throughout.   Skin: No gross skin abnormalities or rashes  Preadmit Karnofsky:  30%           Current Karnofsky:    20%  Cachexia (Y/N): N  BMI:    Advanced Directives:     DNR/DNI     MOLST in chart     DPOA     Living Will     Decision maker: HCP Mir Arango [phone]  Legal surrogate: HCP Mir Arango [phone]    GOALS OF CARE DISCUSSION       Palliative care info/counseling provided	           Family meeting       Advanced Directives addressed please see Advance Care Planning Note	           See previous Palliative Medicine Note       Documentation of GOC: 	          REFERRALS	        Palliative Med        Unit SW/Case Mgmt              Speech/Swallow       Patient/Family Support       Massage Therapy       Music Therapy       Hospice       Nutrition       Ethics       PT/OT MIR MARLEY          MRN-[id number]            (1928)    HPI:  89 y/o M with SSS s/p PPM, HTN, Glaucoma in both eyes (multiple surgeries at [facility]), pyogenic granuloma of eyelid s/p surgery, dementia, recent admission with MSSA bacteremia 2/2 discitis who presents from [facility] with increasing work of breathing and fluid overload. Patient is not able to provide much history, history obtained from chart. Patient has been getting progressively more hypoxic at , O2 was started and increased to 4LNC there. Work up there concerning for fluid overload so patient was sent to [facility] ED.    In the ED T99.2, HR90, BP96/60, RR20, 95% on 4LNC.. He was given Lasix 40 IVP. (07 May 2019 22:30)    Pt seen at bedside this morning was more awake and alert compared to yesterday per primary team. Pt was able to answer basic questions, but could not into many details. Pt denied any pain or difficulty breathing. Upon discussion with HCP, HCP made it clear that Pt would not have wanted this extensive workup. HCP would like to pursue an inpatient hospice course for the patient.      PAST MEDICAL & SURGICAL HISTORY:  Glaucoma  Dementia  RBBB (right bundle branch block with left anterior fascicular block)  HLD (hyperlipidemia)  Essential hypertension  S/P lumbar spinal fusion  H/O laminectomy      FAMILY HISTORY:   unavailable from patient or HCP    SOCIAL HISTORY: Pt is a former smoker, quit about 30 years ago. Pt rarely drank alcohol. Pt was a  in [place]. Lives alone- except that recently HCP has moved in with him to provide increased care  Has a niece in the area.  Brother lives in [place]     ROS:    Limited due to: Alzheimer's Disease                    Dyspnea (Monica 0-10):  7                     N/V (Y/N): N                             Secretions (Y/N): N                Agitation(Y/N): N  Pain (Y/N): denies     -Provocation/Palliation:  -Quality/Quantity:  -Radiating:  -Severity:  -Timing/Frequency:  -Impact on ADLs:    General:  Denied pain  HEENT:    Denied  Neck:  Denied  CVS:  Denied chest pain  Resp:  Denied respiratory distress but appears SOB  GI:  Denied n/v  :  Denied  Musc:  Denied  Neuro:  Denied  Psych:  Denied  Skin:  Denied  Lymph:  Denied    Allergies    No Known Allergies    Intolerances      Opiate Naive (Y/N): Y      Medications:      MEDICATIONS  (STANDING):  aspirin  chewable 81 milliGRAM(s) Oral daily  atorvastatin 20 milliGRAM(s) Oral at bedtime  ceFAZolin  Injectable. 2000 milliGRAM(s) IV Push every 8 hours  chlorhexidine 2% Cloths 1 Application(s) Topical <User Schedule>  docusate sodium 100 milliGRAM(s) Oral two times a day  donepezil 10 milliGRAM(s) Oral at bedtime  furosemide   Injectable 60 milliGRAM(s) IV Push every 12 hours  heparin  Injectable 5000 Unit(s) SubCutaneous every 8 hours  losartan 100 milliGRAM(s) Oral daily  rifampin 600 milliGRAM(s) Oral daily  senna 1 Tablet(s) Oral daily  simethicone 80 milliGRAM(s) Chew daily  timolol 0.5% Solution 1 Drop(s) Both EYES two times a day    MEDICATIONS  (PRN):  acetaminophen   Tablet .. 650 milliGRAM(s) Oral every 6 hours PRN Temp greater or equal to 38C (100.4F), Moderate Pain (4 - 6)  ALBUTerol/ipratropium for Nebulization 3 milliLiter(s) Nebulizer every 6 hours PRN Shortness of Breath and/or Wheezing      Labs:    CBC:                        11.4   9.21  )-----------( 268      ( 08 May 2019 05:40 )             36.2     CMP:        132<L>  |  90<L>  |  16  ----------------------------<  127<H>  4.5   |  35<H>  |  0.65    Ca    8.2<L>      08 May 2019 18:31  Mg     1.9         TPro  5.6<L>  /  Alb  2.3<L>  /  TBili  0.5  /  DBili  x   /  AST  29  /  ALT  11  /  AlkPhos  161<H>        Urinalysis Basic - ( 07 May 2019 17:08 )    Color: x / Appearance: x / SG: x / pH: x  Gluc: x / Ketone: x  / Bili: x / Urobili: x   Blood: x / Protein: x / Nitrite: x   Leuk Esterase: x / RBC: > 10 /HPF / WBC 5-10 /HPF   Sq Epi: x / Non Sq Epi: 5-10 /HPF / Bacteria: Present /HPF        Imaging:  Reviewed    PEx:  T(C): 37.1 (19 @ 04:45), Max: 37.4 (19 @ 22:09)  HR: 92 (19 @ 09:19) (81 - 102)  BP: 206/92 (19 @ 09:19) (139/74 - 206/92)  RR: 36 (19 @ 09:19) (18 - 36)  SpO2: 99% (19 @ 09:19) (95% - 99%)  Wt(kg): --  Daily     Daily Weight in k.4 (09 May 2019 04:45)  CAPILLARY BLOOD GLUCOSE        I&O's Summary    08 May 2019 07:01  -  09 May 2019 07:00  --------------------------------------------------------  IN: 100 mL / OUT: 2150 mL / NET: -2050 mL    09 May 2019 07:01  -  09 May 2019 12:37  --------------------------------------------------------  IN: 0 mL / OUT: 150 mL / NET: -150 mL        General: Elderly male with HFNC in place, anasarca present  HEENT: NC/AT, no conjunctival pallor or scleral icterus, MMM  Neck: Supple, no jvd  Respiratory: HFCN in place, bibasilar crackles heard  Cardiovascular: S1/S2, regular rhythm, tachycardic no murmurs heard.   Gastrointestinal: soft, nontender, nondistended, bowel sounds present x4  Extremities: warm, well perfused; gross edema b/l upper and lower extremities  Vascular: Pulses equal and strong throughout.   Neurological: AAOx3, no CN deficits, motor and sensory grossly intact throughout.   Skin: No gross skin abnormalities or rashes  Preadmit Karnofsky:  30%           Current Karnofsky:    20%  Cachexia (Y/N): N  BMI: not calculated    Advanced Directives:     DNR/DNI     MOLST in chart         Decision maker: HCP Mir Aranog [phone]  Legal surrogate: HCP Mir Arango [phone]    GOALS OF CARE DISCUSSION       Palliative care info/counseling provided	           Family meeting see advance care planning note       Advanced Directives addressed please see Advance Care Planning Note	                Documentation of GOC: 	Comfort at the end of life.  See advance care planning note below          REFERRALS	        Palliative Med        Unit SW/Case Mgmt              Speech/Swallow          Massage Therapy       Music Therapy       Hospice

## 2019-05-09 NOTE — CONSULT NOTE ADULT - CONSULT REASON
89y/o M with dementia, SSS, who presented from [facility] with increased work of breathing with admission complicated by aspiration event requiring escalation from nasal canula to HFNC. HCP commented he desired for patient to pass at home instead of in hospital.
Respiratory distress
MSSA BSI, discitis

## 2019-05-10 ENCOUNTER — TRANSCRIPTION ENCOUNTER (OUTPATIENT)
Age: 84
End: 2019-05-10

## 2019-05-10 VITALS
RESPIRATION RATE: 25 BRPM | TEMPERATURE: 99 F | SYSTOLIC BLOOD PRESSURE: 140 MMHG | OXYGEN SATURATION: 93 % | DIASTOLIC BLOOD PRESSURE: 60 MMHG

## 2019-05-10 PROCEDURE — 82570 ASSAY OF URINE CREATININE: CPT

## 2019-05-10 PROCEDURE — 85027 COMPLETE CBC AUTOMATED: CPT

## 2019-05-10 PROCEDURE — 71045 X-RAY EXAM CHEST 1 VIEW: CPT

## 2019-05-10 PROCEDURE — 84484 ASSAY OF TROPONIN QUANT: CPT

## 2019-05-10 PROCEDURE — P9047: CPT

## 2019-05-10 PROCEDURE — 82436 ASSAY OF URINE CHLORIDE: CPT

## 2019-05-10 PROCEDURE — 82340 ASSAY OF CALCIUM IN URINE: CPT

## 2019-05-10 PROCEDURE — 99239 HOSP IP/OBS DSCHRG MGMT >30: CPT

## 2019-05-10 PROCEDURE — 83735 ASSAY OF MAGNESIUM: CPT

## 2019-05-10 PROCEDURE — 93005 ELECTROCARDIOGRAM TRACING: CPT

## 2019-05-10 PROCEDURE — 85025 COMPLETE CBC W/AUTO DIFF WBC: CPT

## 2019-05-10 PROCEDURE — 74019 RADEX ABDOMEN 2 VIEWS: CPT

## 2019-05-10 PROCEDURE — 84300 ASSAY OF URINE SODIUM: CPT

## 2019-05-10 PROCEDURE — 94640 AIRWAY INHALATION TREATMENT: CPT

## 2019-05-10 PROCEDURE — 87086 URINE CULTURE/COLONY COUNT: CPT

## 2019-05-10 PROCEDURE — 99233 SBSQ HOSP IP/OBS HIGH 50: CPT | Mod: GC

## 2019-05-10 PROCEDURE — 84105 ASSAY OF URINE PHOSPHORUS: CPT

## 2019-05-10 PROCEDURE — 99285 EMERGENCY DEPT VISIT HI MDM: CPT | Mod: 25

## 2019-05-10 PROCEDURE — 36415 COLL VENOUS BLD VENIPUNCTURE: CPT

## 2019-05-10 PROCEDURE — 84540 ASSAY OF URINE/UREA-N: CPT

## 2019-05-10 PROCEDURE — 84133 ASSAY OF URINE POTASSIUM: CPT

## 2019-05-10 PROCEDURE — 83880 ASSAY OF NATRIURETIC PEPTIDE: CPT

## 2019-05-10 PROCEDURE — 80053 COMPREHEN METABOLIC PANEL: CPT

## 2019-05-10 PROCEDURE — 92526 ORAL FUNCTION THERAPY: CPT

## 2019-05-10 PROCEDURE — 83935 ASSAY OF URINE OSMOLALITY: CPT

## 2019-05-10 PROCEDURE — 81001 URINALYSIS AUTO W/SCOPE: CPT

## 2019-05-10 RX ORDER — MORPHINE SULFATE 50 MG/1
1 CAPSULE, EXTENDED RELEASE ORAL
Qty: 0 | Refills: 0 | DISCHARGE
Start: 2019-05-10

## 2019-05-10 RX ORDER — FUROSEMIDE 40 MG
60 TABLET ORAL DAILY
Refills: 0 | Status: DISCONTINUED | OUTPATIENT
Start: 2019-05-10 | End: 2019-05-10

## 2019-05-10 RX ORDER — GALANTAMINE HYDROBROMIDE 4 MG/1
1 TABLET, FILM COATED ORAL
Qty: 0 | Refills: 0 | DISCHARGE

## 2019-05-10 RX ORDER — FUROSEMIDE 40 MG
3 TABLET ORAL
Qty: 0 | Refills: 0 | DISCHARGE
Start: 2019-05-10

## 2019-05-10 RX ORDER — MORPHINE SULFATE 50 MG/1
1 CAPSULE, EXTENDED RELEASE ORAL
Refills: 0 | Status: DISCONTINUED | OUTPATIENT
Start: 2019-05-10 | End: 2019-05-10

## 2019-05-10 RX ADMIN — MORPHINE SULFATE 1 MILLIGRAM(S): 50 CAPSULE, EXTENDED RELEASE ORAL at 06:30

## 2019-05-10 RX ADMIN — MORPHINE SULFATE 1 MILLIGRAM(S): 50 CAPSULE, EXTENDED RELEASE ORAL at 12:45

## 2019-05-10 RX ADMIN — CHLORHEXIDINE GLUCONATE 1 APPLICATION(S): 213 SOLUTION TOPICAL at 06:32

## 2019-05-10 RX ADMIN — MORPHINE SULFATE 1 MILLIGRAM(S): 50 CAPSULE, EXTENDED RELEASE ORAL at 01:19

## 2019-05-10 RX ADMIN — MORPHINE SULFATE 1 MILLIGRAM(S): 50 CAPSULE, EXTENDED RELEASE ORAL at 05:47

## 2019-05-10 RX ADMIN — Medication 1 DROP(S): at 06:12

## 2019-05-10 RX ADMIN — Medication 60 MILLIGRAM(S): at 12:39

## 2019-05-10 RX ADMIN — MORPHINE SULFATE 1 MILLIGRAM(S): 50 CAPSULE, EXTENDED RELEASE ORAL at 01:45

## 2019-05-10 RX ADMIN — MORPHINE SULFATE 1 MILLIGRAM(S): 50 CAPSULE, EXTENDED RELEASE ORAL at 12:39

## 2019-05-10 NOTE — PROGRESS NOTE ADULT - ASSESSMENT
89M who presents with fluid overload status. Given that patient's previous echocardiogram showed normal function and he has hypoalbuminemia likelihood of CHF exacerbation is low. Most likely this is from decreased oncotic pressure. Patient has poor nutritional intake as per family friend at bedside but unclear if that would be only cause of hypoalbuminemia. Other liver synthetic function appears normal and no protein is observed in urine.    Pt to be discharged to [facility] hospice this afternoon. 89M who presents with fluid overload status. Given that patient's previous echocardiogram showed normal function and he has hypoalbuminemia likelihood of CHF exacerbation is low. Most likely this is from decreased oncotic pressure. Patient has poor nutritional intake as per family friend at bedside but unclear if that would be only cause of hypoalbuminemia. Other liver synthetic function appears normal and no protein is observed in urine.    Pt to be discharged to [facility] in-patient hospice this afternoon.

## 2019-05-10 NOTE — PROGRESS NOTE ADULT - PROBLEM SELECTOR PLAN 10
F: No IVF  E: Replete PRN  N: DASH/TLC + Ensure TID  P: HSQ  C: DNR/DNI  D: 5La
F: No IVF  E: Replete PRN  N: DASH/TLC + Ensure TID  P: HSQ  C: DNR/DNI  D: 5La

## 2019-05-10 NOTE — DISCHARGE NOTE NURSING/CASE MANAGEMENT/SOCIAL WORK - NSDCCRNAME_GEN_ALL_CORE_FT
[facility], AVEL KEBEDE, [address] [place] [address],  [place] [address]

## 2019-05-10 NOTE — PROGRESS NOTE ADULT - PROBLEM SELECTOR PLAN 3
After discussion with HCP, decision was made to discontinue antibiotics and have pt go to inpatient hospice care as HCP made it known that pt would not want to have an extensive treatment plan at this point in his disease course.   - to be discharged to [facility] hospice
Hypervolumic  -Diuresis  -BMP q12h  -Urine Lytes  -FeNa 2.16%
On 5/8 pt with witnessed aspiration event by HCP. Pt desaturated to 70s w/ increased work of breathing. Escalated from NC -> non-rebreather -> HFNC  - NPO pending s/s eval  - aspiration precautions  - wean oxygen as toelrated
On 5/8 pt with witnessed aspiration event by HCP. Pt desaturated to 70s w/ increased work of breathing. Escalated from NC -> non-rebreather -> HFNC. On 5/9 AM deescalated O2 back to nasal cannula, pt tolerating well.   - NPO pending s/s eval  - aspiration precautions  - continue to wean oxygen as tolerated

## 2019-05-10 NOTE — PROGRESS NOTE ADULT - PROBLEM SELECTOR PLAN 7
-Continue Timolol drops
On outpatient stimulant Galantamine (not on formulary)  - c/w donepezil 10mg QD
-Continue Timolol drops

## 2019-05-10 NOTE — PROGRESS NOTE ADULT - PROBLEM SELECTOR PROBLEM 2
Hypoalbuminemia due to protein-calorie malnutrition
Hypoalbuminemia due to protein-calorie malnutrition
Other hypervolemia
Hypoalbuminemia due to protein-calorie malnutrition

## 2019-05-10 NOTE — DISCHARGE NOTE PROVIDER - HOSPITAL COURSE
89 y/o M with SSS s/p PPM, HTN, Glaucoma in both eyes (multiple surgeries at [facility]), pyogenic granuloma of eyelid s/p surgery, dementia, recent admission with MSSA bacteremia 2/2 discitis who presents from [facility] with increasing work of breathing and fluid overload. Patient is not able to provide much history, history obtained from chart. Patient has been getting progressively more hypoxic at , O2 was started and increased to 4LNC there. Work up there concerning for fluid overload so patient was sent to [facility] ED. Pt was started on IV diuresis. While in the ED, was noted to have an aspiration event and put on high flow nasal cannula. Palliative care was consulted and pt was made comfort care/MEWS exempt. Antibiotics were stopped. Pt was started on morphine 1mg q2. High flow was deescalated to nasal cannula 5L. Due to lethargy, morphine was changed to 1mg q3h. Pt was deemed medically stable for discharge to hospice.

## 2019-05-10 NOTE — PROGRESS NOTE ADULT - PROBLEM SELECTOR PLAN 4
- Nutrition consult - NPO for now  - Supplement meals  - HCP does not want NGT placed for pt.   - to be discharged to [facility] hospice - Nutrition consult - NPO for now  - Comfort feeds as tolerated  - HCP does not want NGT placed for pt.   - to be discharged to [facility] hospice

## 2019-05-10 NOTE — PROGRESS NOTE ADULT - SUBJECTIVE AND OBJECTIVE BOX
Incomplete note    OVERNIGHT EVENTS:    SUBJECTIVE:    Vital Signs Last 12 Hrs  T(F): 97.8 (05-10-19 @ 05:19), Max: 98.9 (05-09-19 @ 20:57)  HR: 93 (05-10-19 @ 05:57) (90 - 100)  BP: 145/60 (05-10-19 @ 04:32) (145/60 - 198/62)  BP(mean): 88 (05-10-19 @ 04:32) (88 - 88)  RR: 25 (05-10-19 @ 05:57) (25 - 35)  SpO2: 93% (05-10-19 @ 05:57) (93% - 99%)  I&O's Summary    09 May 2019 07:01  -  10 May 2019 07:00  --------------------------------------------------------  IN: 0 mL / OUT: 450 mL / NET: -450 mL        PHYSICAL EXAM:  Constitutional: NAD, comfortable in bed.  HEENT: NC/AT, PERRLA, EOMI, no conjunctival pallor or scleral icterus, MMM  Neck: Supple, no JVD  Respiratory: Normal rate, rhythm, depth, effort. CTAB. No w/r/r.   Cardiovascular: RRR, normal S1 and S2, no m/r/g.   Gastrointestinal: +BS, soft NTND, no guarding or rebound tenderness, no palpable masses   Extremities: wwp; no cyanosis, clubbing or edema.   Vascular: Pulses equal and strong throughout.   Neurological: AAOx3, no CN deficits, strength and sensation intact throughout.   Skin: No gross skin abnormalities or rashes        LABS:    05-08    132<L>  |  90<L>  |  16  ----------------------------<  127<H>  4.5   |  35<H>  |  0.65    Ca    8.2<L>      08 May 2019 18:31    TPro  5.6<L>  /  Alb  2.3<L>  /  TBili  0.5  /  DBili  x   /  AST  29  /  ALT  11  /  AlkPhos  161<H>  05-08          RADIOLOGY & ADDITIONAL TESTS:    MEDICATIONS  (STANDING):  chlorhexidine 2% Cloths 1 Application(s) Topical <User Schedule>  furosemide   Injectable 60 milliGRAM(s) IV Push every 12 hours  morphine  - Injectable 1 milliGRAM(s) IV Push every 2 hours  timolol 0.5% Solution 1 Drop(s) Both EYES two times a day    MEDICATIONS  (PRN):  ALBUTerol/ipratropium for Nebulization 3 milliLiter(s) Nebulizer every 6 hours PRN Shortness of Breath and/or Wheezing  morphine  - Injectable 1 milliGRAM(s) IV Push every 1 hour PRN Breakthrough Respiratory Distress OVERNIGHT EVENTS: PO    SUBJECTIVE: Pt seen and examined at bedside. Somnolent but arousable, no acute complaints.    Vital Signs Last 12 Hrs  T(F): 97.8 (05-10-19 @ 05:19), Max: 98.9 (05-09-19 @ 20:57)  HR: 93 (05-10-19 @ 05:57) (90 - 100)  BP: 145/60 (05-10-19 @ 04:32) (145/60 - 198/62)  BP(mean): 88 (05-10-19 @ 04:32) (88 - 88)  RR: 25 (05-10-19 @ 05:57) (25 - 35)  SpO2: 93% (05-10-19 @ 05:57) (93% - 99%)  I&O's Summary    09 May 2019 07:01  -  10 May 2019 07:00  --------------------------------------------------------  IN: 0 mL / OUT: 450 mL / NET: -450 mL        PHYSICAL EXAM:  Constitutional: Elderly Male, laying in bed, grossly anasarcic, NAD, nasal cannula in place  HEENT: NC/AT, PERRLA, EOMI, no conjunctival pallor or scleral icterus, MMM  Neck: Supple, no JVD  Respiratory: Poor inspiratory effort, decreased breath sounds b/l bases to mid lung field.  Cardiovascular: RRR, normal S1 and S2, no m/r/g.   Gastrointestinal: +BS, distended, nontender, no organomegaly noted  Extremities: wwp; gross edema b/l upper and lower extremities  Vascular: Pulses equal and strong throughout.   Neurological: AAOx1 (name), no CN deficits, strength and sensation intact throughout.   Skin: No gross skin abnormalities or rashes        LABS:    05-08    132<L>  |  90<L>  |  16  ----------------------------<  127<H>  4.5   |  35<H>  |  0.65    Ca    8.2<L>      08 May 2019 18:31    TPro  5.6<L>  /  Alb  2.3<L>  /  TBili  0.5  /  DBili  x   /  AST  29  /  ALT  11  /  AlkPhos  161<H>  05-08          RADIOLOGY & ADDITIONAL TESTS:    MEDICATIONS  (STANDING):  chlorhexidine 2% Cloths 1 Application(s) Topical <User Schedule>  furosemide   Injectable 60 milliGRAM(s) IV Push every 12 hours  morphine  - Injectable 1 milliGRAM(s) IV Push every 2 hours  timolol 0.5% Solution 1 Drop(s) Both EYES two times a day    MEDICATIONS  (PRN):  ALBUTerol/ipratropium for Nebulization 3 milliLiter(s) Nebulizer every 6 hours PRN Shortness of Breath and/or Wheezing  morphine  - Injectable 1 milliGRAM(s) IV Push every 1 hour PRN Breakthrough Respiratory Distress

## 2019-05-10 NOTE — PROGRESS NOTE ADULT - PROBLEM SELECTOR PLAN 8
-Continue Atorvastatin
On outpatient stimulant Galantamine (not on formulary)  - c/w donepezil 10mg QD
On outpatient stimulant Galantamine (not on formulary)  - c/w donepezil 10mg QD

## 2019-05-10 NOTE — PROGRESS NOTE ADULT - PROBLEM SELECTOR PROBLEM 5
Essential hypertension
Goals of care, counseling/discussion
Palliative care by specialist
Goals of care, counseling/discussion

## 2019-05-10 NOTE — PROGRESS NOTE ADULT - PROBLEM SELECTOR PLAN 4
w/ MSSA bacteremia on previous admission  - Discontinued ABx per goals of life discussion and comfort care

## 2019-05-10 NOTE — DISCHARGE NOTE NURSING/CASE MANAGEMENT/SOCIAL WORK - NSDCDPATPORTLINK_GEN_ALL_CORE
You can access the [facility] Patient Portal, offered by [facility], by registering with the following website: [email or web address]

## 2019-05-10 NOTE — PROGRESS NOTE ADULT - PROBLEM SELECTOR PLAN 5
-Hold Norvasc  -Continue losartan 100mg
Palliative care consulted, pending discussion with PCP for elucidation of goals of care.   - If comfort measures are pursued, can switch to IV ABx for suppressive therapy
Support provided to patient and family. Patient to have access to supportive services during rest of hospital stay as the patient/family deemed necessary ie. Chaplaincy, Massage therapy, Music therapy, Patient and family supportive services, Palliative SW, etc.
Palliative care consulted, pt made comfort care, MEWS exempt  - pending hospice placement

## 2019-05-10 NOTE — DISCHARGE NOTE PROVIDER - CARE PROVIDER_API CALL
Becki Garduno)  Internal Medicine  [address], [address] [place] [address], [place] [address]  Phone: [phone]  Fax: [phone]  Follow Up Time:

## 2019-05-10 NOTE — PROGRESS NOTE ADULT - PROBLEM SELECTOR PROBLEM 4
Discitis, unspecified spinal region
Discitis, unspecified spinal region
Hypoalbuminemia due to protein-calorie malnutrition
Discitis, unspecified spinal region

## 2019-05-10 NOTE — PROGRESS NOTE ADULT - PROBLEM SELECTOR PLAN 9
-Continue Atorvastatin
F: No IVF  E: Replete PRN  N: DASH/TLC + Ensure TID  P: HSQ  C: DNR/DNI  D: 5La
-Continue Atorvastatin

## 2019-05-10 NOTE — PROGRESS NOTE ADULT - NSHPATTENDINGPLANDISCUSS_GEN_ALL_CORE
Dr. Garduno, [facility]
5LA cardiac team
the patient, Palliative attending Dr. Price, [facility] cardiac team

## 2019-05-10 NOTE — PROGRESS NOTE ADULT - PROBLEM SELECTOR PLAN 1
-Echocardiogram in AM  -lasix 40mg IV BID w/ albumin 25% 50cc until albumin >2.5  -Likely the cause of hyponatremia, will continue to monitor sodium level  -Will hold Norvasc in setting of diffuse edema, unlikely to be cause but could be contributing  -Will need good BP control, Sys<130mmHg to aid in keeping hydrostatic pressures down
-f/u Echocardiogram  -lasix 40mg IV BID w/ albumin 25% 50cc until albumin >2.5  -Likely the cause of hyponatremia, will continue to monitor sodium level  -Will hold Norvasc in setting of diffuse edema, unlikely to be cause but could be contributing
Pt weaned off of HFNC to NC so that he will be able to go to [facility] hospice  - recommend Morphine IV 1mg q2hr and q1hr PRN breakthrough for respiratory distress.  - c/w NC and pt is to be discharged to [facility] this afternoon.
-f/u Echocardiogram  -lasix 40mg IV BID w/ albumin 25% 50cc until albumin >2.5  -Likely the cause of hyponatremia, will continue to monitor sodium level  -Will hold Norvasc in setting of diffuse edema, unlikely to be cause but could be contributing

## 2019-05-10 NOTE — PROGRESS NOTE ADULT - PROBLEM SELECTOR PLAN 2
- recommend Morphine IV 1mg q2hr and q1hr PRN breakthrough for respiratory distress.  - to be discharged to [facility] hospice
-See above  -Nutrition consult  -Supplement meals  -Appetite stimulant may be needed
-See above  -Nutrition consulted  -Supplement meals    #Hyponatremia  Hypervolumic  -Diuresis  -BMP q12h  -Urine Lytes  -FeNa 2.16%
-See above  -Nutrition consulted  -Supplement meals    #Hyponatremia  Hypervolumic  -Diuresis  -BMP q12h  -Urine Lytes  -FeNa 2.16%

## 2019-05-10 NOTE — PROGRESS NOTE ADULT - SUBJECTIVE AND OBJECTIVE BOX
MIR MARLEY             MRN-[id number]    CC: Fluid overload    HPI:  89M with SSS s/p PPM, HTN, Glaucoma in both eyes (multiple surgeries at [facility]), pyogenic granuloma of eyelid s/p surgery, dementia, recent admission with MSSA bacteremia 2/2 discitis who presents from [facility] with increasing work of breathing and fluid overload. Patient is not able to provide much history, history obtained from chart. Patient has been getting progressively more hypoxic at , O2 was started and increased to 4LNC there. Work up there concerning for fluid overload so patient was sent to [facility] ED.    He currently denies any complaints.     In the ED T99.2, HR90, BP96/60, RR20, 95% on 4LNC.. He was given Lasix 40 IVP. (07 May 2019 22:30)    SUBJECTIVE: Pt seen at bedside. Pt intermittently opening eyes, but is much less responsive than yesterday. HFNC is off and pt is just on NC. Pt denies pain at this time. Pt accepted to [facility] hospice and will be going there at 1pm.    ROS:  DYSPNEA: Y	  NAUS/VOM:  N	  SECRETIONS: N	  AGITATION:  N  Pain (Y/N):  denies pain     -Provocation/Palliation: n/a  -Quality/Quantity: n/a  -Radiating: n/a  -Severity: n/a  -Timing/Frequency: n/a  -Impact on ADLs: n/a    OTHER REVIEW OF SYSTEMS:  UNABLE TO OBTAIN  due to: decreased responsiveness by patient.    PEx:  T(C): 37.2 (05-10-19 @ 09:30), Max: 37.2 (05-09-19 @ 20:57)  HR: 92 (05-10-19 @ 09:09) (88 - 105)  BP: 145/60 (05-10-19 @ 04:32) (107/51 - 198/62)  RR: 25 (05-10-19 @ 05:57) (24 - 35)  SpO2: 93% (05-10-19 @ 09:09) (93% - 99%)  Wt(kg): --    General: Elderly male with NC in place, anasarca throughout  HEENT: NC/AT, no conjunctival pallor or scleral icterus, MMM  Neck: Supple, no JVD present  Respiratory: rhonchi throughout  Cardiovascular: S1/S2, regular rhythm, tachycardic no m/r/g   Gastrointestinal: soft, nontender, nondistended, normoactive bowel sounds  Extremities: warm, well perfused; gross edema BUE and BLE  Vascular: Pulses equal throughout.   Neurological: pt more lethargic today, unable to answer questions  Skin: No gross skin abnormalities or rashes  Preadmit Karnofsky:  30%           Current Karnofsky:    20%  Cachexia (Y/N): N      	     ALLERGIES: No Known Allergies      OPIATE NAÏVE (Y/N): Y    MEDICATIONS: REVIEWED  MEDICATIONS  (STANDING):  chlorhexidine 2% Cloths 1 Application(s) Topical <User Schedule>  furosemide    Tablet 60 milliGRAM(s) Oral daily  morphine  - Injectable 1 milliGRAM(s) IV Push every 3 hours  timolol 0.5% Solution 1 Drop(s) Both EYES two times a day    MEDICATIONS  (PRN):  ALBUTerol/ipratropium for Nebulization 3 milliLiter(s) Nebulizer every 6 hours PRN Shortness of Breath and/or Wheezing  morphine  - Injectable 1 milliGRAM(s) IV Push every 1 hour PRN Breakthrough Respiratory Distress      LABS: REVIEWED  CBC:    CMP:    05-08    132<L>  |  90<L>  |  16  ----------------------------<  127<H>  4.5   |  35<H>  |  0.65    Ca    8.2<L>      08 May 2019 18:31    TPro  5.6<L>  /  Alb  2.3<L>  /  TBili  0.5  /  DBili  x   /  AST  29  /  ALT  11  /  AlkPhos  161<H>  05-08      IMAGING: REVIEWED    ADVANCED DIRECTIVES:       DNR/DNI     MOLST in chart    Decision maker: MANJEET Arango [phone]  Legal surrogate: HCP Mir Arango [phone]    PSYCHOSOCIAL-SPIRITUAL ASSESSMENT:       Reviewed       Care plan unchanged       Care plan adjusted as above	    GOALS OF CARE DISCUSSION         Palliative care info/counseling provided	           Family meeting see advance care planning note       Advanced Directives addressed please see Advance Care Planning Note    AGENCY CHOICE DISCUSSED:           [facility] hospice    REFERRALS	        Palliative Med        Unit SW/Case Mgmt             Speech/Swallow             Massage Therapy       Music Therapy       Hospice MIR MARLEY             [id number]    CC: Fluid overload    HPI:  89M with SSS s/p PPM, HTN, Glaucoma in both eyes (multiple surgeries at [facility]), pyogenic granuloma of eyelid s/p surgery, dementia, recent admission with MSSA bacteremia 2/2 discitis who presents from [facility] with increasing work of breathing and fluid overload. Patient is not able to provide much history, history obtained from chart. Patient has been getting progressively more hypoxic at , O2 was started and increased to 4LNC there. Work up there concerning for fluid overload so patient was sent to [facility] ED.    He currently denies any complaints.     In the ED T99.2, HR90, BP96/60, RR20, 95% on 4LNC.. He was given Lasix 40 IVP. (07 May 2019 22:30)    SUBJECTIVE: Pt seen at bedside. Pt intermittently opening eyes, but is much less responsive than yesterday. HFNC is off and pt is just on NC. Pt denies pain at this time. Pt accepted to [facility] hospice and will be going there at 1pm. Intermittently responsive.  Told Dr. Garduno that he didn't need anything  HCP at bedside    ROS:  DYSPNEA: Y but can't quantify severity	  NAUS/VOM:  N	  SECRETIONS: N	  AGITATION:  N  Pain (Y/N):  denies pain     -Provocation/Palliation: n/a  -Quality/Quantity: n/a  -Radiating: n/a  -Severity: n/a  -Timing/Frequency: n/a  -Impact on ADLs: n/a    OTHER REVIEW OF SYSTEMS:  UNABLE TO OBTAIN  due to: decreased responsiveness by patient.    PEx:  T(C): 37.2 (05-10-19 @ 09:30), Max: 37.2 (05-09-19 @ 20:57)  HR: 92 (05-10-19 @ 09:09) (88 - 105)  BP: 145/60 (05-10-19 @ 04:32) (107/51 - 198/62)  RR: 25 (05-10-19 @ 05:57) (24 - 35)  SpO2: 93% (05-10-19 @ 09:09) (93% - 99%)  Wt(kg): --    General: Elderly male with NC in place, anasarca throughout  HEENT: NC/AT, no conjunctival pallor or scleral icterus, MMM    Neck: Supple, no JVD present  Respiratory: rhonchi throughout  Cardiovascular: S1/S2, regular rhythm, tachycardic no m/r/g   Gastrointestinal: soft, nontender, nondistended, normoactive bowel sounds  Extremities: warm, well perfused; gross edema BUE and BLE  Vascular: Pulses equal throughout.   Neurological: pt more lethargic today, unable to answer questions  Skin: No gross skin abnormalities or rashes  Preadmit Karnofsky:  30%           Current Karnofsky:    20%  Cachexia (Y/N): N      	     ALLERGIES: No Known Allergies      OPIATE NAÏVE (Y/N): Y    MEDICATIONS: REVIEWED  MEDICATIONS  (STANDING):  chlorhexidine 2% Cloths 1 Application(s) Topical <User Schedule>  furosemide    Tablet 60 milliGRAM(s) Oral daily  morphine  - Injectable 1 milliGRAM(s) IV Push every 3 hours  timolol 0.5% Solution 1 Drop(s) Both EYES two times a day    MEDICATIONS  (PRN):  ALBUTerol/ipratropium for Nebulization 3 milliLiter(s) Nebulizer every 6 hours PRN Shortness of Breath and/or Wheezing  morphine  - Injectable 1 milliGRAM(s) IV Push every 1 hour PRN Breakthrough Respiratory Distress      LABS: REVIEWED  CBC:    CMP:    05-08    132<L>  |  90<L>  |  16  ----------------------------<  127<H>  4.5   |  35<H>  |  0.65    Ca    8.2<L>      08 May 2019 18:31    TPro  5.6<L>  /  Alb  2.3<L>  /  TBili  0.5  /  DBili  x   /  AST  29  /  ALT  11  /  AlkPhos  161<H>  05-08      IMAGING: REVIEWED    ADVANCED DIRECTIVES:       DNR/DNI     MOLST in chart    Decision maker: MANJEET Arango [phone]  Legal surrogate: HCP Mir Arango [phone]    PSYCHOSOCIAL-SPIRITUAL ASSESSMENT:       Reviewed       Care plan unchanged       Care plan adjusted as above	    GOALS OF CARE DISCUSSION         Palliative care info/counseling provided	           Family meeting see advance care planning note       Advanced Directives addressed please see Advance Care Planning Note below       GOC: comfort at the EOL    AGENCY CHOICE DISCUSSED:           [facility] in-patient hospice    REFERRALS	        Palliative Med        Unit SW/Case Mgmt             Speech/Swallow             Massage Therapy       Music Therapy       Hospice

## 2019-05-10 NOTE — PROGRESS NOTE ADULT - PROBLEM SELECTOR PLAN 6
-Continue Timolol drops
-Hold Norvasc  -Continue losartan 100mg    #Urinary retention  Pt admitted with dias from rehab. Placed last admission for retention. Dias removed 5/9.  - f/u TOV  - urology consult if evidence of retention
A face to face meeting to discuss advance care planning was held yesterday regarding: MIR MARLEY  Primary decision maker: Mir Arango  Alternate/surrogate:  Discussed advance directives including, but not limited to, healthcare proxy and code status.  Decision regarding code status: DNR/DNI  Documentation completed today:    Pt with MOLST and HCP in chart  - DNR/DNI  - HCP, Mir Arango, [phone]  - Meeting with HCP, Mir Arango, was held and he made it clear that the pt would not want to have an extensive workup and that he would like to pursue an inpatient hospice admission for the continuation of care.  - abx stopped  - oral medications stopped  - no escalation of care.  - to be discharged to [facility] hospice this afternoon.
-Hold Norvasc  -Continue losartan 100mg    #Urinary retention  Pt admitted with dias from rehab. Placed last admission for retention. Dias removed 5/9.  - f/u TOV  - urology consult if evidence of retention

## 2019-05-10 NOTE — DISCHARGE NOTE PROVIDER - NSDCCPCAREPLAN_GEN_ALL_CORE_FT
PRINCIPAL DISCHARGE DIAGNOSIS  Diagnosis: Other hypervolemia  Assessment and Plan of Treatment: You came to the hospital due to concern for volume overload. A Chest Xray did show evidence of volume overload. You were started on IV diuertics to help get rid of excess fluid. You will continue to receive the diuretics, but you will be receiving them by mouth.      SECONDARY DISCHARGE DIAGNOSES  Diagnosis: Goals of care, counseling/discussion  Assessment and Plan of Treatment: During your hospitalization, you were evaluated by the palliative care team to discuss goals of care, and it was decided that the goals of care were comfort. You will continue to receive care in hospice.    Diagnosis: Aspiration into airway, initial encounter  Assessment and Plan of Treatment: During your hospitalization, we witnessed an aspiration event, in which food went in to your lungs. We suctioned out some of the food and put you on increased oxygen support for a short period of time. Your breathing improved and you required less oxygen support.

## 2019-05-10 NOTE — PROGRESS NOTE ADULT - PROBLEM SELECTOR PROBLEM 8
Alzheimer's dementia without behavioral disturbance, unspecified timing of dementia onset
HLD (hyperlipidemia)
Alzheimer's dementia without behavioral disturbance, unspecified timing of dementia onset

## 2019-05-10 NOTE — PROGRESS NOTE ADULT - PROBLEM SELECTOR PROBLEM 3
Aspiration into airway, initial encounter
Discitis, unspecified spinal region
Hyponatremia
Aspiration into airway, initial encounter

## 2019-05-20 ENCOUNTER — MOBILE ON CALL (OUTPATIENT)
Age: 84
End: 2019-05-20

## 2019-05-20 NOTE — PROGRESS NOTE ADULT - PROBLEM/PLAN-3
LOV: 3/25/19 w/ AD acute, 1/14/19 w/ SD for CPE  FOV: None  Last labs: 1/11/19 TSH,LIPID,CMP,CBC,LIPID  Last Refill: 3/8/19 qt:90    Per protocol routed to provider
DISPLAY PLAN FREE TEXT

## 2019-05-21 ENCOUNTER — APPOINTMENT (OUTPATIENT)
Dept: HEART AND VASCULAR | Facility: CLINIC | Age: 84
End: 2019-05-21

## 2019-05-22 DIAGNOSIS — Z98.890 OTHER SPECIFIED POSTPROCEDURAL STATES: ICD-10-CM

## 2019-05-22 DIAGNOSIS — Z51.5 ENCOUNTER FOR PALLIATIVE CARE: ICD-10-CM

## 2019-05-22 DIAGNOSIS — I45.10 UNSPECIFIED RIGHT BUNDLE-BRANCH BLOCK: ICD-10-CM

## 2019-05-22 DIAGNOSIS — M46.40 DISCITIS, UNSPECIFIED, SITE UNSPECIFIED: ICD-10-CM

## 2019-05-22 DIAGNOSIS — I50.9 HEART FAILURE, UNSPECIFIED: ICD-10-CM

## 2019-05-22 DIAGNOSIS — T17.908A UNSPECIFIED FOREIGN BODY IN RESPIRATORY TRACT, PART UNSPECIFIED CAUSING OTHER INJURY, INITIAL ENCOUNTER: ICD-10-CM

## 2019-05-22 DIAGNOSIS — Z95.0 PRESENCE OF CARDIAC PACEMAKER: ICD-10-CM

## 2019-05-22 DIAGNOSIS — G30.9 ALZHEIMER'S DISEASE, UNSPECIFIED: ICD-10-CM

## 2019-05-22 DIAGNOSIS — R33.9 RETENTION OF URINE, UNSPECIFIED: ICD-10-CM

## 2019-05-22 DIAGNOSIS — E78.5 HYPERLIPIDEMIA, UNSPECIFIED: ICD-10-CM
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
## 2019-05-22 DIAGNOSIS — E87.1 HYPO-OSMOLALITY AND HYPONATREMIA: ICD-10-CM

## 2019-05-22 DIAGNOSIS — Z66 DO NOT RESUSCITATE: ICD-10-CM

## 2019-05-22 DIAGNOSIS — Z98.1 ARTHRODESIS STATUS: ICD-10-CM

## 2019-05-22 DIAGNOSIS — R06.02 SHORTNESS OF BREATH: ICD-10-CM

## 2019-05-22 DIAGNOSIS — J96.20 ACUTE AND CHRONIC RESPIRATORY FAILURE, UNSPECIFIED WHETHER WITH HYPOXIA OR HYPERCAPNIA: ICD-10-CM

## 2019-05-22 DIAGNOSIS — I11.0 HYPERTENSIVE HEART DISEASE WITH HEART FAILURE: ICD-10-CM

## 2019-05-22 DIAGNOSIS — E88.09 OTHER DISORDERS OF PLASMA-PROTEIN METABOLISM, NOT ELSEWHERE CLASSIFIED: ICD-10-CM

## 2019-05-22 DIAGNOSIS — E46 UNSPECIFIED PROTEIN-CALORIE MALNUTRITION: ICD-10-CM

## 2019-05-22 DIAGNOSIS — F02.80 DEMENTIA IN OTHER DISEASES CLASSIFIED ELSEWHERE, UNSPECIFIED SEVERITY, WITHOUT BEHAVIORAL DISTURBANCE, PSYCHOTIC DISTURBANCE, MOOD DISTURBANCE, AND ANXIETY: ICD-10-CM

## 2019-05-22 DIAGNOSIS — R19.7 DIARRHEA, UNSPECIFIED: ICD-10-CM

## 2019-05-22 DIAGNOSIS — H40.9 UNSPECIFIED GLAUCOMA: ICD-10-CM

## 2019-05-23 ENCOUNTER — APPOINTMENT (OUTPATIENT)
Dept: HEART AND VASCULAR | Facility: CLINIC | Age: 84
End: 2019-05-23

## 2019-09-05 NOTE — PATIENT PROFILE ADULT - NSALCOHOLFREQ_GEN_A_NUR
"Problem: Patient Care Overview  Goal: Plan of Care Review  Outcome: Ongoing (interventions implemented as appropriate)   09/05/19 4455   Coping/Psychosocial   Plan of Care Reviewed With patient;daughter   OTHER   Outcome Summary pt incr activity but normally assist of 1 or less at home per dtr; dtr wanting pt to incr amb dist today, but pt could not saying \"the pain is taking my breath away\"; pre-meds for PT and pt still too painful         " monthly or less

## 2019-09-12 ENCOUNTER — APPOINTMENT (OUTPATIENT)
Dept: HEART AND VASCULAR | Facility: CLINIC | Age: 84
End: 2019-09-12

## 2020-06-17 NOTE — HISTORY OF PRESENT ILLNESS
Magnesium supplements at bedtime.  Tumeric Root extract daily for joints.   [FreeTextEntry1] : 89 year male who acknowledges worsening memory. He was seen by Dr Tong and is on a medication for Alzheimers. He saw Dr Larsen and is on intermittent Lupron

## 2020-08-19 NOTE — ED ADULT NURSE NOTE - PMH
Essential hypertension    HLD (hyperlipidemia)    RBBB (right bundle branch block with left anterior fascicular block) Cheek-To-Nose Interpolation Flap Text: A decision was made to reconstruct the defect utilizing an interpolation axial flap and a staged reconstruction. A telfa template was made of the defect. This telfa template was then used to outline the Cheek-To-Nose Interpolation flap. The donor area for the pedicle flap was then injected with anesthesia. The flap was excised through the skin and subcutaneous tissue down to the layer of the underlying musculature. The interpolation flap was carefully excised within this deep plane to maintain its blood supply. The edges of the donor site were undermined. The donor site was closed in a primary fashion. The pedicle was then rotated into position and sutured. Once the tube was sutured into place, adequate blood supply was confirmed with blanching and refill. The pedicle was then wrapped with xeroform gauze and dressed appropriately with a telfa and gauze bandage to ensure continued blood supply and protect the attached pedicle.

## 2021-01-19 NOTE — PROGRESS NOTE ADULT - PROBLEM/PLAN-7
DISPLAY PLAN FREE TEXT
1/week(s)
DISPLAY PLAN FREE TEXT

## 2021-03-23 NOTE — PATIENT PROFILE ADULT. - NS PRO TALK SOMEONE YN
no Cheek-To-Nose Interpolation Flap Text: A decision was made to reconstruct the defect utilizing an interpolation axial flap and a staged reconstruction.  A telfa template was made of the defect.  This telfa template was then used to outline the Cheek-To-Nose Interpolation flap.  The donor area for the pedicle flap was then injected with anesthesia.  The flap was excised through the skin and subcutaneous tissue down to the layer of the underlying musculature.  The interpolation flap was carefully excised within this deep plane to maintain its blood supply.  The edges of the donor site were undermined.   The donor site was closed in a primary fashion.  The pedicle was then rotated into position and sutured.  Once the tube was sutured into place, adequate blood supply was confirmed with blanching and refill.  The pedicle was then wrapped with xeroform gauze and dressed appropriately with a telfa and gauze bandage to ensure continued blood supply and protect the attached pedicle.

## 2022-02-26 NOTE — PROGRESS NOTE ADULT - ATTENDING COMMENTS
89 yo male with PPM, L4-S1 PSF, L2-S1 laminectomy > 10 years ago, p/w LBP, found to have T12-L1 discitis and MSSA BSI. f/u surveillance bcx. TTE. Spine bx deferred given established diagnosis of Staph aureus infection. f/u susceptibilities--if susceptible, will add rifampin. Continue cefazolin.
I have reviewed the medical record, including laboratory and radiographic studies, interviewed and examined the patient and discussed the plan with Dr. Trejo, the ID Resident.  Agree with above. His blood culture from 4/16 did not grow Staph until 4/20.  He was last febrile on 4/20 and still has leukocytosis.  Would not place PICC until culture (hopefully from 4/20) is negative for 5 d.  Please recall if further ID input is desired – ID Team 1.
Fever, bandemia, surveillance bcx 4/15 positive with [facility]. Defer PICC at this time. Gallium scan to assess for PPM involvement and other possible metastatic foci. Continue cefazolin + rifampin.   Dr. Mehta to assume care Thursday 4/18
I have reviewed the medical record, including laboratory and radiographic studies, interviewed and examined the patient and discussed the plan with Dr. Trejo, the ID Resident.  Agree with above. Will continue to follow with you – ID Team 1.
I have reviewed the medical record, including laboratory and radiographic studies, interviewed and examined the patient and discussed the plan with Dr. Trejo, the ID Resident.  Agree with above.  His blood cultures from 4/16 are not growing.  He will need AT LEAST 6 weeks of IV therapy and likely suppressive therapy thereafter.  When his disposition is known, please contact us to arrange f/u with Dr. Ann.  Otherwise, please recall if further ID input is desired – ID Team 1.
In addition to the above, PICC may be placed tomorrow if surveillance bcx 4/15 remains negative. Patient to need SYLVIE given debility and inability to perform self-infusion at home. Will arrange outpatient f/u with me in 2-3 weeks.  Please reconsult with ?  ID Team 1
Agree with evaluation. The patient is not in any distress. He is adequately oxygenating. He is responding to the current regimen. His CXR from 4/20/19 is rotated. There are B/L effusions with a permanent pacemaker in place. There is no indication to do thoracentesis at this stage.
Acute hypoxic respiratory failure secondary to pnuemonia/atelectasis/pleural effusion. Tachypnea improved. Room air saturation was 96% (90%, one week back). Continue incentive spirometry, OOB to chair, Physical therapy. Prn diuresis. Rest as above.
89yo M with a PMHx of SSS s/p PPM with dementia with MSSA bacteremia from T12-L1 epidural abscess and discitis (on rifampin and cefazolin) with acute hypoxic respiratory failure. Bedside lung ultrasound showed b/l pleural effusion (improved with diuresis, around 3 liter negative) and b/l atelectasis.  Plan:  - Strict Input/ouput  - Diuresis prn to keep input output zero  - OOB to chair, ambulation  - Oxygen titration for saturation above 92%.
Patient was seen and examined with the resident team today.  I agree with Dr. Adams's assessment and plan with the following exceptions/additions:     Briefly, this is a 89yo gentleman with a PMH of mild dementia, SSS s/p PPM, HTN, bilateral glaucoma and pyogenic granuloma of eyelid s/p surgery who p/w BLE weakness, back pain and infectious encephalopathy i/s/o severe sepsis 2/2 MSSA bacteremia c/b epidural abscess and T12-L1 discitis.  NAEO; however, mixed reports about soft v formed stools/diarrhea.  AXR yesterday with ?ileus.  VS - RR 19-24.  Exam - appears more comfortably today and speaking in relatively full sentences on NC, euvolemic, abdomen soft NTND, AOx3.  Labs - WBC 23.01.    -- BCx from 4/16 now positive (as of 4/20) --> surveillence BCx today (ultimately will need PICC)  -- c/w Cefazolin/Rifampin thru 5/26  -- SUSANA discussions ongoing   -- if stool is confirmed diarrhea and copious, may need C. diff PCR  -- ID following, appreciate assistance  -- c/w Musa for now   -- strongly encourage IS use given tachypnea likely related to atelectasis  --  daily PT  -- ID, Pulm and Ortho following, appreciate assistance  -- DVT PPx - SQH  -- Dispo - still medically active    Zahida Campo  [phone]
Patient was seen and examined with the resident team today.  I agree with Dr. Adams's assessment and plan with the following exceptions/additions:     Briefly, this is a 89yo gentleman with a PMH of mild dementia, SSS s/p PPM, HTN, bilateral glaucoma and pyogenic granuloma of eyelid s/p surgery who p/w BLE weakness, back pain and infectious encephalopathy i/s/o severe sepsis 2/2 MSSA bacteremia c/b epidural abscess and T12-L1 discitis. NAEO.  Asking when he can leave and reporting loose (but no diarrhea) BMs.  VS - afebrile, RR 10-20's.  Exam - NAD, eating breakfast, on NC 2L and RA w/o change, NCAT, MMM, clear OP, bibasilar crackles w/mild tachypnea, RRR no m/g/r, +BS semi-firm and distended but nontender, WWP no c/c/e, AOx3 but hard of hearing, pleasant and conversant.  Labs - WBC 22.72, BMP unchanged, Albumin 2.1, LFTs okay.      -- Gallium consistent w/known abscess/discitis   -- c/w Cefazolin/Rifampin thru 5/26  -- SUSANA discussions ongoing   -- BCx negative since 4/16; will need PICC  -- AXR given abdominal distension; if loose BM's , would consider C. diff PCR   -- c/w Musa for now   -- strongly encourage IS use given tachypnea likely related to atelectasis  --  daily PT  -- ID, Pulm and Ortho following, appreciate assistance  -- DVT PPx - SQH  -- Dispo - SYLVIE, still waiting medical clearance    Zahida Campo  [phone]
Pt seen and examined at bedside. Agree with exam, a/p as above with following addendum/edits:    No complaints, states he is lousy which is his usual answer. Was unable to get out of bed yesterday with PT, will attempt again today. Denies any pain. Unable to tell me about bowel movements.     89 year old M w/     1. Severe sepsis 2/2 MSSA bacteremia 2/2 discitis and epidural abscess: repeat cultures negative at 3 days, c/w cefazolin and rifampin  2. Metabolic encephalopathy: resolved, back to mental baseline per HS  3. Rhabdo w/ transaminitis: resolved  4. Hypoalbuminemia:  5. B/L pleural effusion: off O2  6. FINESSE, likely pre-renal: resolved  7. Urinary retention: failed TOV, c/w dias, will need urology f/u outpatient, will d/c w/ dias  8. HTN: improved with addition of losartan  9. SSS w/ PPM  10. Mild dementia  11. DIspo: SYLVIE when medically ready, likely Thursday after PICC, will need ID and urology f/u
Pt seen and examined at bedside. Agree with exam, a/p as above with following addendum/edits:    No complaints, able to tell me he is in the hospital for back infection. Otherwise, had breakfast. ID recommends waiting 5 days of negative cultures to place PICC, which will be Thursday.     89 year old M w/     1. Severe sepsis 2/2 MSSA bacteremia: persistent bacteremia, source of discitis and epidural abscess, continue to follow cultures, c/w cefazolin and rifampin  2. Metabolic encephalopathy: resolved, back to mental baseline per HS  3. Rhabdo w/ transaminitis: resolved  4. Hypoalbuminemia:  5. B/L pleural effusion: off O2  6. FINESSE, likely pre-renal: resolved  7. Urinary retention: TOV today  8. HTN: restart losartan  9. SSS w/ PPM  10. Mild dementia  11. DIspo: SYLVIE when medically ready, likely Thursday after PICC
Pt seen and examined by me at bedside earlier in AM with HS. Agree with housestaff's exam/a/p as noted above with additions,   VSS, exam as above with additions,   +S1/S2   +crackle on R base  +bs/nt/nd  +RLE (+3-4)/5weaker than LLE (+4/5)  labs reviewed   imaging reviewed    a/p:  1. Severe sepsis poa  2/2 L12-T1 discitis  2. Staph bacteremia  3. Metabolic encephalopathy 2/2 above, improving  4. Hypokalemia  5. Thrombocytopenia  6. SSS s/p PPM    -MRI C/T/L reviewed, with T12-L1 with slight conus impingement, spoke ortho resident. Ortho attending to review the imaging. Appreciate neuro recs.   -on Cefazolin, pending TTE, hx of PPM, check surveillance cx, ID folllowing,   rest of treatment plan as above.
Pt seen and examined by me with HS earlier in AM. Agree with above with additions,   VS-Temp 102 this afternoon  noted to be slight tachypneic this am,  +crackle on R base,   no wheezing  +distended, +bs/NT  labs reviewed,   AXR-large stool burden    a/p:  1.tachypneic-clinically not overloaded, repeat CXR reviewed, ?2/2 abd distention, peform bedside u/s  2.abd distension 2/2 stool burden, start aggressive bowel regimen  3.severe sepsis poa 2/2 MSSA bacteremia/T12-L1 discitis-appreciate ID recs; neurosx following  4. metabolic encephalopathy-resolving  PPx-on HSQ    rest of treatment plan as above.
Pt seen and examined at bedside. Agree with exam, a/p as above with following addendum/edits:    Over the weekend, blood culture bottle from 4/16 grew out Staph on 4/20. Repeat cultures negative at one day but did spike fever to 101 over weekend. On interview, states he feels lousy and wants to leave the hospital. He can not tell me why he is in the hospital and does appear to have an understanding of his infection. Still refusing SUSANA. On exam, sitting up in bed, no respiratory distress, saturating well on O2 (which he likely doesn't need and will discontinue), decreased breath sounds at the bases, no abd tenderness, extremities WWP.     89 year old M w/     1. Severe sepsis 2/2 MSSA bacteremia: persistent bacteremia, source of discitis and epidural abscess, continue to follow cultures, re-consult ID given persistent bacteremia, if patient does not want additional work up and refusing care will obtain palliative care consult in coordination w/ HCP, will need long term abx regardless, will ultimately need PICC as well but will hold for now given slow growth of last culture, c/w cefazolin and rifampin  2. Metabolic encephalopathy: resolved, back to mental baseline per HS  3. Rhabdo w/ transaminitis: resolved  4. Hypoalbuminemia: nutrition consult  5. B/L pleural effusion: wean O2  6. FINESSE, likely pre-renal: resolved  7. Urinary retention: TOV today  8. HTN  9. SSS w/ PPM  10. Mild dementia  11. DIspo: SYLVIE when medically ready
Pt seen and examined by me at bedside with HS earlier in AM. Agree with above with additions,   pt appear less tachypneic and abd less distended than exam yesterday.   noted fever spike yesterday with bandemia on repeat labs.   VSS,   speaking full sentences  appear slightly tachypneic.   decreas bs on R base, no wheezing  +distended but improved than yesterday, +bs  +dias reinserted failed TOV    labs reviewed    a/p:  1. Persistent staph bacteremia- appreciate ID recs, added rifampin, on cefazolin, has intracardiac device (PPM), pending gallium, will discuss julissa after gallium scan.   2. Tachypneic-? cxr with slight worsening R pleural effusion, will trial low dose lasix, ?septic pulm embolic, f/u gallium scan.   3.  T12/L1 discitis 2/2 MSSA above, neursx following  rest of a/p as above.
no

## 2022-05-12 NOTE — ED ADULT NURSE NOTE - PRIMARY CARE PROVIDER
SW/CM Discharge Plan  Informed patient is ready for discharge. [facility] Patient to be picked up by [facility] at 12:00.  Patient/interested person has been counseled for post hospitalization care.  Patient agrees and understands goals and plan. Important Message From Medicare (IMM)- 2nd copy of pages 1 & 2, explained/reviewed with patient prior to discharge.  Patient/representative verbalized understanding and signature obtained.  Copies placed in file for medical records.  Initial implementation of the patient’s discharge plan has been arranged, including any devices/equipment needed for discharge. Discharge plan communicated to Receiving Facility/Agency ([facility]). Dgtr also updated an in agreement.    [facility].  SW to follow up as needed.    Nicki Reynoso   Cell [phone]  On the weekend, please call [phone].        unknown

## 2022-09-27 NOTE — PROGRESS NOTE ADULT - PROBLEM SELECTOR PROBLEM 6
Problem: MOBILITY - ADULT  Goal: Maintain or return to baseline ADL function  Description: INTERVENTIONS:  -  Assess patient's ability to carry out ADLs; assess patient's baseline for ADL function and identify physical deficits which impact ability to perform ADLs (bathing, care of mouth/teeth, toileting, grooming, dressing, etc )  - Assess/evaluate cause of self-care deficits   - Assess range of motion  - Assess patient's mobility; develop plan if impaired  - Assess patient's need for assistive devices and provide as appropriate  - Encourage maximum independence but intervene and supervise when necessary  - Involve family in performance of ADLs  - Assess for home care needs following discharge   - Consider OT consult to assist with ADL evaluation and planning for discharge  - Provide patient education as appropriate  Outcome: Progressing  Goal: Maintains/Returns to pre admission functional level  Description: INTERVENTIONS:  - Perform BMAT or MOVE assessment daily    - Set and communicate daily mobility goal to care team and patient/family/caregiver  - Collaborate with rehabilitation services on mobility goals if consulted  - Perform Range of Motion 4 times a day  - Reposition patient every 2 hours    - Dangle patient 4 times a day  - Stand patient 4 times a day  - Ambulate patient 4 times a day  - Out of bed to chair 4 times a day   - Out of bed for meals 3 times a day  - Out of bed for toileting  - Record patient progress and toleration of activity level   Outcome: Progressing     Problem: Potential for Falls  Goal: Patient will remain free of falls  Description: INTERVENTIONS:  - Educate patient/family on patient safety including physical limitations  - Instruct patient to call for assistance with activity   - Consult OT/PT to assist with strengthening/mobility   - Keep Call bell within reach  - Keep bed low and locked with side rails adjusted as appropriate  - Keep care items and personal belongings within
reach  - Initiate and maintain comfort rounds  - Make Fall Risk Sign visible to staff  - Offer Toileting every 2 Hours, in advance of need  - Initiate/Maintain bed alarm  - Obtain necessary fall risk management equipment: alarms  - Apply yellow socks and bracelet for high fall risk patients  - Consider moving patient to room near nurses station  Outcome: Progressing     Problem: Prexisting or High Potential for Compromised Skin Integrity  Goal: Skin integrity is maintained or improved  Description: INTERVENTIONS:  - Identify patients at risk for skin breakdown  - Assess and monitor skin integrity  - Assess and monitor nutrition and hydration status  - Monitor labs   - Assess for incontinence   - Turn and reposition patient  - Assist with mobility/ambulation  - Relieve pressure over bony prominences  - Avoid friction and shearing  - Provide appropriate hygiene as needed including keeping skin clean and dry  - Evaluate need for skin moisturizer/barrier cream  - Collaborate with interdisciplinary team   - Patient/family teaching  - Consider wound care consult   Outcome: Progressing     Problem: INFECTION - ADULT  Goal: Absence or prevention of progression during hospitalization  Description: INTERVENTIONS:  - Assess and monitor for signs and symptoms of infection  - Monitor lab/diagnostic results  - Monitor all insertion sites, i e  indwelling lines, tubes, and drains  - Monitor endotracheal if appropriate and nasal secretions for changes in amount and color  - [place] appropriate cooling/warming therapies per order  - Administer medications as ordered  - Instruct and encourage patient and family to use good hand hygiene technique  - Identify and instruct in appropriate isolation precautions for identified infection/condition  Outcome: Progressing
Discitis
Discitis
Essential hypertension
Discitis
Essential hypertension

## 2023-12-04 NOTE — ED PROVIDER NOTE - NS ED MD DISPO ADMIT [facility] PALLIATIVE CARE
Received request via: Patient    Was the patient seen in the last year in this department? Yes    Does the patient have an active prescription (recently filled or refills available) for medication(s) requested? No    Does the patient have [facility] and need 100 day supply (blood pressure, diabetes and cholesterol meds only)? Yes, quantity updated to 100 days   NONE

## 2024-05-09 NOTE — DIETITIAN INITIAL EVALUATION ADULT. - PT NOT SOURCE
PROGRESS NOTE     Subjective     Leopoldo is a 33 year old here for Physical, Lipids (/), and Medication Management   New baby girl 3 weeks, Dylan.   Boy 21 months.   Tired but things are going well.   Long term- stay on simvastatin?    Review of Systems  As documented above.    SDOH Never Smoker       Objective   Vitals:    05/09/24 1258   BP: 132/72   Pulse: 72   Resp: 16   Temp: 97.9 °F (36.6 °C)   TempSrc: Tympanic   SpO2: 99%   Weight: 87.9 kg (193 lb 12.8 oz)   Height: 5' 10\" (1.778 m)     Physical Exam  General: Alert. Normal appearance.  Head: Normocephalic.  Eyes: Pupils equal, round and reactive to light. Conjunctivae normal.  Ear: Tympanic membranes and external ear canals normal.  Nose: Nares normal. No sinus tenderness.  Throat: Lips, mucosa, and tongue normal. Pharyngeal mucosa non-inflamed. Teeth normal.  Neck: Supple.Thyroid normal. Lymphadenopathy is not present.  Cardiovascular: Normal rate and regular rhythm. Normal S1, S2. Murmurs not present.  Respiratory: Normal respiratory effort. No wheezing, rales or rhonchi.  Abdomen: Soft, non-tender and bowel sounds active. No masses or organomegaly.  Musculoskeletal: Upper and lower extremity strength and range of motion is normal. Lower extremity edema not present. Gait is normal.  Neurologic: Cranial nerves 2-12 grossly intact. No focal deficits.  Psychiatric: Mood is normal, affect is normal, thought content is normal.         ASSESSMENT AND PLAN        1. Well adult exam  -     Basic Metabolic Panel  -     CBC No Differential  -     Glycohemoglobin  -     Lipid Panel Without Reflex  -     Thyroid Stimulating Hormone Reflex  -     Vitamin D -25 Hydroxy  2. Hyperlipidemia, mixed  -     Lipid Panel Without Reflex  3. Elevated hemoglobin A1c  -     Glycohemoglobin  Other orders  -     simvastatin (ZOCOR) 20 MG tablet; Take 1 tablet by mouth nightly.      Follow Up           AMS, Alzheimer- Dementia/other (specify)/confused

## 2024-05-24 NOTE — DISCHARGE NOTE ADULT - ADMISSION DATE +STARTOFVISITDATE
Statement Selected
[email or web address]

## 2024-06-24 NOTE — DISCHARGE NOTE PROVIDER - NSDCQMPCI_CARD_ALL_CORE
[place]      She presents for her annual.    GYN: She had endometrial ablation.  She is in menopause she feels mostly hot flushes flashes but it is controllable.  She does not want to use any estrogen.  Denies any vaginal spotting bleeding however she does get occasional yeast infections and noted more vaginal dryness.  Couple years ago we tried some vaginal prescription estrogen.  She never picked it up due to the cost.  She did not investigate options as to what might be covered.  At this time we will try generic Premarin vaginal cream again to help with her vaginal dryness in addition I will send fluconazole to have on hand for those occasional yeast vaginitis secondary to higher blood sugars.  We reviewed screening guidelines for Pap smears.  She is not due for Pap today.  We also reviewed ovarian cancer signs symptoms    Diabetes: She admits she is only checking her blood sugars maybe 2-3 times during the week.  They are running anywhere between 110 and 130.  She is compliant taking all of her medications.  She does have issues at times getting her injectable medication due to availability however, she is working hard on her diet and exercise.  Minimal alcohol.  She has an appointment scheduled in July for her eye exam.  She denies any open sores or cuts.  She is tries to stay up-to-date on her exercise routine sleep and watching her diet.  She understands she needs to stay on her statin medication    She did see the endocrinologist on her thyroid nodules.  At this time no further testing or needle aspiration we will recheck thyroid levels    History of vitamin D deficiency.  She is compliant taking over-the-counter vitamin D will check levels    She is continued over the years to have vague generalized complaints use of muscle body aches, occasional swelling in her fingers and hips area.  I had referred her to a rheumatologist and neurologist but did not follow through.  Since she has been very  compliant and better control of her diabetes some of those symptoms have quieted down a bit however she notices occasional episodes.  Today she also brings to my attention of rash that is red on her face butterfly type appearance.  She does not drink any alcohol.  Discussed this could be associated with a autoimmune type disorder.  Although we have checked rheumatoid arthritis and SOPHIE in the past she would like to get checked again.  She will think about seeing a rheumatologist.  Otherwise we will give her some cream to see if that helps.  She has a lesion on her back she would like to see a dermatologist and have that evaluated.    Health maintenance.  She did not want to have a colonoscopy but she did do the Cologuard this was about 2 years ago.  Reviewed the role of screening colonoscopies and she will continue to think about this option.    Mental health: States she feels quite good she is not sad or depressed or anxious.  Tries to get good sleep and exercise.  Has some more travel plans scheduled with her  who is also lost quite a bit of weight    GYN HX:   OB History    Para Term  AB Living   1 0 0 0 0 0   SAB IAB Ectopic Molar Multiple Live Births   0 0 0 0 0 1   Obstetric Comments   Menses onset age 12, endometrial ablation age 42, no additional menses. Hx of ovarian right removal. No hx of abnormal paps, hx of STI'S       History reviewed. No pertinent past medical history.  Past Surgical History:   Procedure Laterality Date    Cholecystectomy      Endometrial ablation      age 42    Ovarian cyst removal      Tonsillectomy       Current Outpatient Medications   Medication Sig Dispense Refill    estrogens conjugated (PREMARIN) vaginal cream Insert 0.5 g nightly for 10 nights, then twice/week vaginal 30 g 3    metroNIDAZOLE (METROCREAM) 0.75 % cream Apply topically 2 times daily. 45 g 1    dapagliflozin (Farxiga) 5 MG tablet Take 1 tablet by mouth daily. 90 tablet 0    metFORMIN  (GLUCOPHAGE-XR) 500 MG 24 hr tablet Take 2 tablets by mouth daily (with breakfast). 180 tablet 3    tirzepatide (MOUNJARO) 5 MG/0.5ML Solution Pen-injector Inject 5 mg into the skin every 7 days. Indications: Type 2 Diabetes 2 mL 3    blood glucose (OneTouch Ultra) test strip Use to test blood sugar 3 times daily. 300 strip 1    rosuvastatin (Crestor) 20 MG tablet Take 1 tablet by mouth daily. 90 tablet 3    Blood Glucose Monitoring Suppl w/Device Kit Use to check blood sugar strips 3 times/day 1 kit 0     No current facility-administered medications for this visit.       Family History   Adopted: Yes   Problem Relation Age of Onset    Seizure Disorder Mother     Motor Vehicle Accident Mother     Motor Vehicle Accident Father     Anxiety disorder Brother     Cancer Maternal Uncle 70        colon cancer    Stroke Maternal Grandmother        Family hx of colon/breast or ovarian cancer:    Social History     Tobacco Use    Smoking status: Former     Current packs/day: 0.00     Types: Cigarettes     Start date:      Quit date:      Years since quittin.4    Smokeless tobacco: Never   Substance Use Topics    Alcohol use: Yes     Comment: 1 drink per month    Drug use: Never             Visit Vitals  /74 (BP Location: RUE - Right upper extremity, Patient Position: Sitting, Cuff Size: Regular)   Pulse 74   Temp 98 °F (36.7 °C)   Resp 16   Ht 5' 5\" (1.651 m)   Wt 90.2 kg (198 lb 14.4 oz)   SpO2 98%   BMI 33.10 kg/m²         ALLERGIES:  No Known Allergies  Review of Systems   Constitutional:  Negative for fever, chills, fatigue and unexpected weight change.   HENT:  Negative for congestion, rhinorrhea, sneezing, mouth sores and postnasal drip.   Eyes:  Negative for visual disturbance.   Respiratory:  Negative for cough, shortness of breath and wheezing.   Cardiovascular:  Negative for chest pain and leg swelling.   Gastrointestinal:  Negative for nausea, vomiting, abdominal pain, diarrhea, constipation,  No blood in stool and abdominal distention.   Genitourinary:  Negative for dysuria, urgency, frequency, hematuria, decreased urine volume, vaginal bleeding, vaginal discharge, difficulty urinating, vaginal pain, pelvic pain and dyspareunia.   Musculoskeletal:  Negative for myalgias and arthralgias.   Skin:  Negative for color change and rash.   Neurological:  Negative for weakness, lightheadedness, numbness and headaches.   Hematological:  Negative for adenopathy.  Does not bruise/bleed easily.   Psychiatric/Behavioral:  Negative for sleep disturbance and dysphoric mood.  The patient is not nervous/anxious.     OBJECTIVE  General:  Well developed, well nourished female in no acute distress.  Her mood and affect are appropriate.   Skin:  Warm and moist without erythema or new irregular lesions.  Has noted slightly raised red rash butterflies appearance on her face from her nose to bilateral upper cheeks  HEENT:  No thyromegaly.  Head:  Normocephalic and atraumatic.   Eyes:  EOMs are normal.  Pupils are equal, round, and reactive to light.  Nose/throat:  Nose patent, pharyngeal wall clear, no oral lesions.   Neck:  Normal range of motion.  Neck supple.  Without lymphadenopathy.  Breasts:  No masses, no nipple discharge, no lymphadenopathy, free of axillary nodes.  Cardiovascular:  Normal rate, regular rhythm and normal heart sounds.  No murmur.   Pulmonary/Chest:  Effort normal and breath sounds normal.  Clear anterior posterior.  No adventitious sounds.  No wheeze or rales.  Abdominal:  Soft.  Bowel sounds are normal.  She exhibits no distension.  There is no tenderness.  No hepatosplenomegaly, no hernias.  No CVT.  Musculoskeletal:  Normal range of motion of shoulders, arms, hips, knees.  No swollen joint or muscle pain.   Neurological:  CNII-XII intact, normal strength, sensation.   Skin:  Skin is warm and dry.   Psychiatric:  She has a normal mood and affect.  Her behavior is normal.  Judgment and thought content  normal.              ASSESSMENT  ANNUAL  Rosacea  Non-insulin-dependent diabetic doing well  Atrophic vaginitis              PLAN  Diagnostic ending fasting lab work  Therapeutic statin all of her present medications will send MetroGel for her face fluconazole as needed for vaginal yeast infection and will try the Estrace vaginal cream    Therapeutic added fluconazole and Estrace vaginal cream and MetroGel    Referral discussed options for rheumatologist    Education: Reviewed screening guidelines follow-up in 6 months will communicate online results  DEPRESSION ASSESSMENT/PLAN:  Depression screening is negative no further plan needed.     Review Flowsheet  More data exists         6/24/2024   PHQ 2/9 Score   Adult PHQ 2 Score 0   Adult PHQ 2 Interpretation No further screening needed   Little interest or pleasure in activity? Not at all   Feeling down, depressed or hopeless? Not at all      Details

## 2024-09-05 NOTE — H&P ADULT - PROBLEM/PLAN-9
no lesions,  no deformities,  chest wall non-tender, breathing is unlabored without accessory muscle use, lungs clear to auscultation bilaterally DISPLAY PLAN FREE TEXT

## 2024-11-06 NOTE — PROGRESS NOTE ADULT - PROBLEM SELECTOR PLAN 1
Pulmonology consulted for tachypnea with increased oxygen requirements from baseline. Vitals and I/Os reviewed. Patient intermittently tachypneic as high as 30 respirations per minute, and has been on 2L NC since admission, now titrated up to 3L  - Etiology likely multifactorial. CXR and Abdominal CT showing evidence of b/l Pleural effusions with R sided consolidation/atelectasis leading to formation of dead space and worsening VQ mismatch. Patient also with back pain at thoracolumbar junction and abdominal distention leading to splinting and shallow breaths decreasing tidal volume.  - POCT US this morning showing b/l small pleural effusions    Recommend:  - Incentive Spirometry use 10x/hour while awake, this should be strongly enforced.   - Pt needs to have PT which he will strongly benefit from, he needs to ambulate as much as possible, and he needs to sit in the chair as much as possible.   - Optimized pain control and bowel regimen. Left/Radial/Artery Pulmonology consulted for tachypnea with increased oxygen requirements from baseline. Vitals and I/Os reviewed.   -Patient visibly less tachypneic today.  - Etiology likely multifactorial including small b/l Pleural effusions b/l atelectasis leading to formation of dead space and worsening VQ mismatch as well as splinting from pain.  - POCUS this morning showing small simple left effusion, slightly smaller than yesterday, significant atelectasis on the left but present as well on the right. A-Line predominant pattern otherwise.     Recommend:  - Incentive Spirometry use 10x/hour while awake, this should be strongly enforced.   - Pt needs to have PT which he will strongly benefit from, he needs to ambulate as much as possible, and he needs to sit in the chair as much as possible.   - Optimized pain control and bowel regimen.  -WBC count increased today in the setting of slightly improved respiratory status, this more likely linked to the patients abscess.
